# Patient Record
Sex: FEMALE | Race: WHITE | NOT HISPANIC OR LATINO | Employment: OTHER | URBAN - METROPOLITAN AREA
[De-identification: names, ages, dates, MRNs, and addresses within clinical notes are randomized per-mention and may not be internally consistent; named-entity substitution may affect disease eponyms.]

---

## 2017-02-10 ENCOUNTER — APPOINTMENT (OUTPATIENT)
Dept: LAB | Facility: CLINIC | Age: 72
End: 2017-02-10
Payer: COMMERCIAL

## 2017-02-10 ENCOUNTER — TRANSCRIBE ORDERS (OUTPATIENT)
Dept: LAB | Facility: CLINIC | Age: 72
End: 2017-02-10

## 2017-02-10 DIAGNOSIS — E13.9 DIABETES MELLITUS OF OTHER TYPE WITHOUT COMPLICATION: Primary | ICD-10-CM

## 2017-02-10 DIAGNOSIS — E55.9 UNSPECIFIED VITAMIN D DEFICIENCY: ICD-10-CM

## 2017-02-10 DIAGNOSIS — M81.0 OSTEOPOROSIS, UNSPECIFIED: ICD-10-CM

## 2017-02-10 LAB
25(OH)D3 SERPL-MCNC: 33 NG/ML (ref 30–100)
ALBUMIN SERPL BCP-MCNC: 3.4 G/DL (ref 3.5–5)
ALP SERPL-CCNC: 53 U/L (ref 46–116)
ALT SERPL W P-5'-P-CCNC: 18 U/L (ref 12–78)
ANION GAP SERPL CALCULATED.3IONS-SCNC: 8 MMOL/L (ref 4–13)
AST SERPL W P-5'-P-CCNC: 13 U/L (ref 5–45)
BILIRUB SERPL-MCNC: 0.65 MG/DL (ref 0.2–1)
BUN SERPL-MCNC: 17 MG/DL (ref 5–25)
CALCIUM SERPL-MCNC: 8.9 MG/DL (ref 8.3–10.1)
CHLORIDE SERPL-SCNC: 106 MMOL/L (ref 100–108)
CHOLEST SERPL-MCNC: 114 MG/DL (ref 50–200)
CO2 SERPL-SCNC: 27 MMOL/L (ref 21–32)
CREAT SERPL-MCNC: 1.12 MG/DL (ref 0.6–1.3)
EST. AVERAGE GLUCOSE BLD GHB EST-MCNC: 131 MG/DL
GFR SERPL CREATININE-BSD FRML MDRD: 47.6 ML/MIN/1.73SQ M
GLUCOSE SERPL-MCNC: 124 MG/DL (ref 65–140)
HBA1C MFR BLD: 6.2 % (ref 4.2–6.3)
HDLC SERPL-MCNC: 58 MG/DL (ref 40–60)
LDLC SERPL CALC-MCNC: 31 MG/DL (ref 0–100)
POTASSIUM SERPL-SCNC: 4 MMOL/L (ref 3.5–5.3)
PROT SERPL-MCNC: 7 G/DL (ref 6.4–8.2)
SODIUM SERPL-SCNC: 141 MMOL/L (ref 136–145)
TRIGL SERPL-MCNC: 127 MG/DL
TSH SERPL DL<=0.05 MIU/L-ACNC: 0.67 UIU/ML (ref 0.36–3.74)

## 2017-02-10 PROCEDURE — 84443 ASSAY THYROID STIM HORMONE: CPT

## 2017-02-10 PROCEDURE — 82306 VITAMIN D 25 HYDROXY: CPT

## 2017-02-10 PROCEDURE — 36415 COLL VENOUS BLD VENIPUNCTURE: CPT

## 2017-02-10 PROCEDURE — 83036 HEMOGLOBIN GLYCOSYLATED A1C: CPT

## 2017-02-10 PROCEDURE — 80061 LIPID PANEL: CPT

## 2017-02-10 PROCEDURE — 80053 COMPREHEN METABOLIC PANEL: CPT

## 2017-06-06 ENCOUNTER — APPOINTMENT (OUTPATIENT)
Dept: LAB | Facility: CLINIC | Age: 72
End: 2017-06-06
Payer: COMMERCIAL

## 2017-06-06 ENCOUNTER — TRANSCRIBE ORDERS (OUTPATIENT)
Dept: LAB | Facility: CLINIC | Age: 72
End: 2017-06-06

## 2017-06-06 DIAGNOSIS — I10 ESSENTIAL HYPERTENSION, MALIGNANT: ICD-10-CM

## 2017-06-06 DIAGNOSIS — E55.9 UNSPECIFIED VITAMIN D DEFICIENCY: ICD-10-CM

## 2017-06-06 DIAGNOSIS — E78.5 OTHER AND UNSPECIFIED HYPERLIPIDEMIA: ICD-10-CM

## 2017-06-06 DIAGNOSIS — N18.30 CHRONIC KIDNEY DISEASE, STAGE III (MODERATE) (HCC): Primary | ICD-10-CM

## 2017-06-06 LAB
ALBUMIN SERPL BCP-MCNC: 3.4 G/DL (ref 3.5–5)
ALP SERPL-CCNC: 62 U/L (ref 46–116)
ALT SERPL W P-5'-P-CCNC: 16 U/L (ref 12–78)
ANION GAP SERPL CALCULATED.3IONS-SCNC: 8 MMOL/L (ref 4–13)
AST SERPL W P-5'-P-CCNC: 15 U/L (ref 5–45)
BASOPHILS # BLD AUTO: 0.04 THOUSANDS/ΜL (ref 0–0.1)
BASOPHILS NFR BLD AUTO: 0 % (ref 0–1)
BILIRUB SERPL-MCNC: 0.57 MG/DL (ref 0.2–1)
BUN SERPL-MCNC: 13 MG/DL (ref 5–25)
CALCIUM SERPL-MCNC: 9.2 MG/DL (ref 8.3–10.1)
CHLORIDE SERPL-SCNC: 106 MMOL/L (ref 100–108)
CHOLEST SERPL-MCNC: 124 MG/DL (ref 50–200)
CO2 SERPL-SCNC: 27 MMOL/L (ref 21–32)
CREAT SERPL-MCNC: 1.16 MG/DL (ref 0.6–1.3)
CREAT UR-MCNC: 275 MG/DL
EOSINOPHIL # BLD AUTO: 0.13 THOUSAND/ΜL (ref 0–0.61)
EOSINOPHIL NFR BLD AUTO: 2 % (ref 0–6)
ERYTHROCYTE [DISTWIDTH] IN BLOOD BY AUTOMATED COUNT: 13.7 % (ref 11.6–15.1)
EST. AVERAGE GLUCOSE BLD GHB EST-MCNC: 131 MG/DL
GFR SERPL CREATININE-BSD FRML MDRD: 45.7 ML/MIN/1.73SQ M
GLUCOSE P FAST SERPL-MCNC: 119 MG/DL (ref 65–99)
HBA1C MFR BLD: 6.2 % (ref 4.2–6.3)
HCT VFR BLD AUTO: 43 % (ref 34.8–46.1)
HDLC SERPL-MCNC: 54 MG/DL (ref 40–60)
HGB BLD-MCNC: 14 G/DL (ref 11.5–15.4)
LDLC SERPL CALC-MCNC: 45 MG/DL (ref 0–100)
LYMPHOCYTES # BLD AUTO: 2.79 THOUSANDS/ΜL (ref 0.6–4.47)
LYMPHOCYTES NFR BLD AUTO: 31 % (ref 14–44)
MCH RBC QN AUTO: 30.2 PG (ref 26.8–34.3)
MCHC RBC AUTO-ENTMCNC: 32.6 G/DL (ref 31.4–37.4)
MCV RBC AUTO: 93 FL (ref 82–98)
MICROALBUMIN UR-MCNC: 46.7 MG/L (ref 0–20)
MICROALBUMIN/CREAT 24H UR: 17 MG/G CREATININE (ref 0–30)
MONOCYTES # BLD AUTO: 0.86 THOUSAND/ΜL (ref 0.17–1.22)
MONOCYTES NFR BLD AUTO: 10 % (ref 4–12)
NEUTROPHILS # BLD AUTO: 5.09 THOUSANDS/ΜL (ref 1.85–7.62)
NEUTS SEG NFR BLD AUTO: 57 % (ref 43–75)
NRBC BLD AUTO-RTO: 0 /100 WBCS
PHOSPHATE SERPL-MCNC: 3 MG/DL (ref 2.3–4.1)
PLATELET # BLD AUTO: 260 THOUSANDS/UL (ref 149–390)
PMV BLD AUTO: 10.8 FL (ref 8.9–12.7)
POTASSIUM SERPL-SCNC: 4.1 MMOL/L (ref 3.5–5.3)
PROT SERPL-MCNC: 7.2 G/DL (ref 6.4–8.2)
PTH-INTACT SERPL-MCNC: 123.1 PG/ML (ref 14–72)
RBC # BLD AUTO: 4.63 MILLION/UL (ref 3.81–5.12)
SODIUM SERPL-SCNC: 141 MMOL/L (ref 136–145)
TRIGL SERPL-MCNC: 127 MG/DL
WBC # BLD AUTO: 8.94 THOUSAND/UL (ref 4.31–10.16)

## 2017-06-06 PROCEDURE — 83036 HEMOGLOBIN GLYCOSYLATED A1C: CPT

## 2017-06-06 PROCEDURE — 36415 COLL VENOUS BLD VENIPUNCTURE: CPT

## 2017-06-06 PROCEDURE — 85025 COMPLETE CBC W/AUTO DIFF WBC: CPT

## 2017-06-06 PROCEDURE — 80061 LIPID PANEL: CPT

## 2017-06-06 PROCEDURE — 84100 ASSAY OF PHOSPHORUS: CPT

## 2017-06-06 PROCEDURE — 80053 COMPREHEN METABOLIC PANEL: CPT

## 2017-06-06 PROCEDURE — 83970 ASSAY OF PARATHORMONE: CPT

## 2017-06-06 PROCEDURE — 82570 ASSAY OF URINE CREATININE: CPT

## 2017-06-06 PROCEDURE — 82043 UR ALBUMIN QUANTITATIVE: CPT

## 2017-06-16 ENCOUNTER — APPOINTMENT (OUTPATIENT)
Dept: LAB | Facility: CLINIC | Age: 72
End: 2017-06-16
Payer: COMMERCIAL

## 2017-06-16 ENCOUNTER — TRANSCRIBE ORDERS (OUTPATIENT)
Dept: LAB | Facility: CLINIC | Age: 72
End: 2017-06-16

## 2017-06-16 DIAGNOSIS — M81.0 SENILE OSTEOPOROSIS: Primary | ICD-10-CM

## 2017-06-16 LAB
25(OH)D3 SERPL-MCNC: 34 NG/ML (ref 30–100)
ANION GAP SERPL CALCULATED.3IONS-SCNC: 10 MMOL/L (ref 4–13)
BUN SERPL-MCNC: 14 MG/DL (ref 5–25)
CA-I BLD-SCNC: 1.31 MMOL/L (ref 1.12–1.32)
CALCIUM SERPL-MCNC: 9.8 MG/DL (ref 8.3–10.1)
CHLORIDE SERPL-SCNC: 108 MMOL/L (ref 100–108)
CO2 SERPL-SCNC: 26 MMOL/L (ref 21–32)
CREAT SERPL-MCNC: 1.2 MG/DL (ref 0.6–1.3)
GFR SERPL CREATININE-BSD FRML MDRD: 43.9 ML/MIN/1.73SQ M
GLUCOSE SERPL-MCNC: 104 MG/DL (ref 65–140)
POTASSIUM SERPL-SCNC: 4.5 MMOL/L (ref 3.5–5.3)
SODIUM SERPL-SCNC: 144 MMOL/L (ref 136–145)

## 2017-06-16 PROCEDURE — 36415 COLL VENOUS BLD VENIPUNCTURE: CPT

## 2017-06-16 PROCEDURE — 82330 ASSAY OF CALCIUM: CPT

## 2017-06-16 PROCEDURE — 80048 BASIC METABOLIC PNL TOTAL CA: CPT

## 2017-06-16 PROCEDURE — 82306 VITAMIN D 25 HYDROXY: CPT

## 2018-01-19 ENCOUNTER — APPOINTMENT (OUTPATIENT)
Dept: LAB | Facility: CLINIC | Age: 73
End: 2018-01-19
Payer: COMMERCIAL

## 2018-01-19 ENCOUNTER — TRANSCRIBE ORDERS (OUTPATIENT)
Dept: LAB | Facility: CLINIC | Age: 73
End: 2018-01-19

## 2018-01-19 DIAGNOSIS — M81.0 SENILE OSTEOPOROSIS: ICD-10-CM

## 2018-01-19 DIAGNOSIS — E78.5 HYPERLIPIDEMIA, UNSPECIFIED HYPERLIPIDEMIA TYPE: ICD-10-CM

## 2018-01-19 DIAGNOSIS — N18.30 CHRONIC KIDNEY DISEASE, STAGE III (MODERATE) (HCC): ICD-10-CM

## 2018-01-19 DIAGNOSIS — I10 ESSENTIAL HYPERTENSION, MALIGNANT: ICD-10-CM

## 2018-01-19 DIAGNOSIS — E11.8 TYPE 2 DIABETES MELLITUS WITH COMPLICATION, UNSPECIFIED LONG TERM INSULIN USE STATUS: ICD-10-CM

## 2018-01-19 DIAGNOSIS — E55.9 AVITAMINOSIS D: ICD-10-CM

## 2018-01-19 DIAGNOSIS — E11.8 TYPE 2 DIABETES MELLITUS WITH COMPLICATION, UNSPECIFIED LONG TERM INSULIN USE STATUS: Primary | ICD-10-CM

## 2018-01-19 LAB
ALBUMIN SERPL BCP-MCNC: 3.4 G/DL (ref 3.5–5)
ALP SERPL-CCNC: 66 U/L (ref 46–116)
ALT SERPL W P-5'-P-CCNC: 20 U/L (ref 12–78)
ANION GAP SERPL CALCULATED.3IONS-SCNC: 6 MMOL/L (ref 4–13)
AST SERPL W P-5'-P-CCNC: 15 U/L (ref 5–45)
BILIRUB SERPL-MCNC: 0.75 MG/DL (ref 0.2–1)
BUN SERPL-MCNC: 22 MG/DL (ref 5–25)
CALCIUM SERPL-MCNC: 9.3 MG/DL (ref 8.3–10.1)
CHLORIDE SERPL-SCNC: 104 MMOL/L (ref 100–108)
CHOLEST SERPL-MCNC: 123 MG/DL (ref 50–200)
CO2 SERPL-SCNC: 26 MMOL/L (ref 21–32)
CREAT SERPL-MCNC: 1.56 MG/DL (ref 0.6–1.3)
CREAT UR-MCNC: 301 MG/DL
ERYTHROCYTE [DISTWIDTH] IN BLOOD BY AUTOMATED COUNT: 13.9 % (ref 11.6–15.1)
EST. AVERAGE GLUCOSE BLD GHB EST-MCNC: 169 MG/DL
GFR SERPL CREATININE-BSD FRML MDRD: 32 ML/MIN/1.73SQ M
GLUCOSE P FAST SERPL-MCNC: 182 MG/DL (ref 65–99)
HBA1C MFR BLD: 7.5 % (ref 4.2–6.3)
HCT VFR BLD AUTO: 44.4 % (ref 34.8–46.1)
HDLC SERPL-MCNC: 54 MG/DL (ref 40–60)
HGB BLD-MCNC: 14.6 G/DL (ref 11.5–15.4)
LDLC SERPL CALC-MCNC: 42 MG/DL (ref 0–100)
MCH RBC QN AUTO: 30.1 PG (ref 26.8–34.3)
MCHC RBC AUTO-ENTMCNC: 32.9 G/DL (ref 31.4–37.4)
MCV RBC AUTO: 92 FL (ref 82–98)
MICROALBUMIN UR-MCNC: 41.3 MG/L (ref 0–20)
MICROALBUMIN/CREAT 24H UR: 14 MG/G CREATININE (ref 0–30)
PHOSPHATE SERPL-MCNC: 3.6 MG/DL (ref 2.3–4.1)
PLATELET # BLD AUTO: 229 THOUSANDS/UL (ref 149–390)
PMV BLD AUTO: 9.9 FL (ref 8.9–12.7)
POTASSIUM SERPL-SCNC: 3.7 MMOL/L (ref 3.5–5.3)
PROT SERPL-MCNC: 7.2 G/DL (ref 6.4–8.2)
PTH-INTACT SERPL-MCNC: 106.8 PG/ML (ref 14–72)
RBC # BLD AUTO: 4.85 MILLION/UL (ref 3.81–5.12)
SODIUM SERPL-SCNC: 136 MMOL/L (ref 136–145)
TRIGL SERPL-MCNC: 135 MG/DL
WBC # BLD AUTO: 8.42 THOUSAND/UL (ref 4.31–10.16)

## 2018-01-19 PROCEDURE — 36415 COLL VENOUS BLD VENIPUNCTURE: CPT

## 2018-01-19 PROCEDURE — 83036 HEMOGLOBIN GLYCOSYLATED A1C: CPT

## 2018-01-19 PROCEDURE — 84100 ASSAY OF PHOSPHORUS: CPT

## 2018-01-19 PROCEDURE — 82043 UR ALBUMIN QUANTITATIVE: CPT

## 2018-01-19 PROCEDURE — 85027 COMPLETE CBC AUTOMATED: CPT

## 2018-01-19 PROCEDURE — 82570 ASSAY OF URINE CREATININE: CPT

## 2018-01-19 PROCEDURE — 80053 COMPREHEN METABOLIC PANEL: CPT

## 2018-01-19 PROCEDURE — 83970 ASSAY OF PARATHORMONE: CPT

## 2018-01-19 PROCEDURE — 80061 LIPID PANEL: CPT

## 2018-02-01 ENCOUNTER — APPOINTMENT (OUTPATIENT)
Dept: LAB | Facility: CLINIC | Age: 73
End: 2018-02-01
Payer: COMMERCIAL

## 2018-02-01 ENCOUNTER — TRANSCRIBE ORDERS (OUTPATIENT)
Dept: LAB | Facility: CLINIC | Age: 73
End: 2018-02-01

## 2018-02-01 DIAGNOSIS — E04.2 NONTOXIC MULTINODULAR GOITER: ICD-10-CM

## 2018-02-01 DIAGNOSIS — E11.9 TYPE 2 DIABETES MELLITUS WITHOUT COMPLICATION, UNSPECIFIED LONG TERM INSULIN USE STATUS: ICD-10-CM

## 2018-02-01 DIAGNOSIS — N18.30 CHRONIC KIDNEY DISEASE, STAGE III (MODERATE) (HCC): ICD-10-CM

## 2018-02-01 DIAGNOSIS — E04.2 NONTOXIC MULTINODULAR GOITER: Primary | ICD-10-CM

## 2018-02-01 LAB — TSH SERPL DL<=0.05 MIU/L-ACNC: 0.54 UIU/ML (ref 0.36–3.74)

## 2018-02-01 PROCEDURE — 84443 ASSAY THYROID STIM HORMONE: CPT

## 2018-02-01 PROCEDURE — 36415 COLL VENOUS BLD VENIPUNCTURE: CPT

## 2018-05-21 ENCOUNTER — TRANSCRIBE ORDERS (OUTPATIENT)
Dept: LAB | Facility: CLINIC | Age: 73
End: 2018-05-21

## 2018-05-21 ENCOUNTER — APPOINTMENT (OUTPATIENT)
Dept: LAB | Facility: CLINIC | Age: 73
End: 2018-05-21
Payer: COMMERCIAL

## 2018-05-21 DIAGNOSIS — N18.6 TYPE 2 DIABETES MELLITUS WITH ESRD (END-STAGE RENAL DISEASE) (HCC): Primary | ICD-10-CM

## 2018-05-21 DIAGNOSIS — N18.9 CHRONIC KIDNEY DISEASE, UNSPECIFIED CKD STAGE: ICD-10-CM

## 2018-05-21 DIAGNOSIS — I12.9 PARENCHYMAL RENAL HYPERTENSION, STAGE 1 THROUGH STAGE 4 OR UNSPECIFIED CHRONIC KIDNEY DISEASE: ICD-10-CM

## 2018-05-21 DIAGNOSIS — N18.6 TYPE 2 DIABETES MELLITUS WITH ESRD (END-STAGE RENAL DISEASE) (HCC): ICD-10-CM

## 2018-05-21 DIAGNOSIS — E11.22 TYPE 2 DIABETES MELLITUS WITH ESRD (END-STAGE RENAL DISEASE) (HCC): ICD-10-CM

## 2018-05-21 DIAGNOSIS — E11.22 TYPE 2 DIABETES MELLITUS WITH ESRD (END-STAGE RENAL DISEASE) (HCC): Primary | ICD-10-CM

## 2018-05-21 LAB
ANION GAP SERPL CALCULATED.3IONS-SCNC: 7 MMOL/L (ref 4–13)
BUN SERPL-MCNC: 24 MG/DL (ref 5–25)
CALCIUM SERPL-MCNC: 8.8 MG/DL (ref 8.3–10.1)
CHLORIDE SERPL-SCNC: 107 MMOL/L (ref 100–108)
CO2 SERPL-SCNC: 28 MMOL/L (ref 21–32)
CREAT SERPL-MCNC: 1.52 MG/DL (ref 0.6–1.3)
EST. AVERAGE GLUCOSE BLD GHB EST-MCNC: 200 MG/DL
GFR SERPL CREATININE-BSD FRML MDRD: 33 ML/MIN/1.73SQ M
GLUCOSE P FAST SERPL-MCNC: 229 MG/DL (ref 65–99)
HBA1C MFR BLD: 8.6 % (ref 4.2–6.3)
POTASSIUM SERPL-SCNC: 4.3 MMOL/L (ref 3.5–5.3)
SODIUM SERPL-SCNC: 142 MMOL/L (ref 136–145)

## 2018-05-21 PROCEDURE — 36415 COLL VENOUS BLD VENIPUNCTURE: CPT

## 2018-05-21 PROCEDURE — 83036 HEMOGLOBIN GLYCOSYLATED A1C: CPT

## 2018-05-21 PROCEDURE — 80048 BASIC METABOLIC PNL TOTAL CA: CPT

## 2018-09-24 ENCOUNTER — APPOINTMENT (OUTPATIENT)
Dept: LAB | Facility: CLINIC | Age: 73
End: 2018-09-24
Payer: COMMERCIAL

## 2018-09-24 ENCOUNTER — TRANSCRIBE ORDERS (OUTPATIENT)
Dept: LAB | Facility: CLINIC | Age: 73
End: 2018-09-24

## 2018-09-24 DIAGNOSIS — N18.6 TYPE 2 DIABETES MELLITUS WITH ESRD (END-STAGE RENAL DISEASE) (HCC): ICD-10-CM

## 2018-09-24 DIAGNOSIS — I12.9 PARENCHYMAL RENAL HYPERTENSION, STAGE 1 THROUGH STAGE 4 OR UNSPECIFIED CHRONIC KIDNEY DISEASE: ICD-10-CM

## 2018-09-24 DIAGNOSIS — E11.49 DIABETIC NEUROPATHY WITH NEUROLOGIC COMPLICATION (HCC): Primary | ICD-10-CM

## 2018-09-24 DIAGNOSIS — E11.22 TYPE 2 DIABETES MELLITUS WITH ESRD (END-STAGE RENAL DISEASE) (HCC): ICD-10-CM

## 2018-09-24 DIAGNOSIS — E11.40 DIABETIC NEUROPATHY WITH NEUROLOGIC COMPLICATION (HCC): Primary | ICD-10-CM

## 2018-09-24 DIAGNOSIS — N18.30 CHRONIC KIDNEY DISEASE, STAGE III (MODERATE) (HCC): ICD-10-CM

## 2018-09-24 DIAGNOSIS — E11.49 DIABETIC NEUROPATHY WITH NEUROLOGIC COMPLICATION (HCC): ICD-10-CM

## 2018-09-24 DIAGNOSIS — E11.40 DIABETIC NEUROPATHY WITH NEUROLOGIC COMPLICATION (HCC): ICD-10-CM

## 2018-09-24 LAB
25(OH)D3 SERPL-MCNC: 27.4 NG/ML (ref 30–100)
ALBUMIN SERPL BCP-MCNC: 3.1 G/DL (ref 3.5–5)
ALP SERPL-CCNC: 81 U/L (ref 46–116)
ALT SERPL W P-5'-P-CCNC: 16 U/L (ref 12–78)
ANION GAP SERPL CALCULATED.3IONS-SCNC: 6 MMOL/L (ref 4–13)
AST SERPL W P-5'-P-CCNC: 17 U/L (ref 5–45)
BILIRUB SERPL-MCNC: 0.6 MG/DL (ref 0.2–1)
BUN SERPL-MCNC: 20 MG/DL (ref 5–25)
CALCIUM SERPL-MCNC: 9 MG/DL (ref 8.3–10.1)
CHLORIDE SERPL-SCNC: 107 MMOL/L (ref 100–108)
CHOLEST SERPL-MCNC: 132 MG/DL (ref 50–200)
CO2 SERPL-SCNC: 25 MMOL/L (ref 21–32)
CREAT SERPL-MCNC: 1.37 MG/DL (ref 0.6–1.3)
ERYTHROCYTE [DISTWIDTH] IN BLOOD BY AUTOMATED COUNT: 13.7 % (ref 11.6–15.1)
EST. AVERAGE GLUCOSE BLD GHB EST-MCNC: 171 MG/DL
GFR SERPL CREATININE-BSD FRML MDRD: 38 ML/MIN/1.73SQ M
GLUCOSE P FAST SERPL-MCNC: 188 MG/DL (ref 65–99)
HBA1C MFR BLD: 7.6 % (ref 4.2–6.3)
HCT VFR BLD AUTO: 47.4 % (ref 34.8–46.1)
HDLC SERPL-MCNC: 50 MG/DL (ref 40–60)
HGB BLD-MCNC: 15.1 G/DL (ref 11.5–15.4)
LDLC SERPL CALC-MCNC: 52 MG/DL (ref 0–100)
MCH RBC QN AUTO: 30.3 PG (ref 26.8–34.3)
MCHC RBC AUTO-ENTMCNC: 31.9 G/DL (ref 31.4–37.4)
MCV RBC AUTO: 95 FL (ref 82–98)
NONHDLC SERPL-MCNC: 82 MG/DL
PLATELET # BLD AUTO: 261 THOUSANDS/UL (ref 149–390)
PMV BLD AUTO: 9.9 FL (ref 8.9–12.7)
POTASSIUM SERPL-SCNC: 4 MMOL/L (ref 3.5–5.3)
PROT SERPL-MCNC: 7.4 G/DL (ref 6.4–8.2)
RBC # BLD AUTO: 4.98 MILLION/UL (ref 3.81–5.12)
SODIUM SERPL-SCNC: 138 MMOL/L (ref 136–145)
TRIGL SERPL-MCNC: 148 MG/DL
WBC # BLD AUTO: 7.88 THOUSAND/UL (ref 4.31–10.16)

## 2018-09-24 PROCEDURE — 82306 VITAMIN D 25 HYDROXY: CPT

## 2018-09-24 PROCEDURE — 80053 COMPREHEN METABOLIC PANEL: CPT

## 2018-09-24 PROCEDURE — 83036 HEMOGLOBIN GLYCOSYLATED A1C: CPT

## 2018-09-24 PROCEDURE — 36415 COLL VENOUS BLD VENIPUNCTURE: CPT

## 2018-09-24 PROCEDURE — 80061 LIPID PANEL: CPT

## 2018-09-24 PROCEDURE — 85027 COMPLETE CBC AUTOMATED: CPT

## 2018-10-16 ENCOUNTER — APPOINTMENT (OUTPATIENT)
Dept: LAB | Facility: CLINIC | Age: 73
End: 2018-10-16
Payer: COMMERCIAL

## 2018-10-16 ENCOUNTER — TRANSCRIBE ORDERS (OUTPATIENT)
Dept: LAB | Facility: CLINIC | Age: 73
End: 2018-10-16

## 2018-10-16 DIAGNOSIS — E55.9 AVITAMINOSIS D: ICD-10-CM

## 2018-10-16 DIAGNOSIS — N18.6 TYPE 2 DIABETES MELLITUS WITH ESRD (END-STAGE RENAL DISEASE) (HCC): ICD-10-CM

## 2018-10-16 DIAGNOSIS — E78.5 HYPERLIPIDEMIA, UNSPECIFIED HYPERLIPIDEMIA TYPE: ICD-10-CM

## 2018-10-16 DIAGNOSIS — M81.0 SENILE OSTEOPOROSIS: ICD-10-CM

## 2018-10-16 DIAGNOSIS — E04.2 NONTOXIC MULTINODULAR GOITER: ICD-10-CM

## 2018-10-16 DIAGNOSIS — N18.30 CHRONIC KIDNEY DISEASE, STAGE III (MODERATE) (HCC): ICD-10-CM

## 2018-10-16 DIAGNOSIS — E11.22 TYPE 2 DIABETES MELLITUS WITH ESRD (END-STAGE RENAL DISEASE) (HCC): ICD-10-CM

## 2018-10-16 DIAGNOSIS — E13.42 DIABETIC POLYNEUROPATHY ASSOCIATED WITH OTHER SPECIFIED DIABETES MELLITUS (HCC): ICD-10-CM

## 2018-10-16 DIAGNOSIS — E11.9 TYPE 2 DIABETES MELLITUS WITHOUT COMPLICATION, UNSPECIFIED WHETHER LONG TERM INSULIN USE (HCC): ICD-10-CM

## 2018-10-16 DIAGNOSIS — I10 ESSENTIAL HYPERTENSION, MALIGNANT: ICD-10-CM

## 2018-10-16 DIAGNOSIS — I10 ESSENTIAL HYPERTENSION, MALIGNANT: Primary | ICD-10-CM

## 2018-10-16 LAB
25(OH)D3 SERPL-MCNC: 30.9 NG/ML (ref 30–100)
ALBUMIN SERPL BCP-MCNC: 3.4 G/DL (ref 3.5–5)
ALP SERPL-CCNC: 112 U/L (ref 46–116)
ALT SERPL W P-5'-P-CCNC: 21 U/L (ref 12–78)
ANION GAP SERPL CALCULATED.3IONS-SCNC: 5 MMOL/L (ref 4–13)
AST SERPL W P-5'-P-CCNC: 15 U/L (ref 5–45)
BACTERIA UR QL AUTO: ABNORMAL /HPF
BILIRUB SERPL-MCNC: 0.47 MG/DL (ref 0.2–1)
BILIRUB UR QL STRIP: NEGATIVE
BUN SERPL-MCNC: 27 MG/DL (ref 5–25)
CALCIUM SERPL-MCNC: 10.1 MG/DL (ref 8.3–10.1)
CHLORIDE SERPL-SCNC: 101 MMOL/L (ref 100–108)
CLARITY UR: CLEAR
CO2 SERPL-SCNC: 28 MMOL/L (ref 21–32)
COLOR UR: YELLOW
CREAT SERPL-MCNC: 1.79 MG/DL (ref 0.6–1.3)
CREAT UR-MCNC: 125 MG/DL
EST. AVERAGE GLUCOSE BLD GHB EST-MCNC: 171 MG/DL
GFR SERPL CREATININE-BSD FRML MDRD: 27 ML/MIN/1.73SQ M
GLUCOSE P FAST SERPL-MCNC: 344 MG/DL (ref 65–99)
GLUCOSE UR STRIP-MCNC: ABNORMAL MG/DL
HBA1C MFR BLD: 7.6 % (ref 4.2–6.3)
HGB UR QL STRIP.AUTO: NEGATIVE
HYALINE CASTS #/AREA URNS LPF: ABNORMAL /LPF
KETONES UR STRIP-MCNC: NEGATIVE MG/DL
LDLC SERPL DIRECT ASSAY-MCNC: 56 MG/DL (ref 0–100)
LEUKOCYTE ESTERASE UR QL STRIP: ABNORMAL
MAGNESIUM SERPL-MCNC: 2.2 MG/DL (ref 1.6–2.6)
NITRITE UR QL STRIP: NEGATIVE
NON-SQ EPI CELLS URNS QL MICRO: ABNORMAL /HPF
PH UR STRIP.AUTO: 6 [PH] (ref 4.5–8)
PHOSPHATE SERPL-MCNC: 4 MG/DL (ref 2.3–4.1)
POTASSIUM SERPL-SCNC: 4.6 MMOL/L (ref 3.5–5.3)
PROT SERPL-MCNC: 8 G/DL (ref 6.4–8.2)
PROT UR STRIP-MCNC: ABNORMAL MG/DL
PROT UR-MCNC: 35 MG/DL
PROT/CREAT UR: 0.28 MG/G{CREAT} (ref 0–0.1)
RBC #/AREA URNS AUTO: ABNORMAL /HPF
SODIUM SERPL-SCNC: 134 MMOL/L (ref 136–145)
SP GR UR STRIP.AUTO: 1.02 (ref 1–1.03)
TSH SERPL DL<=0.05 MIU/L-ACNC: 0.99 UIU/ML (ref 0.36–3.74)
URATE SERPL-MCNC: 6.1 MG/DL (ref 2–6.8)
UROBILINOGEN UR QL STRIP.AUTO: 0.2 E.U./DL
WBC #/AREA URNS AUTO: ABNORMAL /HPF

## 2018-10-16 PROCEDURE — 82306 VITAMIN D 25 HYDROXY: CPT

## 2018-10-16 PROCEDURE — 80053 COMPREHEN METABOLIC PANEL: CPT

## 2018-10-16 PROCEDURE — 36415 COLL VENOUS BLD VENIPUNCTURE: CPT

## 2018-10-16 PROCEDURE — 84443 ASSAY THYROID STIM HORMONE: CPT

## 2018-10-16 PROCEDURE — 84550 ASSAY OF BLOOD/URIC ACID: CPT

## 2018-10-16 PROCEDURE — 83721 ASSAY OF BLOOD LIPOPROTEIN: CPT

## 2018-10-16 PROCEDURE — 83036 HEMOGLOBIN GLYCOSYLATED A1C: CPT

## 2018-10-16 PROCEDURE — 84156 ASSAY OF PROTEIN URINE: CPT

## 2018-10-16 PROCEDURE — 82570 ASSAY OF URINE CREATININE: CPT

## 2018-10-16 PROCEDURE — 84100 ASSAY OF PHOSPHORUS: CPT

## 2018-10-16 PROCEDURE — 81001 URINALYSIS AUTO W/SCOPE: CPT

## 2018-10-16 PROCEDURE — 83735 ASSAY OF MAGNESIUM: CPT

## 2019-02-05 ENCOUNTER — APPOINTMENT (OUTPATIENT)
Dept: LAB | Facility: CLINIC | Age: 74
End: 2019-02-05
Payer: COMMERCIAL

## 2019-02-05 ENCOUNTER — TRANSCRIBE ORDERS (OUTPATIENT)
Dept: LAB | Facility: CLINIC | Age: 74
End: 2019-02-05

## 2019-02-05 DIAGNOSIS — E55.9 AVITAMINOSIS D: ICD-10-CM

## 2019-02-05 DIAGNOSIS — E11.22 TYPE 2 DIABETES MELLITUS WITH ESRD (END-STAGE RENAL DISEASE) (HCC): ICD-10-CM

## 2019-02-05 DIAGNOSIS — I10 ESSENTIAL HYPERTENSION, MALIGNANT: ICD-10-CM

## 2019-02-05 DIAGNOSIS — N18.30 CHRONIC KIDNEY DISEASE, STAGE III (MODERATE) (HCC): ICD-10-CM

## 2019-02-05 DIAGNOSIS — N18.6 TYPE 2 DIABETES MELLITUS WITH ESRD (END-STAGE RENAL DISEASE) (HCC): ICD-10-CM

## 2019-02-05 DIAGNOSIS — N18.30 CHRONIC KIDNEY DISEASE, STAGE III (MODERATE) (HCC): Primary | ICD-10-CM

## 2019-02-05 LAB
25(OH)D3 SERPL-MCNC: 34.6 NG/ML (ref 30–100)
ALBUMIN SERPL BCP-MCNC: 3.4 G/DL (ref 3.5–5)
ANION GAP SERPL CALCULATED.3IONS-SCNC: 8 MMOL/L (ref 4–13)
BUN SERPL-MCNC: 21 MG/DL (ref 5–25)
CA-I BLD-SCNC: 1.24 MMOL/L (ref 1.12–1.32)
CALCIUM SERPL-MCNC: 9.2 MG/DL (ref 8.3–10.1)
CHLORIDE SERPL-SCNC: 105 MMOL/L (ref 100–108)
CO2 SERPL-SCNC: 26 MMOL/L (ref 21–32)
CREAT SERPL-MCNC: 1.67 MG/DL (ref 0.6–1.3)
ERYTHROCYTE [DISTWIDTH] IN BLOOD BY AUTOMATED COUNT: 14.6 % (ref 11.6–15.1)
EST. AVERAGE GLUCOSE BLD GHB EST-MCNC: 166 MG/DL
GFR SERPL CREATININE-BSD FRML MDRD: 30 ML/MIN/1.73SQ M
GLUCOSE P FAST SERPL-MCNC: 180 MG/DL (ref 65–99)
HBA1C MFR BLD: 7.4 % (ref 4.2–6.3)
HCT VFR BLD AUTO: 48.7 % (ref 34.8–46.1)
HGB BLD-MCNC: 15.5 G/DL (ref 11.5–15.4)
MCH RBC QN AUTO: 30.2 PG (ref 26.8–34.3)
MCHC RBC AUTO-ENTMCNC: 31.8 G/DL (ref 31.4–37.4)
MCV RBC AUTO: 95 FL (ref 82–98)
PHOSPHATE SERPL-MCNC: 4.1 MG/DL (ref 2.3–4.1)
PLATELET # BLD AUTO: 250 THOUSANDS/UL (ref 149–390)
PMV BLD AUTO: 10.3 FL (ref 8.9–12.7)
POTASSIUM SERPL-SCNC: 4.1 MMOL/L (ref 3.5–5.3)
PTH-INTACT SERPL-MCNC: 136.8 PG/ML (ref 18.4–80.1)
RBC # BLD AUTO: 5.14 MILLION/UL (ref 3.81–5.12)
SODIUM SERPL-SCNC: 139 MMOL/L (ref 136–145)
URATE SERPL-MCNC: 6.6 MG/DL (ref 2–6.8)
WBC # BLD AUTO: 8.46 THOUSAND/UL (ref 4.31–10.16)

## 2019-02-05 PROCEDURE — 83036 HEMOGLOBIN GLYCOSYLATED A1C: CPT

## 2019-02-05 PROCEDURE — 82306 VITAMIN D 25 HYDROXY: CPT

## 2019-02-05 PROCEDURE — 36415 COLL VENOUS BLD VENIPUNCTURE: CPT

## 2019-02-05 PROCEDURE — 83970 ASSAY OF PARATHORMONE: CPT

## 2019-02-05 PROCEDURE — 85027 COMPLETE CBC AUTOMATED: CPT

## 2019-02-05 PROCEDURE — 80069 RENAL FUNCTION PANEL: CPT

## 2019-02-05 PROCEDURE — 82330 ASSAY OF CALCIUM: CPT

## 2019-02-05 PROCEDURE — 84550 ASSAY OF BLOOD/URIC ACID: CPT

## 2019-02-06 ENCOUNTER — APPOINTMENT (OUTPATIENT)
Dept: LAB | Facility: CLINIC | Age: 74
End: 2019-02-06
Payer: COMMERCIAL

## 2019-02-06 ENCOUNTER — TRANSCRIBE ORDERS (OUTPATIENT)
Dept: LAB | Facility: CLINIC | Age: 74
End: 2019-02-06

## 2019-02-06 DIAGNOSIS — N18.30 CHRONIC KIDNEY DISEASE, STAGE III (MODERATE) (HCC): Primary | ICD-10-CM

## 2019-02-06 DIAGNOSIS — E55.9 AVITAMINOSIS D: ICD-10-CM

## 2019-02-06 DIAGNOSIS — I10 ESSENTIAL HYPERTENSION, MALIGNANT: ICD-10-CM

## 2019-02-06 DIAGNOSIS — E11.22 TYPE 2 DIABETES MELLITUS WITH ESRD (END-STAGE RENAL DISEASE) (HCC): ICD-10-CM

## 2019-02-06 DIAGNOSIS — N18.6 TYPE 2 DIABETES MELLITUS WITH ESRD (END-STAGE RENAL DISEASE) (HCC): ICD-10-CM

## 2019-02-06 LAB
BACTERIA UR QL AUTO: ABNORMAL /HPF
BILIRUB UR QL STRIP: ABNORMAL
CLARITY UR: CLEAR
COLOR UR: ABNORMAL
CREAT UR-MCNC: 222 MG/DL
CREAT UR-MCNC: 222 MG/DL
GLUCOSE UR STRIP-MCNC: ABNORMAL MG/DL
HGB UR QL STRIP.AUTO: NEGATIVE
KETONES UR STRIP-MCNC: NEGATIVE MG/DL
LEUKOCYTE ESTERASE UR QL STRIP: ABNORMAL
MICROALBUMIN UR-MCNC: 36.6 MG/L (ref 0–20)
MICROALBUMIN/CREAT 24H UR: 16 MG/G CREATININE (ref 0–30)
NITRITE UR QL STRIP: NEGATIVE
NON-SQ EPI CELLS URNS QL MICRO: ABNORMAL /HPF
PH UR STRIP.AUTO: 5 [PH] (ref 4.5–8)
PROT UR STRIP-MCNC: ABNORMAL MG/DL
PROT UR-MCNC: 40 MG/DL
PROT/CREAT UR: 0.18 MG/G{CREAT} (ref 0–0.1)
RBC #/AREA URNS AUTO: ABNORMAL /HPF
SP GR UR STRIP.AUTO: 1.03 (ref 1–1.03)
UROBILINOGEN UR QL STRIP.AUTO: 0.2 E.U./DL
WBC #/AREA URNS AUTO: ABNORMAL /HPF

## 2019-02-06 PROCEDURE — 82043 UR ALBUMIN QUANTITATIVE: CPT

## 2019-02-06 PROCEDURE — 82570 ASSAY OF URINE CREATININE: CPT

## 2019-02-06 PROCEDURE — 81001 URINALYSIS AUTO W/SCOPE: CPT

## 2019-02-06 PROCEDURE — 84156 ASSAY OF PROTEIN URINE: CPT

## 2019-07-08 ENCOUNTER — APPOINTMENT (OUTPATIENT)
Dept: LAB | Facility: CLINIC | Age: 74
End: 2019-07-08
Payer: COMMERCIAL

## 2019-07-08 ENCOUNTER — TRANSCRIBE ORDERS (OUTPATIENT)
Dept: LAB | Facility: CLINIC | Age: 74
End: 2019-07-08

## 2019-07-08 DIAGNOSIS — E11.49 DIABETIC NEUROPATHY WITH NEUROLOGIC COMPLICATION (HCC): ICD-10-CM

## 2019-07-08 DIAGNOSIS — N18.30 CHRONIC KIDNEY DISEASE, STAGE III (MODERATE) (HCC): Primary | ICD-10-CM

## 2019-07-08 DIAGNOSIS — E78.5 HYPERLIPIDEMIA, UNSPECIFIED HYPERLIPIDEMIA TYPE: ICD-10-CM

## 2019-07-08 DIAGNOSIS — E11.40 DIABETIC NEUROPATHY WITH NEUROLOGIC COMPLICATION (HCC): ICD-10-CM

## 2019-07-08 DIAGNOSIS — I10 ESSENTIAL HYPERTENSION, MALIGNANT: ICD-10-CM

## 2019-07-08 DIAGNOSIS — E11.22 TYPE 2 DIABETES MELLITUS WITH ESRD (END-STAGE RENAL DISEASE) (HCC): ICD-10-CM

## 2019-07-08 DIAGNOSIS — N18.6 TYPE 2 DIABETES MELLITUS WITH ESRD (END-STAGE RENAL DISEASE) (HCC): ICD-10-CM

## 2019-07-08 DIAGNOSIS — E55.9 AVITAMINOSIS D: ICD-10-CM

## 2019-07-08 DIAGNOSIS — N18.30 CHRONIC KIDNEY DISEASE, STAGE III (MODERATE) (HCC): ICD-10-CM

## 2019-07-08 LAB
25(OH)D3 SERPL-MCNC: 28.4 NG/ML (ref 30–100)
ALBUMIN SERPL BCP-MCNC: 3.1 G/DL (ref 3.5–5)
ALP SERPL-CCNC: 99 U/L (ref 46–116)
ALT SERPL W P-5'-P-CCNC: 16 U/L (ref 12–78)
ANION GAP SERPL CALCULATED.3IONS-SCNC: 5 MMOL/L (ref 4–13)
AST SERPL W P-5'-P-CCNC: 12 U/L (ref 5–45)
BACTERIA UR QL AUTO: ABNORMAL /HPF
BILIRUB SERPL-MCNC: 0.63 MG/DL (ref 0.2–1)
BILIRUB UR QL STRIP: ABNORMAL
BUN SERPL-MCNC: 22 MG/DL (ref 5–25)
CALCIUM SERPL-MCNC: 8.8 MG/DL (ref 8.3–10.1)
CHLORIDE SERPL-SCNC: 105 MMOL/L (ref 100–108)
CLARITY UR: ABNORMAL
CO2 SERPL-SCNC: 29 MMOL/L (ref 21–32)
COLOR UR: ABNORMAL
CREAT SERPL-MCNC: 1.81 MG/DL (ref 0.6–1.3)
CREAT UR-MCNC: 382 MG/DL
CREAT UR-MCNC: 382 MG/DL
ERYTHROCYTE [DISTWIDTH] IN BLOOD BY AUTOMATED COUNT: 14.4 % (ref 11.6–15.1)
EST. AVERAGE GLUCOSE BLD GHB EST-MCNC: 177 MG/DL
GFR SERPL CREATININE-BSD FRML MDRD: 27 ML/MIN/1.73SQ M
GLUCOSE SERPL-MCNC: 186 MG/DL (ref 65–140)
GLUCOSE UR STRIP-MCNC: ABNORMAL MG/DL
HBA1C MFR BLD: 7.8 % (ref 4.2–6.3)
HCT VFR BLD AUTO: 49 % (ref 34.8–46.1)
HGB BLD-MCNC: 15.3 G/DL (ref 11.5–15.4)
HGB UR QL STRIP.AUTO: NEGATIVE
HYALINE CASTS #/AREA URNS LPF: ABNORMAL /LPF
KETONES UR STRIP-MCNC: ABNORMAL MG/DL
LEUKOCYTE ESTERASE UR QL STRIP: NEGATIVE
MCH RBC QN AUTO: 29.3 PG (ref 26.8–34.3)
MCHC RBC AUTO-ENTMCNC: 31.2 G/DL (ref 31.4–37.4)
MCV RBC AUTO: 94 FL (ref 82–98)
MICROALBUMIN UR-MCNC: 70.1 MG/L (ref 0–20)
MICROALBUMIN/CREAT 24H UR: 18 MG/G CREATININE (ref 0–30)
NITRITE UR QL STRIP: NEGATIVE
NON-SQ EPI CELLS URNS QL MICRO: ABNORMAL /HPF
PH UR STRIP.AUTO: 5.5 [PH]
PHOSPHATE SERPL-MCNC: 3.8 MG/DL (ref 2.3–4.1)
PLATELET # BLD AUTO: 230 THOUSANDS/UL (ref 149–390)
PMV BLD AUTO: 10.4 FL (ref 8.9–12.7)
POTASSIUM SERPL-SCNC: 4 MMOL/L (ref 3.5–5.3)
PROT SERPL-MCNC: 7 G/DL (ref 6.4–8.2)
PROT UR STRIP-MCNC: ABNORMAL MG/DL
PROT UR-MCNC: 57 MG/DL
PROT/CREAT UR: 0.15 MG/G{CREAT} (ref 0–0.1)
PTH-INTACT SERPL-MCNC: 362.7 PG/ML (ref 18.4–80.1)
RBC # BLD AUTO: 5.22 MILLION/UL (ref 3.81–5.12)
RBC #/AREA URNS AUTO: ABNORMAL /HPF
SODIUM SERPL-SCNC: 139 MMOL/L (ref 136–145)
SP GR UR STRIP.AUTO: 1.03 (ref 1–1.03)
UROBILINOGEN UR QL STRIP.AUTO: 1 E.U./DL
WBC # BLD AUTO: 9.76 THOUSAND/UL (ref 4.31–10.16)
WBC #/AREA URNS AUTO: ABNORMAL /HPF

## 2019-07-08 PROCEDURE — 82570 ASSAY OF URINE CREATININE: CPT

## 2019-07-08 PROCEDURE — 83970 ASSAY OF PARATHORMONE: CPT

## 2019-07-08 PROCEDURE — 82043 UR ALBUMIN QUANTITATIVE: CPT

## 2019-07-08 PROCEDURE — 84100 ASSAY OF PHOSPHORUS: CPT

## 2019-07-08 PROCEDURE — 36415 COLL VENOUS BLD VENIPUNCTURE: CPT

## 2019-07-08 PROCEDURE — 85027 COMPLETE CBC AUTOMATED: CPT

## 2019-07-08 PROCEDURE — 82306 VITAMIN D 25 HYDROXY: CPT

## 2019-07-08 PROCEDURE — 83036 HEMOGLOBIN GLYCOSYLATED A1C: CPT

## 2019-07-08 PROCEDURE — 80053 COMPREHEN METABOLIC PANEL: CPT

## 2019-07-08 PROCEDURE — 84156 ASSAY OF PROTEIN URINE: CPT

## 2019-07-08 PROCEDURE — 81001 URINALYSIS AUTO W/SCOPE: CPT

## 2019-09-18 ENCOUNTER — APPOINTMENT (INPATIENT)
Dept: NON INVASIVE DIAGNOSTICS | Facility: HOSPITAL | Age: 74
DRG: 189 | End: 2019-09-18
Payer: COMMERCIAL

## 2019-09-18 ENCOUNTER — HOSPITAL ENCOUNTER (INPATIENT)
Facility: HOSPITAL | Age: 74
LOS: 4 days | Discharge: HOME HEALTH | DRG: 189 | End: 2019-09-22
Attending: EMERGENCY MEDICINE | Admitting: INTERNAL MEDICINE
Payer: COMMERCIAL

## 2019-09-18 ENCOUNTER — APPOINTMENT (EMERGENCY)
Dept: RADIOLOGY | Facility: HOSPITAL | Age: 74
DRG: 189 | End: 2019-09-18
Payer: COMMERCIAL

## 2019-09-18 ENCOUNTER — APPOINTMENT (INPATIENT)
Dept: NUCLEAR MEDICINE | Facility: HOSPITAL | Age: 74
DRG: 189 | End: 2019-09-18
Payer: COMMERCIAL

## 2019-09-18 DIAGNOSIS — I82.412 DVT OF DEEP FEMORAL VEIN, LEFT (HCC): ICD-10-CM

## 2019-09-18 DIAGNOSIS — Z72.0 TOBACCO ABUSE: Chronic | ICD-10-CM

## 2019-09-18 DIAGNOSIS — R06.03 ACUTE RESPIRATORY DISTRESS: Primary | ICD-10-CM

## 2019-09-18 DIAGNOSIS — J98.01 BRONCHOSPASM: ICD-10-CM

## 2019-09-18 DIAGNOSIS — R09.02 HYPOXEMIA: ICD-10-CM

## 2019-09-18 PROBLEM — J96.01 ACUTE RESPIRATORY FAILURE WITH HYPOXIA (HCC): Status: ACTIVE | Noted: 2019-09-18

## 2019-09-18 PROBLEM — E11.9 TYPE 2 DIABETES MELLITUS WITHOUT COMPLICATION, WITHOUT LONG-TERM CURRENT USE OF INSULIN (HCC): Status: ACTIVE | Noted: 2019-09-18

## 2019-09-18 PROBLEM — N18.30 STAGE 3 CHRONIC KIDNEY DISEASE (HCC): Status: ACTIVE | Noted: 2019-09-18

## 2019-09-18 PROBLEM — I82.409 DVT (DEEP VENOUS THROMBOSIS) (HCC): Status: ACTIVE | Noted: 2019-09-18

## 2019-09-18 LAB
ALBUMIN SERPL BCP-MCNC: 3.7 G/DL (ref 3.5–5.7)
ALP SERPL-CCNC: 71 U/L (ref 55–165)
ALT SERPL W P-5'-P-CCNC: 10 U/L (ref 7–52)
ANION GAP SERPL CALCULATED.3IONS-SCNC: 8 MMOL/L (ref 4–13)
APTT PPP: 27 SECONDS (ref 23–37)
AST SERPL W P-5'-P-CCNC: 18 U/L (ref 13–39)
ATRIAL RATE: 78 BPM
BILIRUB SERPL-MCNC: 1 MG/DL (ref 0.2–1)
BNP SERPL-MCNC: 262 PG/ML (ref 1–100)
BUN SERPL-MCNC: 21 MG/DL (ref 7–25)
CALCIUM SERPL-MCNC: 9.3 MG/DL (ref 8.6–10.5)
CHLORIDE SERPL-SCNC: 101 MMOL/L (ref 98–107)
CO2 SERPL-SCNC: 26 MMOL/L (ref 21–31)
CREAT SERPL-MCNC: 1.48 MG/DL (ref 0.6–1.2)
ERYTHROCYTE [DISTWIDTH] IN BLOOD BY AUTOMATED COUNT: 14.5 % (ref 11.5–14.5)
GFR SERPL CREATININE-BSD FRML MDRD: 34 ML/MIN/1.73SQ M
GLUCOSE SERPL-MCNC: 221 MG/DL (ref 65–140)
GLUCOSE SERPL-MCNC: 230 MG/DL (ref 65–140)
GLUCOSE SERPL-MCNC: 231 MG/DL (ref 65–140)
GLUCOSE SERPL-MCNC: 264 MG/DL (ref 65–99)
HCT VFR BLD AUTO: 45.3 % (ref 42–47)
HGB BLD-MCNC: 14.8 G/DL (ref 12–16)
INR PPP: 1.08 (ref 0.9–1.5)
LYMPHOCYTES # BLD AUTO: 0.34 THOUSAND/UL (ref 0.6–4.47)
LYMPHOCYTES # BLD AUTO: 3 % (ref 20–51)
MAGNESIUM SERPL-MCNC: 1.9 MG/DL (ref 1.9–2.7)
MCH RBC QN AUTO: 29.4 PG (ref 26–34)
MCHC RBC AUTO-ENTMCNC: 32.7 G/DL (ref 31–37)
MCV RBC AUTO: 90 FL (ref 81–99)
MONOCYTES # BLD AUTO: 0.34 THOUSAND/UL (ref 0–1.22)
MONOCYTES NFR BLD AUTO: 3 % (ref 4–12)
NEUTS SEG # BLD: 10.53 THOUSAND/UL (ref 1.81–6.82)
NEUTS SEG NFR BLD AUTO: 94 % (ref 42–75)
P AXIS: 59 DEGREES
PLATELET # BLD AUTO: 203 THOUSANDS/UL (ref 149–390)
PLATELET BLD QL SMEAR: ADEQUATE
PMV BLD AUTO: 7.9 FL (ref 8.6–11.7)
POTASSIUM SERPL-SCNC: 5.3 MMOL/L (ref 3.5–5.5)
PR INTERVAL: 156 MS
PROT SERPL-MCNC: 7.4 G/DL (ref 6.4–8.9)
PROTHROMBIN TIME: 12.5 SECONDS (ref 10.2–13)
QRS AXIS: -6 DEGREES
QRSD INTERVAL: 92 MS
QT INTERVAL: 406 MS
QTC INTERVAL: 462 MS
RBC # BLD AUTO: 5.03 MILLION/UL (ref 3.9–5.2)
RBC MORPH BLD: NORMAL
SODIUM SERPL-SCNC: 135 MMOL/L (ref 134–143)
T WAVE AXIS: 50 DEGREES
TOTAL CELLS COUNTED SPEC: 100
TROPONIN I SERPL-MCNC: <0.03 NG/ML
VENTRICULAR RATE: 78 BPM
WBC # BLD AUTO: 11.2 THOUSAND/UL (ref 4.8–10.8)

## 2019-09-18 PROCEDURE — 93970 EXTREMITY STUDY: CPT

## 2019-09-18 PROCEDURE — 85027 COMPLETE CBC AUTOMATED: CPT | Performed by: EMERGENCY MEDICINE

## 2019-09-18 PROCEDURE — 99285 EMERGENCY DEPT VISIT HI MDM: CPT

## 2019-09-18 PROCEDURE — 82948 REAGENT STRIP/BLOOD GLUCOSE: CPT

## 2019-09-18 PROCEDURE — 71045 X-RAY EXAM CHEST 1 VIEW: CPT

## 2019-09-18 PROCEDURE — 85610 PROTHROMBIN TIME: CPT | Performed by: EMERGENCY MEDICINE

## 2019-09-18 PROCEDURE — 85007 BL SMEAR W/DIFF WBC COUNT: CPT | Performed by: EMERGENCY MEDICINE

## 2019-09-18 PROCEDURE — A9540 TC99M MAA: HCPCS

## 2019-09-18 PROCEDURE — 94640 AIRWAY INHALATION TREATMENT: CPT

## 2019-09-18 PROCEDURE — 94760 N-INVAS EAR/PLS OXIMETRY 1: CPT

## 2019-09-18 PROCEDURE — 80053 COMPREHEN METABOLIC PANEL: CPT | Performed by: EMERGENCY MEDICINE

## 2019-09-18 PROCEDURE — 85730 THROMBOPLASTIN TIME PARTIAL: CPT | Performed by: EMERGENCY MEDICINE

## 2019-09-18 PROCEDURE — 99223 1ST HOSP IP/OBS HIGH 75: CPT | Performed by: NURSE PRACTITIONER

## 2019-09-18 PROCEDURE — A9539 TC99M PENTETATE: HCPCS

## 2019-09-18 PROCEDURE — 83735 ASSAY OF MAGNESIUM: CPT | Performed by: EMERGENCY MEDICINE

## 2019-09-18 PROCEDURE — 84484 ASSAY OF TROPONIN QUANT: CPT | Performed by: EMERGENCY MEDICINE

## 2019-09-18 PROCEDURE — 93010 ELECTROCARDIOGRAM REPORT: CPT | Performed by: INTERNAL MEDICINE

## 2019-09-18 PROCEDURE — 94664 DEMO&/EVAL PT USE INHALER: CPT

## 2019-09-18 PROCEDURE — 78582 LUNG VENTILAT&PERFUS IMAGING: CPT

## 2019-09-18 PROCEDURE — 83880 ASSAY OF NATRIURETIC PEPTIDE: CPT | Performed by: EMERGENCY MEDICINE

## 2019-09-18 PROCEDURE — 93005 ELECTROCARDIOGRAM TRACING: CPT

## 2019-09-18 PROCEDURE — 36415 COLL VENOUS BLD VENIPUNCTURE: CPT | Performed by: EMERGENCY MEDICINE

## 2019-09-18 PROCEDURE — 93970 EXTREMITY STUDY: CPT | Performed by: SURGERY

## 2019-09-18 PROCEDURE — 96372 THER/PROPH/DIAG INJ SC/IM: CPT

## 2019-09-18 RX ORDER — ALBUTEROL SULFATE 2.5 MG/3ML
2.5 SOLUTION RESPIRATORY (INHALATION) EVERY 4 HOURS PRN
Status: DISCONTINUED | OUTPATIENT
Start: 2019-09-18 | End: 2019-09-22 | Stop reason: HOSPADM

## 2019-09-18 RX ORDER — ONDANSETRON 2 MG/ML
4 INJECTION INTRAMUSCULAR; INTRAVENOUS EVERY 6 HOURS PRN
Status: DISCONTINUED | OUTPATIENT
Start: 2019-09-18 | End: 2019-09-22 | Stop reason: HOSPADM

## 2019-09-18 RX ORDER — IPRATROPIUM BROMIDE AND ALBUTEROL SULFATE 2.5; .5 MG/3ML; MG/3ML
3 SOLUTION RESPIRATORY (INHALATION) ONCE
Status: COMPLETED | OUTPATIENT
Start: 2019-09-18 | End: 2019-09-18

## 2019-09-18 RX ORDER — ATENOLOL 25 MG/1
100 TABLET ORAL DAILY
Status: DISCONTINUED | OUTPATIENT
Start: 2019-09-18 | End: 2019-09-22 | Stop reason: HOSPADM

## 2019-09-18 RX ORDER — ACETAMINOPHEN 325 MG/1
650 TABLET ORAL EVERY 6 HOURS PRN
Status: DISCONTINUED | OUTPATIENT
Start: 2019-09-18 | End: 2019-09-22 | Stop reason: HOSPADM

## 2019-09-18 RX ORDER — OMEPRAZOLE 40 MG/1
40 CAPSULE, DELAYED RELEASE ORAL DAILY
COMMUNITY
End: 2022-01-01 | Stop reason: HOSPADM

## 2019-09-18 RX ORDER — REPAGLINIDE 1 MG/1
TABLET ORAL
Refills: 3 | COMMUNITY
Start: 2019-06-18 | End: 2022-01-01 | Stop reason: SINTOL

## 2019-09-18 RX ORDER — NICOTINE 21 MG/24HR
1 PATCH, TRANSDERMAL 24 HOURS TRANSDERMAL DAILY
Status: DISCONTINUED | OUTPATIENT
Start: 2019-09-18 | End: 2019-09-22 | Stop reason: HOSPADM

## 2019-09-18 RX ORDER — FAMOTIDINE 20 MG/1
10 TABLET, FILM COATED ORAL 2 TIMES DAILY
Status: DISCONTINUED | OUTPATIENT
Start: 2019-09-18 | End: 2019-09-22 | Stop reason: HOSPADM

## 2019-09-18 RX ORDER — PAROXETINE HYDROCHLORIDE 20 MG/1
40 TABLET, FILM COATED ORAL
Status: DISCONTINUED | OUTPATIENT
Start: 2019-09-18 | End: 2019-09-22 | Stop reason: HOSPADM

## 2019-09-18 RX ORDER — FAMOTIDINE 20 MG/1
20 TABLET, FILM COATED ORAL 2 TIMES DAILY
COMMUNITY
End: 2022-01-01 | Stop reason: CLARIF

## 2019-09-18 RX ORDER — ASPIRIN 81 MG/1
81 TABLET ORAL DAILY
COMMUNITY
End: 2022-01-01

## 2019-09-18 RX ORDER — B-COMPLEX WITH VITAMIN C
1 TABLET ORAL
Status: DISCONTINUED | OUTPATIENT
Start: 2019-09-19 | End: 2019-09-22 | Stop reason: HOSPADM

## 2019-09-18 RX ORDER — ALBUTEROL SULFATE 2.5 MG/3ML
2 SOLUTION RESPIRATORY (INHALATION) ONCE
Status: COMPLETED | OUTPATIENT
Start: 2019-09-18 | End: 2019-09-18

## 2019-09-18 RX ORDER — PANTOPRAZOLE SODIUM 40 MG/1
40 TABLET, DELAYED RELEASE ORAL
Status: DISCONTINUED | OUTPATIENT
Start: 2019-09-19 | End: 2019-09-22 | Stop reason: HOSPADM

## 2019-09-18 RX ORDER — ATENOLOL 100 MG/1
TABLET ORAL
COMMUNITY
End: 2022-01-01

## 2019-09-18 RX ORDER — METHYLPREDNISOLONE SODIUM SUCCINATE 40 MG/ML
40 INJECTION, POWDER, LYOPHILIZED, FOR SOLUTION INTRAMUSCULAR; INTRAVENOUS EVERY 8 HOURS SCHEDULED
Status: DISCONTINUED | OUTPATIENT
Start: 2019-09-18 | End: 2019-09-18

## 2019-09-18 RX ORDER — ATORVASTATIN CALCIUM 80 MG/1
80 TABLET, FILM COATED ORAL DAILY
Status: DISCONTINUED | OUTPATIENT
Start: 2019-09-18 | End: 2019-09-22 | Stop reason: HOSPADM

## 2019-09-18 RX ORDER — METHYLPREDNISOLONE SODIUM SUCCINATE 40 MG/ML
40 INJECTION, POWDER, LYOPHILIZED, FOR SOLUTION INTRAMUSCULAR; INTRAVENOUS EVERY 8 HOURS SCHEDULED
Status: DISCONTINUED | OUTPATIENT
Start: 2019-09-19 | End: 2019-09-20

## 2019-09-18 RX ORDER — ASPIRIN 81 MG/1
81 TABLET ORAL DAILY
Status: DISCONTINUED | OUTPATIENT
Start: 2019-09-18 | End: 2019-09-22 | Stop reason: HOSPADM

## 2019-09-18 RX ORDER — AMLODIPINE BESYLATE 5 MG/1
5 TABLET ORAL DAILY
Refills: 3 | COMMUNITY
Start: 2019-06-18 | End: 2022-01-01 | Stop reason: HOSPADM

## 2019-09-18 RX ORDER — ATORVASTATIN CALCIUM 80 MG/1
80 TABLET, FILM COATED ORAL DAILY
COMMUNITY
End: 2022-01-01 | Stop reason: HOSPADM

## 2019-09-18 RX ORDER — METHYLPREDNISOLONE SOD SUCC 125 MG
1 VIAL (EA) INJECTION ONCE
Status: COMPLETED | OUTPATIENT
Start: 2019-09-18 | End: 2019-09-18

## 2019-09-18 RX ORDER — POTASSIUM CHLORIDE 20MEQ/15ML
40 LIQUID (ML) ORAL ONCE
Status: DISCONTINUED | OUTPATIENT
Start: 2019-09-18 | End: 2019-09-18

## 2019-09-18 RX ORDER — LANOLIN ALCOHOL/MO/W.PET/CERES
1 CREAM (GRAM) TOPICAL 2 TIMES DAILY
COMMUNITY
End: 2019-10-15 | Stop reason: ALTCHOICE

## 2019-09-18 RX ORDER — IPRATROPIUM BROMIDE AND ALBUTEROL SULFATE 2.5; .5 MG/3ML; MG/3ML
3 SOLUTION RESPIRATORY (INHALATION)
Status: DISCONTINUED | OUTPATIENT
Start: 2019-09-18 | End: 2019-09-18

## 2019-09-18 RX ORDER — PAROXETINE HYDROCHLORIDE 40 MG/1
TABLET, FILM COATED ORAL
COMMUNITY

## 2019-09-18 RX ORDER — IPRATROPIUM BROMIDE AND ALBUTEROL SULFATE .5; 3 MG/3ML; MG/3ML
1 SOLUTION RESPIRATORY (INHALATION) ONCE
Status: COMPLETED | OUTPATIENT
Start: 2019-09-18 | End: 2019-09-18

## 2019-09-18 RX ORDER — AMLODIPINE BESYLATE 5 MG/1
5 TABLET ORAL DAILY
Status: DISCONTINUED | OUTPATIENT
Start: 2019-09-18 | End: 2019-09-22 | Stop reason: HOSPADM

## 2019-09-18 RX ORDER — PAROXETINE HYDROCHLORIDE 20 MG/1
40 TABLET, FILM COATED ORAL DAILY
Status: DISCONTINUED | OUTPATIENT
Start: 2019-09-18 | End: 2019-09-18

## 2019-09-18 RX ADMIN — SODIUM CHLORIDE 500 ML: 0.9 INJECTION, SOLUTION INTRAVENOUS at 11:31

## 2019-09-18 RX ADMIN — NICOTINE 1 PATCH: 21 PATCH, EXTENDED RELEASE TRANSDERMAL at 14:42

## 2019-09-18 RX ADMIN — IPRATROPIUM BROMIDE AND ALBUTEROL SULFATE 3 ML: 2.5; .5 SOLUTION RESPIRATORY (INHALATION) at 14:10

## 2019-09-18 RX ADMIN — FAMOTIDINE 10 MG: 20 TABLET ORAL at 17:22

## 2019-09-18 RX ADMIN — ENOXAPARIN SODIUM 80 MG: 100 INJECTION SUBCUTANEOUS at 11:31

## 2019-09-18 RX ADMIN — IPRATROPIUM BROMIDE AND ALBUTEROL SULFATE 3 ML: 2.5; .5 SOLUTION RESPIRATORY (INHALATION) at 09:45

## 2019-09-18 RX ADMIN — ASPIRIN 81 MG: 81 TABLET, COATED ORAL at 14:42

## 2019-09-18 RX ADMIN — INSULIN LISPRO 2 UNITS: 100 INJECTION, SOLUTION INTRAVENOUS; SUBCUTANEOUS at 14:42

## 2019-09-18 RX ADMIN — PAROXETINE 40 MG: 20 TABLET, FILM COATED ORAL at 21:51

## 2019-09-18 RX ADMIN — IPRATROPIUM BROMIDE AND ALBUTEROL SULFATE 3 ML: 2.5; .5 SOLUTION RESPIRATORY (INHALATION) at 09:59

## 2019-09-18 RX ADMIN — INSULIN LISPRO 3 UNITS: 100 INJECTION, SOLUTION INTRAVENOUS; SUBCUTANEOUS at 17:22

## 2019-09-18 NOTE — ASSESSMENT & PLAN NOTE
Lab Results   Component Value Date    HGBA1C 7 8 (H) 07/08/2019       No results for input(s): POCGLU in the last 72 hours      Blood Sugar Average: Last 72 hrs:  · Will place on ISS while in-house and on steroid   · Hold PO medications for now

## 2019-09-18 NOTE — ED PROVIDER NOTES
History  Chief Complaint   Patient presents with    Shortness of Breath     From home, SOB starting several days ago, progressive, at rest, worse w/exertion, w/cough productive of scant white sputum, no chest pain or fever  Denies chronic lung disease, w/only one prior (remote) episode of wheezing reported  Does smoke cigarettes  Called EMS who found patient wheezing w/hypoxemia, and treated en-route w/Duoneb, albuterol x 2, and solumedrol IV, w/improvement  Does have chronic LLE swelling (for decades) w/prior DVT not currently on anti-coagulation  Prior to Admission Medications   Prescriptions Last Dose Informant Patient Reported? Taking? LINAGLIPTIN PO 9/18/2019 at Unknown time  Yes Yes   Sig: Take 5 mg by mouth   PARoxetine (PAXIL) 40 MG tablet 9/17/2019 at Unknown time  Yes Yes   Sig: Take by mouth   amLODIPine (NORVASC) 5 mg tablet 9/18/2019 at Unknown time  Yes Yes   Sig: Take 5 mg by mouth daily   aspirin (ECOTRIN LOW STRENGTH) 81 mg EC tablet 9/18/2019 at Unknown time  Yes Yes   Sig: Take 81 mg by mouth daily   atenolol (TENORMIN) 100 mg tablet 9/18/2019 at Unknown time  Yes Yes   Sig: Take by mouth   atorvastatin (LIPITOR) 80 mg tablet 9/18/2019 at Unknown time  Yes Yes   Sig: Take 80 mg by mouth daily   calcium citrate-vitamin D (CITRACAL+D) 315-200 MG-UNIT per tablet Not Taking at Unknown time  Yes No   Sig: Take 1 tablet by mouth 2 (two) times a day   famotidine (PEPCID) 20 mg tablet 9/18/2019 at Unknown time  Yes Yes   Sig: Take 20 mg by mouth 2 (two) times a day   omeprazole (PriLOSEC) 40 MG capsule 9/18/2019 at Unknown time  Yes Yes   Sig: Take 40 mg by mouth daily   repaglinide (PRANDIN) 1 mg tablet 9/18/2019 at Unknown time  Yes Yes   Sig: TAKE 1 TAB BY MOUTH THREE TIMES A DAY BEFORE MEALS  Facility-Administered Medications: None       Past Medical History:   Diagnosis Date    Diabetes mellitus ([facility] [address] )     Hypertension        History reviewed   No pertinent surgical history  Family History   Problem Relation Age of Onset    No Known Problems Mother      I have reviewed and agree with the history as documented  Social History     Tobacco Use    Smoking status: Current Every Day Smoker     Packs/day: 0 20     Years: 40 00     Pack years: 8 00     Types: Cigarettes    Smokeless tobacco: Never Used   Substance Use Topics    Alcohol use: Never     Frequency: Never    Drug use: Never        Review of Systems   Constitutional: Negative for chills and fever  HENT: Negative for rhinorrhea and sore throat  Eyes: Negative for visual disturbance  Respiratory: Positive for cough and shortness of breath  Cardiovascular: Positive for leg swelling  Negative for chest pain  (chronic LLE swelling)   Gastrointestinal: Negative for abdominal pain, diarrhea, nausea and vomiting  Genitourinary: Negative for dysuria  Musculoskeletal: Negative for back pain and myalgias  Skin: Negative for rash  Neurological: Negative for dizziness and headaches  Psychiatric/Behavioral: Negative for confusion  All other systems reviewed and are negative  Physical Exam  Physical Exam   Constitutional: She is oriented to person, place, and time  She appears well-developed and well-nourished  HENT:   Nose: Nose normal    Mouth/Throat: Oropharynx is clear and moist  No oropharyngeal exudate  Eyes: Pupils are equal, round, and reactive to light  Conjunctivae and EOM are normal  No scleral icterus  Neck: Normal range of motion  Neck supple  No JVD present  No tracheal deviation present  Cardiovascular: Normal rate, regular rhythm and normal heart sounds  No murmur heard  Pulmonary/Chest: No stridor  She is in respiratory distress  She has wheezes  She has rhonchi  She has no rales  Abdominal: Soft  Bowel sounds are normal  There is no tenderness  There is no guarding  Musculoskeletal: Normal range of motion  She exhibits no edema or tenderness     Generalized left leg swelling present w/chronic skin changes, no calf tenderness   Neurological: She is alert and oriented to person, place, and time  No cranial nerve deficit or sensory deficit  She exhibits normal muscle tone  5/5 motor, nl sens   Skin: Skin is warm and dry  Psychiatric: She has a normal mood and affect  Her behavior is normal    Nursing note and vitals reviewed        Vital Signs  ED Triage Vitals   Temperature Pulse Respirations Blood Pressure SpO2   09/18/19 0923 09/18/19 0923 09/18/19 0923 09/18/19 0923 09/18/19 0903   (!) 97 4 °F (36 3 °C) 78 20 135/70 (!) 71 %      Temp Source Heart Rate Source Patient Position - Orthostatic VS BP Location FiO2 (%)   09/18/19 0923 09/18/19 0923 09/18/19 0923 09/18/19 0923 --   Tympanic Monitor Lying Left arm       Pain Score       09/18/19 0923       No Pain           Vitals:    09/18/19 1251 09/18/19 1410 09/18/19 2210 09/18/19 2241   BP: 106/62   129/58   Pulse: 80 82 86 82   Patient Position - Orthostatic VS: Lying   Lying         Visual Acuity      ED Medications  Medications   ondansetron (ZOFRAN) injection 4 mg (has no administration in time range)   nicotine (NICODERM CQ) 21 mg/24 hr TD 24 hr patch 1 patch (1 patch Transdermal Medication Applied 9/18/19 1442)   enoxaparin (LOVENOX) subcutaneous injection 80 mg (has no administration in time range)   acetaminophen (TYLENOL) tablet 650 mg (has no administration in time range)   albuterol inhalation solution 2 5 mg (has no administration in time range)   insulin lispro (HumaLOG) 100 units/mL subcutaneous injection 1-6 Units (3 Units Subcutaneous Given 9/18/19 1722)   insulin lispro (HumaLOG) 100 units/mL subcutaneous injection 1-6 Units (3 Units Subcutaneous Not Given 9/18/19 2136)   methylPREDNISolone sodium succinate (Solu-MEDROL) injection 40 mg (40 mg Intravenous Given 9/19/19 0617)   amLODIPine (NORVASC) tablet 5 mg (5 mg Oral Not Given 9/18/19 1425)   aspirin (ECOTRIN LOW STRENGTH) EC tablet 81 mg (81 mg Oral Given 9/18/19 1442)   atenolol (TENORMIN) tablet 100 mg (100 mg Oral Not Given 9/18/19 1426)   atorvastatin (LIPITOR) tablet 80 mg (80 mg Oral Not Given 9/18/19 1426)   calcium carbonate-vitamin D (OSCAL-D) 500 mg-200 units per tablet 1 tablet (has no administration in time range)   famotidine (PEPCID) tablet 10 mg (10 mg Oral Given 9/18/19 1722)   pantoprazole (PROTONIX) EC tablet 40 mg (40 mg Oral Given 9/19/19 0617)   pneumococcal 23-valent polysaccharide vaccine (PNEUMOVAX-23) injection 0 5 mL (has no administration in time range)   influenza vaccine, high-dose (FLUZONE HIGH-DOSE) IM injection DAVE 0 5 mL (has no administration in time range)   PARoxetine (PAXIL) tablet 40 mg (40 mg Oral Given 9/18/19 2151)   albuterol (FOR EMS ONLY) (2 5 mg/3 mL) 0 083 % inhalation solution 5 mg (0 mg Does not apply Given to EMS 9/18/19 0910)   ipratropium-albuterol (FOR EMS ONLY) (DUO-NEB) 0 5-2 5 mg/3 mL inhalation solution 3 mL (0 mL Does not apply Given to EMS 9/18/19 0910)   methylPREDNISolone sodium succinate (FOR EMS ONLY) (Solu-MEDROL) 125 MG injection 125 mg (0 mg Does not apply Given to EMS 9/18/19 0910)   ipratropium-albuterol (DUO-NEB) 0 5-2 5 mg/3 mL inhalation solution 3 mL (3 mL Nebulization Given 9/18/19 0959)   ipratropium-albuterol (DUO-NEB) 0 5-2 5 mg/3 mL inhalation solution 3 mL (3 mL Nebulization Given 9/18/19 0945)   enoxaparin (LOVENOX) subcutaneous injection 80 mg (80 mg Subcutaneous Given 9/18/19 1131)   sodium chloride 0 9 % bolus 500 mL (500 mL Intravenous New Bag 9/18/19 1131)       Diagnostic Studies  Results Reviewed     Procedure Component Value Units Date/Time    Protime-INR [[id number]]  (Normal) Collected:  09/18/19 1132    Lab Status:  Final result Specimen:  Blood from Arm, Right Updated:  09/18/19 1149     Protime 12 5 seconds      INR 1 08    APTT [[id number]]  (Normal) Collected:  09/18/19 1132    Lab Status:  Final result Specimen:  Blood from Arm, Right Updated:  09/18/19 1149     PTT 27 seconds     CBC and differential [[id number]]  (Abnormal) Collected:  09/18/19 1124    Lab Status:  Final result Specimen:  Blood from Arm, Right Updated:  09/18/19 1136     WBC 11 20 Thousand/uL      RBC 5 03 Million/uL      Hemoglobin 14 8 g/dL      Hematocrit 45 3 %      MCV 90 fL      MCH 29 4 pg      MCHC 32 7 g/dL      RDW 14 5 %      MPV 7 9 fL      Platelets 725 Thousands/uL     Magnesium [[id number]]  (Normal) Collected:  09/18/19 0944    Lab Status:  Final result Specimen:  Blood from Arm, Left Updated:  09/18/19 1027     Magnesium 1 9 mg/dL     Comprehensive metabolic panel [[id number]]  (Abnormal) Collected:  09/18/19 0944    Lab Status:  Final result Specimen:  Blood from Arm, Left Updated:  09/18/19 1027     Sodium 135 mmol/L      Potassium 5 3 mmol/L      Chloride 101 mmol/L      CO2 26 mmol/L      ANION GAP 8 mmol/L      BUN 21 mg/dL      Creatinine 1 48 mg/dL      Glucose 264 mg/dL      Calcium 9 3 mg/dL      AST 18 U/L      ALT 10 U/L      Alkaline Phosphatase 71 U/L      Total Protein 7 4 g/dL      Albumin 3 7 g/dL      Total Bilirubin 1 00 mg/dL      eGFR 34 ml/min/1 73sq m     Narrative:       [place] guidelines for Chronic Kidney Disease (CKD):     Stage 1 with normal or high GFR (GFR > 90 mL/min/1 73 square meters)    Stage 2 Mild CKD (GFR = 60-89 mL/min/1 73 square meters)    Stage 3A Moderate CKD (GFR = 45-59 mL/min/1 73 square meters)    Stage 3B Moderate CKD (GFR = 30-44 mL/min/1 73 square meters)    Stage 4 Severe CKD (GFR = 15-29 mL/min/1 73 square meters)    Stage 5 End Stage CKD (GFR <15 mL/min/1 73 square meters)  Note: GFR calculation is accurate only with a steady state creatinine    B-Type Natriuretic Peptide [facility] ONLY) [[id number]]  (Abnormal) Collected:  09/18/19 0944    Lab Status:  Final result Specimen:  Blood from Arm, Left Updated:  09/18/19 1026      pg/mL     Troponin I [[id number]]  (Normal) Collected:  09/18/19 0944 Lab Status:  Final result Specimen:  Blood from Arm, Left Updated:  09/18/19 1023     Troponin I <0 03 ng/mL                  NM lung ventilation / perfusion   Final Result by Edilberto Hernandez MD (09/18 1548)      The probability for pulmonary embolus is low  Workstation performed: [id number]         VAS lower limb venous duplex study, complete bilateral   Final Result by Pallavi Coats MD (09/18 1630)      XR chest 1 view portable   Final Result by Antonina Hurd MD (09/18 1021)      No acute cardiopulmonary disease              Workstation performed: [id number]                    Procedures  ECG 12 Lead Documentation Only  Date/Time: 9/18/2019 10:07 AM  Performed by: Jona Muller MD  Authorized by: Jona Muller MD     ECG reviewed by me, the ED Provider: yes    Patient location:  ED  Comments:      NSR at 80, ST-T waves WNL, intervals WNL, no PVCs           ED Course  ED Course as of Sep 19 0718   Wed Sep 18, 2019   0910 Initial Impression/MDM: SOB w/wheezing and reported hypoxemia, denies specific cardio-pulmonary hx w/only one prior episode (remote) of wheezing; received solumedrol IV and Duoneb pre-hospital; will screen with EKG, CXR and labs, and also get US of LLE to r/o DVT          1147 Improved, but with mild residual wheezing; has DVT LLE (femoral and popliteal) so Lovenox SC given; can not get CTA chest to r/o PE due to CKD and low GFR so V/Q ordered                                   MDM    Disposition  Final diagnoses:   Acute respiratory distress   Bronchospasm   Hypoxemia   DVT of deep femoral vein, left ([address] )     Time reflects when diagnosis was documented in both MDM as applicable and the Disposition within this note     Time User Action Codes Description Comment    9/18/2019 11:33 AM Elizae Abo Add [R06 03] Acute respiratory distress     9/18/2019 11:33 AM Elizae Bon Add [J98 01] Bronchospasm     9/18/2019 11:33 AM Jullie Abo Add [R09 02] Hypoxemia     9/18/2019 11:33 AM Freddie Villarreal Add [T96 283] DVT of deep femoral vein, left [facility])       ED Disposition     ED Disposition Condition Date/Time Comment    Admit Stable Wed Sep 18, 2019 11:33 AM Case was discussed with Dr Marc Sutherland and the patient's admission status was agreed to be Admission Status: inpatient status to the service of Dr Marc Sutherland   Follow-up Information    None         Current Discharge Medication List      CONTINUE these medications which have NOT CHANGED    Details   amLODIPine (NORVASC) 5 mg tablet Take 5 mg by mouth daily  Refills: 3      aspirin (ECOTRIN LOW STRENGTH) 81 mg EC tablet Take 81 mg by mouth daily      atenolol (TENORMIN) 100 mg tablet Take by mouth      atorvastatin (LIPITOR) 80 mg tablet Take 80 mg by mouth daily      famotidine (PEPCID) 20 mg tablet Take 20 mg by mouth 2 (two) times a day      LINAGLIPTIN PO Take 5 mg by mouth      omeprazole (PriLOSEC) 40 MG capsule Take 40 mg by mouth daily      PARoxetine (PAXIL) 40 MG tablet Take by mouth      repaglinide (PRANDIN) 1 mg tablet TAKE 1 TAB BY MOUTH THREE TIMES A DAY BEFORE MEALS  Refills: 3      calcium citrate-vitamin D (CITRACAL+D) 315-200 MG-UNIT per tablet Take 1 tablet by mouth 2 (two) times a day           No discharge procedures on file      ED Provider  Electronically Signed by           Glenn Esparza MD  09/19/19 0517

## 2019-09-18 NOTE — H&P
H&P- Xiomara Bailey 1945, 66 y o  female MRN: [id number]    Unit/Bed#: -02 Encounter: [id number]    Primary Care Provider: Marysol Bobo MD (Inactive)   Date and time admitted to hospital: 9/18/2019  9:08 AM        * Acute respiratory failure with hypoxia ([facility] )  Assessment & Plan  · Unclear etiology  · This can be secondary to possible PE versus viral bronchitis with bronchospasm as the patient is a lifelong smoker  Mildly elevated BNP  Chest x-ray shows no infiltrates or pulmonary vascular congestions  · V/Q scan is pending  · Check echocardiogram  · Start Solu-Medrol, nebulizer treatment, respiratory protocol  · Oxygen supplement to keep pulse ox greater than 92%  · Check procalcitonin  · Will hold off on starting antibiotics at this time as the patient is afebrile and no leukocytosis  Type 2 diabetes mellitus without complication, without long-term current use of insulin ([facility])  Assessment & Plan  Lab Results   Component Value Date    HGBA1C 7 8 (H) 07/08/2019       Recent Labs     09/18/19  1351   POCGLU 221*       Blood Sugar Average: Last 72 hrs:  · Will place on ISS while in-house and on steroid   · Hold PO medications for now    Stage 3 chronic kidney disease ([facility] )  Assessment & Plan  · Baseline creatinine appears to be 1 4- 1 7 mg/dL   · Will continue to monitor renal function closely   · If worsen will consult nephrology     Tobacco abuse  Assessment & Plan  · Smoking cessation discussed  · Patient is not ready to quit smoking at this time  · Will use nicotine patch while in house    DVT (deep venous thrombosis) ([facility] )  Assessment & Plan  · The patient with history of DVT in the left lower extremity approximately 50 years ago following her pregnancy with PE  At that time she was treated with anticoagulation  · Venous duplex shows evidence of acute versus subacute nonocclusive DVT of proximal femoral vein and popliteal vein    There are areas of nonocclusive superficial thrombophlebitis noted in the great saphenous vein from mid calf to upper thigh 3 7 cm from saphenofemoral junction  · Likely will need to be on anticoagulation lifelong  · The patient will need to follow up with Hematology/Oncology as an outpatient  · Will continue on Lovenox 1 milligram/kilogram renally adjusted dose pending V/Q scan and echocardiogram results  · If no indication of the embolectomy will start the patient on eliquis        VTE Prophylaxis: Heparin  Code Status: Full code  POLST: POLST is not applicable to this patient  Discussion with family: n/a     Anticipated Length of Stay:  Patient will be admitted on an Inpatient basis with an anticipated length of stay of  > 2 midnights  Justification for Hospital Stay: acute respiratory failure hypoxia    Chief Complaint:   Shortness of breath     History of Present Illness:    Owen Orozco is a 66 y o  female who presents with shortness of breath  The patient with history of remote DVT following pregnancy, PE and tobacco use  She states that she has been in her normal state of health up until Sunday when she developed coughing with some white sputum production  She states that on Monday she started to develop some dyspnea and wheezing  Today her dyspnea has progressively worsening to the point that she could not exert herself or walk around and subsequently came into the ED for further evaluation  The patient has no prior COPD or asthma history however has been smoking for 60 years  She also has a chronic left lower extremity edema which developed after at DVT following her pregnancy  She denies any left leg pain or any calf tenderness  She does not think that the left lower extremity edema has worsen  In the ED, venous duplex shows an evidence of acute versus subacute nonocclusive DVT of the proximal femoral vein and popliteal vein  The patient is not a candidate for CTA chest due to chronic kidney disease          Review of Systems:  Review of Systems Constitutional: Negative for activity change, appetite change, chills, diaphoresis and fever  HENT: Negative for congestion, ear pain, hearing loss, tinnitus and trouble swallowing  Eyes: Negative for photophobia, pain, discharge, itching and visual disturbance  Respiratory: Positive for cough, shortness of breath and wheezing  Negative for stridor  Cardiovascular: Positive for leg swelling (left leg- chronic )  Negative for chest pain and palpitations  Gastrointestinal: Negative for abdominal pain, blood in stool, constipation, diarrhea, nausea and vomiting  Endocrine: Negative for cold intolerance, heat intolerance, polydipsia, polyphagia and polyuria  Genitourinary: Negative for difficulty urinating, dysuria, frequency, hematuria and urgency  Musculoskeletal: Negative for back pain, gait problem and neck stiffness  Skin: Negative for pallor, rash and wound  Allergic/Immunologic: Negative for environmental allergies, food allergies and immunocompromised state  Neurological: Negative for dizziness, tremors, speech difficulty, weakness, light-headedness, numbness and headaches  Hematological: Negative for adenopathy  Does not bruise/bleed easily  Psychiatric/Behavioral: Negative for confusion, hallucinations and sleep disturbance  Past Medical and Surgical History:   Past Medical History:   Diagnosis Date    Diabetes mellitus ([facility] [address] )     Hypertension        History reviewed  No pertinent surgical history  Meds/Allergies:  Prior to Admission medications    Medication Sig Start Date End Date Taking?  Authorizing Provider   amLODIPine (NORVASC) 5 mg tablet Take 5 mg by mouth daily 6/18/19  Yes Historical Provider, MD   aspirin (ECOTRIN LOW STRENGTH) 81 mg EC tablet Take 81 mg by mouth daily   Yes Historical Provider, MD   atenolol (TENORMIN) 100 mg tablet Take by mouth   Yes Historical Provider, MD   atorvastatin (LIPITOR) 80 mg tablet Take 80 mg by mouth daily   Yes Historical Provider, MD   famotidine (PEPCID) 20 mg tablet Take 20 mg by mouth 2 (two) times a day   Yes Historical Provider, MD   LINAGLIPTIN PO Take 5 mg by mouth   Yes Historical Provider, MD   omeprazole (PriLOSEC) 40 MG capsule Take 40 mg by mouth daily   Yes Historical Provider, MD   PARoxetine (PAXIL) 40 MG tablet Take by mouth   Yes Historical Provider, MD   repaglinide (PRANDIN) 1 mg tablet TAKE 1 TAB BY MOUTH THREE TIMES A DAY BEFORE MEALS  6/18/19  Yes Historical Provider, MD   calcium citrate-vitamin D (CITRACAL+D) 315-200 MG-UNIT per tablet Take 1 tablet by mouth 2 (two) times a day    Historical Provider, MD     I have reviewed home medications with patient personally  Allergies: No Known Allergies    Social History:  Marital Status:    Occupation: retired   Patient Pre-hospital Living Situation: family   Patient Pre-hospital Level of Mobility: independent   Patient Pre-hospital Diet Restrictions: diabetic   Substance Use History:     Social History     Substance and Sexual Activity   Alcohol Use Never    Frequency: Never     Social History     Tobacco Use   Smoking Status Current Every Day Smoker    Packs/day: 0 20    Years: 40 00    Pack years: 8 00    Types: Cigarettes   Smokeless Tobacco Never Used     Social History     Substance and Sexual Activity   Drug Use Never       Family History:  I have reviewed the patients family history    Physical Exam:   Vitals:   Blood Pressure: 106/62 (09/18/19 1251)  Pulse: 82 (09/18/19 1410)  Temperature: 98 8 °F (37 1 °C) (09/18/19 1251)  Temp Source: Tympanic (09/18/19 1251)  Respirations: 16 (09/18/19 1410)  Height: 5' 3" (160 cm) (09/18/19 1251)  Weight - Scale: 77 4 kg (170 lb 10 9 oz) (09/18/19 1251)  SpO2: 91 % (09/18/19 1410)    Physical Exam   Constitutional: She is oriented to person, place, and time  She appears well-developed and well-nourished  No distress  HENT:   Head: Normocephalic and atraumatic     Mouth/Throat: Oropharynx is clear and moist    Eyes: Pupils are equal, round, and reactive to light  Conjunctivae and EOM are normal    Neck: Normal range of motion  Neck supple  No thyromegaly present  Cardiovascular: Normal rate, regular rhythm and normal heart sounds  Exam reveals no gallop and no friction rub  No murmur heard  Pulmonary/Chest: Effort normal  She has wheezes (through out the lung fileds)  She has no rales  Abdominal: Soft  Bowel sounds are normal  She exhibits no distension  There is no tenderness  There is no rebound  Musculoskeletal: Normal range of motion  She exhibits edema (non-pitting LLE)  She exhibits no tenderness or deformity  Neurological: She is alert and oriented to person, place, and time  No cranial nerve deficit  Skin: Skin is warm and dry  No rash noted  No erythema  Psychiatric: She has a normal mood and affect  Her behavior is normal  Thought content normal    Vitals reviewed  Additional Data:   Lab Results: I have personally reviewed pertinent reports  Results from last 7 days   Lab Units 09/18/19  1124   WBC Thousand/uL 11 20*   HEMOGLOBIN g/dL 14 8   HEMATOCRIT % 45 3   PLATELETS Thousands/uL 203   LYMPHO PCT % 3*   MONO PCT % 3*     Results from last 7 days   Lab Units 09/18/19  0944   POTASSIUM mmol/L 5 3   CHLORIDE mmol/L 101   CO2 mmol/L 26   BUN mg/dL 21   CREATININE mg/dL 1 48*   CALCIUM mg/dL 9 3   ALK PHOS U/L 71   ALT U/L 10   AST U/L 18     Results from last 7 days   Lab Units 09/18/19  1132   INR  1 08     Results from last 7 days   Lab Units 09/18/19  1351   POC GLUCOSE mg/dl 221*           Imaging: I have personally reviewed pertinent reports  XR chest 1 view portable   Final Result by Jessica Muse MD (09/18 1021)      No acute cardiopulmonary disease              Workstation performed: [id number]         VAS lower limb venous duplex study, complete bilateral    (Results Pending)   NM lung ventilation / perfusion    (Results Pending)       EKG, Pathology, and Other Studies Reviewed on Admission:   · EKG: NSR HR 80    NetAccess/Epic Records Reviewed: Yes     ** Please Note: This note has been constructed using a voice recognition system   **

## 2019-09-18 NOTE — ASSESSMENT & PLAN NOTE
· The patient with history of DVT in the left lower extremity approximately 50 years ago following her pregnancy with PE  At that time she was treated with anticoagulation  · Venous duplex shows evidence of acute versus subacute nonocclusive DVT of proximal femoral vein and popliteal vein  There are areas of nonocclusive superficial thrombophlebitis noted in the great saphenous vein from mid calf to upper thigh 3 7 cm from saphenofemoral junction    · Likely will need to be on anticoagulation lifelong  · The patient will need to follow up with Hematology/Oncology as an outpatient  · Will continue on Lovenox 1 milligram/kilogram renally adjusted dose pending V/Q scan and echocardiogram results  · If no indication of the embolectomy will start the patient on eliquis

## 2019-09-18 NOTE — RESPIRATORY THERAPY NOTE
RT Protocol Note  [place] 66 y o  female MRN: [id number]  Unit/Bed#: -02 Encounter: [id number]    Assessment    Principal Problem:    Acute respiratory failure with hypoxia (HCC)  Active Problems:    DVT (deep venous thrombosis) (HCC)    Tobacco abuse    Stage 3 chronic kidney disease (HCC)    Type 2 diabetes mellitus without complication, without long-term current use of insulin (HCC)      Home Pulmonary Medications:    Home Devices/Therapy: Other (Comment)(none)    Past Medical History:   Diagnosis Date    Diabetes mellitus ([facility] [address] )     Hypertension      Social History     Socioeconomic History    Marital status:      Spouse name: None    Number of children: None    Years of education: None    Highest education level: None   Occupational History    None   Social Needs    Financial resource strain: None    Food insecurity:     Worry: None     Inability: None    Transportation needs:     Medical: None     Non-medical: None   Tobacco Use    Smoking status: Current Every Day Smoker     Packs/day: 0 20     Years: 40 00     Pack years: 8 00     Types: Cigarettes    Smokeless tobacco: Never Used   Substance and Sexual Activity    Alcohol use: Never     Frequency: Never    Drug use: Never    Sexual activity: None   Lifestyle    Physical activity:     Days per week: None     Minutes per session: None    Stress: None   Relationships    Social connections:     Talks on phone: None     Gets together: None     Attends Voodoo service: None     Active member of club or organization: None     Attends meetings of clubs or organizations: None     Relationship status: None    Intimate partner violence:     Fear of current or ex partner: None     Emotionally abused: None     Physically abused: None     Forced sexual activity: None   Other Topics Concern    None   Social History Narrative    None       Subjective         Objective    Physical Exam:   Assessment Type: Pre-treatment  General Appearance: Alert, Awake  Respiratory Pattern: Normal  Chest Assessment: Chest expansion symmetrical  Bilateral Breath Sounds: Diminished    Vitals:  Blood pressure 106/62, pulse (P) 82, temperature 98 8 °F (37 1 °C), temperature source Tympanic, resp  rate (P) 16, height 5' 3" (1 6 m), weight 77 4 kg (170 lb 10 9 oz), SpO2 91 %  Imaging and other studies: I have personally reviewed pertinent reports              Plan    Respiratory Plan: (P) No distress/Pulmonary history        Resp Comments: (P) D/C duoneb

## 2019-09-18 NOTE — PLAN OF CARE
Problem: PAIN - ADULT  Goal: Verbalizes/displays adequate comfort level or baseline comfort level  Description  Interventions:  - Encourage patient to monitor pain and request assistance  - Assess pain using appropriate pain scale  - Administer analgesics based on type and severity of pain and evaluate response  - Implement non-pharmacological measures as appropriate and evaluate response  - Consider cultural and social influences on pain and pain management  - Notify physician/advanced practitioner if interventions unsuccessful or patient reports new pain  Outcome: Progressing     Problem: INFECTION - ADULT  Goal: Absence or prevention of progression during hospitalization  Description  INTERVENTIONS:  - Assess and monitor for signs and symptoms of infection  - Monitor lab/diagnostic results  - Monitor all insertion sites, i e  indwelling lines, tubes, and drains  - Monitor endotracheal if appropriate and nasal secretions for changes in amount and color  - [place] appropriate cooling/warming therapies per order  - Administer medications as ordered  - Instruct and encourage patient and family to use good hand hygiene technique  - Identify and instruct in appropriate isolation precautions for identified infection/condition  Outcome: Progressing     Problem: SAFETY ADULT  Goal: Patient will remain free of falls  Description  INTERVENTIONS:  - Assess patient frequently for physical needs  -  Identify cognitive and physical deficits and behaviors that affect risk of falls    -  [place] fall precautions as indicated by assessment   - Educate patient/family on patient safety including physical limitations  - Instruct patient to call for assistance with activity based on assessment  - Modify environment to reduce risk of injury  - Consider OT/PT consult to assist with strengthening/mobility  Outcome: Progressing  Goal: Maintain or return to baseline ADL function  Description  INTERVENTIONS:  -  Assess patient's ability to carry out ADLs; assess patient's baseline for ADL function and identify physical deficits which impact ability to perform ADLs (bathing, care of mouth/teeth, toileting, grooming, dressing, etc )  - Assess/evaluate cause of self-care deficits   - Assess range of motion  - Assess patient's mobility; develop plan if impaired  - Assess patient's need for assistive devices and provide as appropriate  - Encourage maximum independence but intervene and supervise when necessary  - Involve family in performance of ADLs  - Assess for home care needs following discharge   - Consider OT consult to assist with ADL evaluation and planning for discharge  - Provide patient education as appropriate  Outcome: Progressing  Goal: Maintain or return mobility status to optimal level  Description  INTERVENTIONS:  - Assess patient's baseline mobility status (ambulation, transfers, stairs, etc )    - Identify cognitive and physical deficits and behaviors that affect mobility  - Identify mobility aids required to assist with transfers and/or ambulation (gait belt, sit-to-stand, lift, walker, cane, etc )  - [place] fall precautions as indicated by assessment  - Record patient progress and toleration of activity level on Mobility SBAR; progress patient to next Phase/Stage  - Instruct patient to call for assistance with activity based on assessment  - Consider rehabilitation consult to assist with strengthening/weightbearing, etc   Outcome: Progressing     Problem: DISCHARGE PLANNING  Goal: Discharge to home or other facility with appropriate resources  Description  INTERVENTIONS:  - Identify barriers to discharge w/patient and caregiver  - Arrange for needed discharge resources and transportation as appropriate  - Identify discharge learning needs (meds, wound care, etc )  - Arrange for interpretive services to assist at discharge as needed  - Refer to Case Management Department for coordinating discharge planning if the patient needs post-hospital services based on physician/advanced practitioner order or complex needs related to functional status, cognitive ability, or social support system  Outcome: Progressing     Problem: Knowledge Deficit  Goal: Patient/family/caregiver demonstrates understanding of disease process, treatment plan, medications, and discharge instructions  Description  Complete learning assessment and assess knowledge base    Interventions:  - Provide teaching at level of understanding  - Provide teaching via preferred learning methods  Outcome: Progressing     Problem: RESPIRATORY - ADULT  Goal: Achieves optimal ventilation and oxygenation  Description  INTERVENTIONS:  - Assess for changes in respiratory status  - Assess for changes in mentation and behavior  - Position to facilitate oxygenation and minimize respiratory effort  - Oxygen administered by appropriate delivery if ordered  - Initiate smoking cessation education as indicated  - Encourage broncho-pulmonary hygiene including cough, deep breathe, Incentive Spirometry  - Assess the need for suctioning and aspirate as needed  - Assess and instruct to report SOB or any respiratory difficulty  - Respiratory Therapy support as indicated  Outcome: Progressing

## 2019-09-18 NOTE — ASSESSMENT & PLAN NOTE
· Unclear etiology  · This can be secondary to possible PE versus viral bronchitis with bronchospasm as the patient is a lifelong smoker  Mildly elevated BNP  Chest x-ray shows no infiltrates or pulmonary vascular congestions  · V/Q scan is pending  · Check echocardiogram  · Will start the patient on low-dose diuretic  · Start Solu-Medrol, nebulizer treatment, respiratory protocol  · Oxygen supplement to keep pulse ox greater than 92%  · Check procalcitonin  · Will hold off on starting antibiotics at this time as the patient is afebrile and no leukocytosis

## 2019-09-18 NOTE — ASSESSMENT & PLAN NOTE
· Baseline creatinine appears to be 1 4- 1 7 mg/dL   · Will continue to monitor renal function closely   · If worsen will consult nephrology

## 2019-09-18 NOTE — ASSESSMENT & PLAN NOTE
Lab Results   Component Value Date    HGBA1C 7 8 (H) 07/08/2019       Recent Labs     09/18/19  1351   POCGLU 221*       Blood Sugar Average: Last 72 hrs:  · Hyperglycemia slightly worse because of steroids  · Continue Accu-Cheks AC and HS with sliding scale coverage

## 2019-09-18 NOTE — ASSESSMENT & PLAN NOTE
· Smoking cessation discussed  · Patient is not ready to quit smoking at this time  · Will use nicotine patch while in house

## 2019-09-18 NOTE — ASSESSMENT & PLAN NOTE
· The patient with history of DVT in the left lower extremity approximately 50 years ago following her pregnancy with PE  At that time she was treated with anticoagulation  · Venous duplex shows evidence of acute versus subacute nonocclusive DVT of proximal femoral vein and popliteal vein  There are areas of nonocclusive superficial thrombophlebitis noted in the great saphenous vein from mid calf to upper thigh 3 7 cm from saphenofemoral junction    · Will start the patient on a renally adjusted dose of Eliquis, DC full-dose Lovenox

## 2019-09-18 NOTE — ASSESSMENT & PLAN NOTE
· Unclear etiology - patient feels a little bit better, however off of the oxygen she still only 86% on room air    · Patient does not carry a formal diagnosis of COPD, however has been a lifelong smoker  · Possibly secondary to a COPD exacerbation set off by an acute bronchitis  · PE was ruled out by a V/Q scan that was low probability, CTA could not be performed because of her renal insufficiency  · Her initial elevated BNP was noted, an echocardiogram yielded an ejection fraction of about 65% with no regional wall motion abnormalities  · Will give a 1 time dose of Lasix and re-evaluate  · Continue IV steroids - methylprednisolone 40 mg IV q 8 hours  · Will order an exercise desat study

## 2019-09-19 ENCOUNTER — APPOINTMENT (INPATIENT)
Dept: NON INVASIVE DIAGNOSTICS | Facility: HOSPITAL | Age: 74
DRG: 189 | End: 2019-09-19
Payer: COMMERCIAL

## 2019-09-19 LAB
ALBUMIN SERPL BCP-MCNC: 3.2 G/DL (ref 3.5–5.7)
ALP SERPL-CCNC: 55 U/L (ref 55–165)
ALT SERPL W P-5'-P-CCNC: 8 U/L (ref 7–52)
ANION GAP SERPL CALCULATED.3IONS-SCNC: 8 MMOL/L (ref 4–13)
AST SERPL W P-5'-P-CCNC: 18 U/L (ref 13–39)
BASOPHILS # BLD AUTO: 0 THOUSANDS/ΜL (ref 0–0.1)
BASOPHILS NFR BLD AUTO: 0 % (ref 0–2)
BILIRUB SERPL-MCNC: 0.5 MG/DL (ref 0.2–1)
BUN SERPL-MCNC: 28 MG/DL (ref 7–25)
CALCIUM SERPL-MCNC: 8.4 MG/DL (ref 8.6–10.5)
CHLORIDE SERPL-SCNC: 104 MMOL/L (ref 98–107)
CO2 SERPL-SCNC: 23 MMOL/L (ref 21–31)
CREAT SERPL-MCNC: 1.3 MG/DL (ref 0.6–1.2)
EOSINOPHIL # BLD AUTO: 0 THOUSAND/ΜL (ref 0–0.61)
EOSINOPHIL NFR BLD AUTO: 0 % (ref 0–5)
ERYTHROCYTE [DISTWIDTH] IN BLOOD BY AUTOMATED COUNT: 14.5 % (ref 11.5–14.5)
GFR SERPL CREATININE-BSD FRML MDRD: 40 ML/MIN/1.73SQ M
GLUCOSE SERPL-MCNC: 154 MG/DL (ref 65–140)
GLUCOSE SERPL-MCNC: 175 MG/DL (ref 65–99)
GLUCOSE SERPL-MCNC: 196 MG/DL (ref 65–140)
GLUCOSE SERPL-MCNC: 216 MG/DL (ref 65–140)
GLUCOSE SERPL-MCNC: 235 MG/DL (ref 65–140)
HCT VFR BLD AUTO: 39.9 % (ref 42–47)
HGB BLD-MCNC: 13.6 G/DL (ref 12–16)
LYMPHOCYTES # BLD AUTO: 1.3 THOUSANDS/ΜL (ref 0.6–4.47)
LYMPHOCYTES NFR BLD AUTO: 12 % (ref 21–51)
MAGNESIUM SERPL-MCNC: 2 MG/DL (ref 1.9–2.7)
MCH RBC QN AUTO: 30.1 PG (ref 26–34)
MCHC RBC AUTO-ENTMCNC: 34 G/DL (ref 31–37)
MCV RBC AUTO: 89 FL (ref 81–99)
MONOCYTES # BLD AUTO: 1.5 THOUSAND/ΜL (ref 0.17–1.22)
MONOCYTES NFR BLD AUTO: 13 % (ref 2–12)
NEUTROPHILS # BLD AUTO: 8.5 THOUSANDS/ΜL (ref 1.4–6.5)
NEUTS SEG NFR BLD AUTO: 75 % (ref 42–75)
PHOSPHATE SERPL-MCNC: 1.9 MG/DL (ref 3–5.5)
PLATELET # BLD AUTO: 206 THOUSANDS/UL (ref 149–390)
PMV BLD AUTO: 8.1 FL (ref 8.6–11.7)
POTASSIUM SERPL-SCNC: 4.1 MMOL/L (ref 3.5–5.5)
PROCALCITONIN SERPL-MCNC: 0.11 NG/ML
PROT SERPL-MCNC: 6.4 G/DL (ref 6.4–8.9)
RBC # BLD AUTO: 4.5 MILLION/UL (ref 3.9–5.2)
SODIUM SERPL-SCNC: 135 MMOL/L (ref 134–143)
TSH SERPL DL<=0.05 MIU/L-ACNC: 0.43 UIU/ML (ref 0.45–5.33)
WBC # BLD AUTO: 11.3 THOUSAND/UL (ref 4.8–10.8)

## 2019-09-19 PROCEDURE — 84100 ASSAY OF PHOSPHORUS: CPT | Performed by: NURSE PRACTITIONER

## 2019-09-19 PROCEDURE — G8979 MOBILITY GOAL STATUS: HCPCS

## 2019-09-19 PROCEDURE — 84145 PROCALCITONIN (PCT): CPT | Performed by: NURSE PRACTITIONER

## 2019-09-19 PROCEDURE — G8987 SELF CARE CURRENT STATUS: HCPCS

## 2019-09-19 PROCEDURE — 84443 ASSAY THYROID STIM HORMONE: CPT | Performed by: NURSE PRACTITIONER

## 2019-09-19 PROCEDURE — 82948 REAGENT STRIP/BLOOD GLUCOSE: CPT

## 2019-09-19 PROCEDURE — 99232 SBSQ HOSP IP/OBS MODERATE 35: CPT | Performed by: HOSPITALIST

## 2019-09-19 PROCEDURE — G8988 SELF CARE GOAL STATUS: HCPCS

## 2019-09-19 PROCEDURE — G8978 MOBILITY CURRENT STATUS: HCPCS

## 2019-09-19 PROCEDURE — 94760 N-INVAS EAR/PLS OXIMETRY 1: CPT

## 2019-09-19 PROCEDURE — 85025 COMPLETE CBC W/AUTO DIFF WBC: CPT | Performed by: NURSE PRACTITIONER

## 2019-09-19 PROCEDURE — 80053 COMPREHEN METABOLIC PANEL: CPT | Performed by: NURSE PRACTITIONER

## 2019-09-19 PROCEDURE — G8989 SELF CARE D/C STATUS: HCPCS

## 2019-09-19 PROCEDURE — 93306 TTE W/DOPPLER COMPLETE: CPT

## 2019-09-19 PROCEDURE — 94761 N-INVAS EAR/PLS OXIMETRY MLT: CPT

## 2019-09-19 PROCEDURE — 97165 OT EVAL LOW COMPLEX 30 MIN: CPT

## 2019-09-19 PROCEDURE — 97162 PT EVAL MOD COMPLEX 30 MIN: CPT

## 2019-09-19 PROCEDURE — 83735 ASSAY OF MAGNESIUM: CPT | Performed by: NURSE PRACTITIONER

## 2019-09-19 PROCEDURE — G8980 MOBILITY D/C STATUS: HCPCS

## 2019-09-19 PROCEDURE — 93306 TTE W/DOPPLER COMPLETE: CPT | Performed by: INTERNAL MEDICINE

## 2019-09-19 RX ORDER — GUAIFENESIN 100 MG/5ML
200 SOLUTION ORAL EVERY 4 HOURS PRN
Status: DISCONTINUED | OUTPATIENT
Start: 2019-09-19 | End: 2019-09-22 | Stop reason: HOSPADM

## 2019-09-19 RX ORDER — FUROSEMIDE 10 MG/ML
40 INJECTION INTRAMUSCULAR; INTRAVENOUS ONCE
Status: COMPLETED | OUTPATIENT
Start: 2019-09-19 | End: 2019-09-19

## 2019-09-19 RX ADMIN — GUAIFENESIN 200 MG: 100 SOLUTION ORAL at 17:24

## 2019-09-19 RX ADMIN — INSULIN LISPRO 3 UNITS: 100 INJECTION, SOLUTION INTRAVENOUS; SUBCUTANEOUS at 17:24

## 2019-09-19 RX ADMIN — AMLODIPINE BESYLATE 5 MG: 5 TABLET ORAL at 08:43

## 2019-09-19 RX ADMIN — INSULIN LISPRO 2 UNITS: 100 INJECTION, SOLUTION INTRAVENOUS; SUBCUTANEOUS at 22:27

## 2019-09-19 RX ADMIN — CALCIUM CARBONATE-VITAMIN D TAB 500 MG-200 UNIT 1 TABLET: 500-200 TAB at 08:42

## 2019-09-19 RX ADMIN — PAROXETINE 40 MG: 20 TABLET, FILM COATED ORAL at 22:20

## 2019-09-19 RX ADMIN — ASPIRIN 81 MG: 81 TABLET, COATED ORAL at 08:42

## 2019-09-19 RX ADMIN — ATORVASTATIN CALCIUM 80 MG: 80 TABLET, FILM COATED ORAL at 08:42

## 2019-09-19 RX ADMIN — METHYLPREDNISOLONE SODIUM SUCCINATE 40 MG: 40 INJECTION, POWDER, FOR SOLUTION INTRAMUSCULAR; INTRAVENOUS at 14:47

## 2019-09-19 RX ADMIN — FAMOTIDINE 10 MG: 20 TABLET ORAL at 08:42

## 2019-09-19 RX ADMIN — INSULIN LISPRO 2 UNITS: 100 INJECTION, SOLUTION INTRAVENOUS; SUBCUTANEOUS at 12:48

## 2019-09-19 RX ADMIN — APIXABAN 5 MG: 5 TABLET, FILM COATED ORAL at 17:24

## 2019-09-19 RX ADMIN — NICOTINE 1 PATCH: 21 PATCH, EXTENDED RELEASE TRANSDERMAL at 08:44

## 2019-09-19 RX ADMIN — ATENOLOL 100 MG: 25 TABLET ORAL at 08:42

## 2019-09-19 RX ADMIN — INSULIN LISPRO 1 UNITS: 100 INJECTION, SOLUTION INTRAVENOUS; SUBCUTANEOUS at 08:43

## 2019-09-19 RX ADMIN — ACETAMINOPHEN 650 MG: 325 TABLET ORAL at 09:02

## 2019-09-19 RX ADMIN — PANTOPRAZOLE SODIUM 40 MG: 40 TABLET, DELAYED RELEASE ORAL at 06:17

## 2019-09-19 RX ADMIN — ENOXAPARIN SODIUM 80 MG: 100 INJECTION SUBCUTANEOUS at 12:49

## 2019-09-19 RX ADMIN — FUROSEMIDE 40 MG: 10 INJECTION, SOLUTION INTRAMUSCULAR; INTRAVENOUS at 17:24

## 2019-09-19 RX ADMIN — METHYLPREDNISOLONE SODIUM SUCCINATE 40 MG: 40 INJECTION, POWDER, FOR SOLUTION INTRAMUSCULAR; INTRAVENOUS at 22:21

## 2019-09-19 RX ADMIN — FAMOTIDINE 10 MG: 20 TABLET ORAL at 17:24

## 2019-09-19 RX ADMIN — METHYLPREDNISOLONE SODIUM SUCCINATE 40 MG: 40 INJECTION, POWDER, FOR SOLUTION INTRAMUSCULAR; INTRAVENOUS at 06:17

## 2019-09-19 NOTE — NURSING NOTE
Patient resting comfortably in bed  At request of Dr Brown Meter removed oxygen and checked patient on room air  After 1 minute SaO2 86% on room air  Denies feeling short of breath  Dr Orlando Peant aware of same

## 2019-09-19 NOTE — PROGRESS NOTES
Progress Note Michael Prosper 1945, 66 y o  female MRN: [id number]    Unit/Bed#: -02 Encounter: [id number]    Primary Care Provider: Keeley Osborn MD (Inactive)   Date and time admitted to hospital: 9/18/2019  9:08 AM        * Acute respiratory failure with hypoxia ([address] )  Assessment & Plan  · Unclear etiology - patient feels a little bit better, however off of the oxygen she still only 86% on room air  · Patient does not carry a formal diagnosis of COPD, however has been a lifelong smoker  · Possibly secondary to a COPD exacerbation set off by an acute bronchitis  · PE was ruled out by a V/Q scan that was low probability, CTA could not be performed because of her renal insufficiency  · Her initial elevated BNP was noted, an echocardiogram yielded an ejection fraction of about 65% with no regional wall motion abnormalities  · Will give a 1 time dose of Lasix and re-evaluate  · Continue IV steroids - methylprednisolone 40 mg IV q 8 hours  · Will order an exercise desat study    DVT (deep venous thrombosis) (HCC)  Assessment & Plan  · The patient with history of DVT in the left lower extremity approximately 50 years ago following her pregnancy with PE  At that time she was treated with anticoagulation  · Venous duplex shows evidence of acute versus subacute nonocclusive DVT of proximal femoral vein and popliteal vein  There are areas of nonocclusive superficial thrombophlebitis noted in the great saphenous vein from mid calf to upper thigh 3 7 cm from saphenofemoral junction    · Will start the patient on a renally adjusted dose of Eliquis, DC full-dose Lovenox    Type 2 diabetes mellitus without complication, without long-term current use of insulin [facility])  Assessment & Plan  Lab Results   Component Value Date    HGBA1C 7 8 (H) 07/08/2019       Recent Labs     09/18/19  1351   POCGLU 221*       Blood Sugar Average: Last 72 hrs:  · Hyperglycemia slightly worse because of steroids  · Continue Accu-Cheks AC and HS with sliding scale coverage    Tobacco abuse  Assessment & Plan  · Smoking cessation discussed  · Patient is not ready to quit smoking at this time  · Will use nicotine patch while in house    Stage 3 chronic kidney disease ([facility] [address] )  Assessment & Plan  · Baseline creatinine appears to be 1 4- 1 7 mg/dL   · Creatinine is stable        VTE Pharmacologic Prophylaxis: Pharmacologic: Apixaban (Eliquis)    Patient Centered Rounds: I have performed bedside rounds with nursing staff today  Discussions with Specialists or Other Care Team Provider:  Case management, nursing and pharmacy  Education and Discussions with Family / Patient:  Patient was brought up to par with the plan of care for today, all questions answered to her satisfaction    Current Length of Stay: 1 day(s)    Current Patient Status: Inpatient   Certification Statement: The patient will continue to require additional inpatient hospital stay due to Persistent hypoxia and the need for IV steroids    Discharge Plan:  Hopeful discharge planning in 24-48 hours    Code Status: Level 1 - Full Code    Subjective:   Patient seen and examined  Feels better than when she came into the hospital, no new complaints  Is wanting to know when she can go home  Denies any pain or discomfort    Objective:     Vitals:   Temp (24hrs), Av 2 °F (36 8 °C), Min:98 °F (36 7 °C), Max:98 6 °F (37 °C)    Temp:  [98 °F (36 7 °C)-98 6 °F (37 °C)] 98 °F (36 7 °C)  HR:  [77-86] 77  Resp:  [18-22] 22  BP: (119-129)/(58-90) 120/88  SpO2:  [86 %-92 %] 86 %  Body mass index is 30 23 kg/m²  Input and Output Summary (last 24 hours): Intake/Output Summary (Last 24 hours) at 2019 2878  Last data filed at 2019 1300  Gross per 24 hour   Intake 480 ml   Output --   Net 480 ml       Physical Exam:     Physical Exam   Constitutional: She is oriented to person, place, and time  She appears well-developed and well-nourished  HENT:   Head: Normocephalic and atraumatic  Nose: Nose normal    Mouth/Throat: Oropharynx is clear and moist    Eyes: Pupils are equal, round, and reactive to light  Conjunctivae and EOM are normal    Neck: Normal range of motion  Neck supple  No JVD present  No thyromegaly present  Cardiovascular: Normal rate, regular rhythm and intact distal pulses  Exam reveals no gallop and no friction rub  No murmur heard  Pulmonary/Chest: Effort normal and breath sounds normal  No respiratory distress  Increased respiratory effort  Decreased breath sounds bilaterally at the bases  Scattered rhonchi appreciated in all fields bilaterally   Abdominal: Soft  Bowel sounds are normal  She exhibits no distension and no mass  There is no tenderness  There is no guarding  Musculoskeletal: Normal range of motion  She exhibits no edema  Lymphadenopathy:     She has no cervical adenopathy  Neurological: She is alert and oriented to person, place, and time  No cranial nerve deficit  Skin: Skin is warm  No rash noted  No erythema  Psychiatric: She has a normal mood and affect  Her behavior is normal    Vitals reviewed  Additional Data:     Labs:    Results from last 7 days   Lab Units 09/19/19  0518   WBC Thousand/uL 11 30*   HEMOGLOBIN g/dL 13 6   HEMATOCRIT % 39 9*   PLATELETS Thousands/uL 206   NEUTROS PCT % 75   LYMPHS PCT % 12*   MONOS PCT % 13*   EOS PCT % 0     Results from last 7 days   Lab Units 09/19/19  0518   POTASSIUM mmol/L 4 1   CHLORIDE mmol/L 104   CO2 mmol/L 23   BUN mg/dL 28*   CREATININE mg/dL 1 30*   CALCIUM mg/dL 8 4*   ALK PHOS U/L 55   ALT U/L 8   AST U/L 18     Results from last 7 days   Lab Units 09/18/19  1132   INR  1 08     Results from last 7 days   Lab Units 09/19/19  1128 09/19/19  0617 09/18/19  2015 09/18/19  1553 09/18/19  1351   POC GLUCOSE mg/dl 216* 154* 230* 231* 221*           * I Have Reviewed All Lab Data Listed Above  * Additional Pertinent Lab Tests Reviewed:  [address] Admission Reviewed    Imaging:  Imaging Reports Reviewed Today Include:  V/Q scan -  low probability for PE    Recent Cultures (last 7 days):           Last 24 Hours Medication List:     Current Facility-Administered Medications:  acetaminophen 650 mg Oral Q6H PRN Redell Neer, CRNP   albuterol 2 5 mg Nebulization Q4H PRN Redell Neer, CRNP   amLODIPine 5 mg Oral Daily Redell Neer, CRNP   apixaban 5 mg Oral BID Chaparro Glover MD   aspirin 81 mg Oral Daily Redell Neer, CRNP   atenolol 100 mg Oral Daily Redell Neer, CRNP   atorvastatin 80 mg Oral Daily Redell Neer, CRNP   calcium carbonate-vitamin D 1 tablet Oral Daily With Breakfast Redell Neer, CRNP   famotidine 10 mg Oral BID Redell Neer, CRNP   influenza vaccine 0 5 mL Intramuscular Once Isaac Aleman MD   insulin lispro 1-6 Units Subcutaneous TID AC Suzan Paredes, MARYAN   insulin lispro 1-6 Units Subcutaneous HS Redell Neer, CRNP   methylPREDNISolone sodium succinate 40 mg Intravenous Q8H [address] Redell Neer, CRNP   nicotine 1 patch Transdermal Daily Redell Neer, CRNP   ondansetron 4 mg Intravenous Q6H PRN Redell Neer, CRNP   pantoprazole 40 mg Oral Early Morning Redell Neer, CRNP   PARoxetine 40 mg Oral HS Sheila Haider PA-C   pneumococcal 23-valent polysaccharide vaccine 0 5 mL Subcutaneous Prior to discharge Isaac Aleman MD        Today, Patient Was Seen By: Chaparro Glover MD    ** Please Note: Dictation voice to text software may have been used in the creation of this document   **

## 2019-09-19 NOTE — SOCIAL WORK
Visit with patient in her hospital room to initiate discharge planning  Patient will return home with family on discharge  Does not presently have home O2 and will need reassessment for same  She lives with her daughter Abigail Ryan in a 1 story home with 2 steps to enter  Ambulates independently without dme need  She uses [facility] in [place] and denies problem obtaining her meds  Follows with pcp Dr Mynor Camacho  CM reviewed d/c planning process including the following: identifying help at home, patient preference for d/c planning needs, availability of treatment team to discuss questions or concerns patient and/or family may have regarding understanding medications and recognizing signs and symptoms once discharged  CM also encouraged patient to follow up with all recommended appointments after discharge  Patient advised of importance for patient and family to participate in managing patients medical well being

## 2019-09-19 NOTE — OCCUPATIONAL THERAPY NOTE
Occupational Therapy Evaluation      Selene Cava    9/19/2019    Patient Active Problem List   Diagnosis    Acute respiratory failure with hypoxia (HCC)    DVT (deep venous thrombosis) (HCC)    Tobacco abuse    Stage 3 chronic kidney disease ([facility] [address] )    Type 2 diabetes mellitus without complication, without long-term current use of insulin ([facility] [address] )       Past Medical History:   Diagnosis Date    Diabetes mellitus ([facility] [address] )     Hypertension         09/19/19 0927   Note Type   Note type Eval only   Restrictions/Precautions   Weight Bearing Precautions Per Order No   Other Precautions O2;Fall Risk  (2 L O2 via NC)   Pain Assessment   Pain Assessment No/denies pain   Pain Score No Pain   Home Living   Type of [address] [place] [address] One level;Performs ADLs on one level; Able to live on main level with bedroom/bathroom;Stairs to enter with rails  (2 KRISTEN)   Bathroom Shower/Tub Walk-in shower   Bathroom Toilet Standard   Bathroom Equipment   (no DME at baseline )   [address]   (no AD at baseline )   Prior Function   Level of [place] Independent with ADLs and functional mobility   Lives With Daughter   Receives Help From [facility], [facility] in the last 6 months 1 to 4  (1 fall - mechanical fall, tripped over dog)   Vocational Retired   Comments (+) driving    Lifestyle   Autonomy Patient reporting independent with ADLs/IADLs, ambulatory with no AD, and lives with daughter in a one story house with 2 KRISTEN   Reciprocal Relationships family    Service to Others worked at   [employer] 7  Independent   Grooming Assistance 7  [facility] 6  Modified Independent   [address] 6  Modified Independent   [address] 6  Modified independent   [address] 6  Modified independent   [address]  6  Modified independent   Bed Mobility   Supine to Sit 6  Modified independent   Additional items HOB elevated   Sit to Supine 6  Modified independent   Additional items HOB elevated   Transfers   Sit to Stand 7  Independent   Stand to Sit 7  Independent   Functional Mobility   Functional Mobility 7  Independent   Balance   Static Sitting Normal   Dynamic Sitting Normal   Static Standing Good   Dynamic Standing Good   Activity Tolerance   Activity Tolerance Patient tolerated treatment well   RUE Assessment   RUE Assessment WFL   LUE Assessment   LUE Assessment WFL   Hand Function   Gross Motor Coordination Functional   Fine Motor Coordination Functional   Cognition   Overall Cognitive Status WFL   Arousal/Participation Alert; Responsive; Cooperative   Attention Within functional limits   Orientation Level Oriented X4   Memory Within functional limits   Following Commands Follows all commands and directions without difficulty   Assessment   Assessment Pt is a 66 y o  female who was admitted to [address] [place] [address] on 9/18/2019 with Acute respiratory failure with hypoxia ([facility] [address] )  At this time, patient is also affected by the comorbidities of: DM type 2, CKD stage 3, tobacco abuse, h/o DVT, and h/o PE  Additionally, patient is affected by the following personal factors:steps to enter environment  Orders placed for OT evaluation/treatment with up with assistance orders  Prior to admission, Patient reporting independent with ADLs/IADLs, ambulatory with no AD, and lives with daughter in a one story house with 2 KRISTEN  Upon OT evaluation, patient requires modified independent  for UB ADLs and modified independent  for LB ADLs  Patient presents with functional use of BUEs, with intact prehension and fine motor coordination, and symmetrical muscle strength  Patient appears to be functioning at baseline, no further Acute OT needs identified at this time to warrant OT services  D/C OT and from OT standpoint, recommendation at time of d/c would be home with family support     Goals   Patient Goals to go home    Recommendation   OT Discharge Recommendation Home with family support   OT - OK to Discharge Yes  (Once medically cleared)   Barthel Index   Feeding 10   Bathing 5   Grooming Score 5   Dressing Score 10   Bladder Score 10   Bowels Score 10   Toilet Use Score 10   Transfers (Bed/Chair) Score 15   Mobility (Level Surface) Score 15   Stairs Score 0   Barthel Index Score 90     Cat Cotter, OT

## 2019-09-19 NOTE — UTILIZATION REVIEW
Initial Clinical Review    Admission: Date/Time/Statement: Inpatient Admission Orders (From admission, onward)     Ordered        09/18/19 1134  Inpatient Admission (expected length of stay for this patient Order details is greater than two midnights)  Once                   Orders Placed This Encounter   Procedures    Inpatient Admission (expected length of stay for this patient Order details is greater than two midnights)     Standing Status:   Standing     Number of Occurrences:   1     Order Specific Question:   Admitting Physician     Answer:   Tyra Rowell [[id number]]     Order Specific Question:   Level of Care     Answer:   Med Surg [16]     Order Specific Question:   Bed request comments     Answer:   TELEMETRY     Order Specific Question:   Estimated length of stay     Answer:   More than 2 Midnights     Order Specific Question:   Certification     Answer:   I certify that inpatient services are medically necessary for this patient for a duration of greater than two midnights  See H&P and MD Progress Notes for additional information about the patient's course of treatment  ED Arrival Information     Expected Arrival Acuity Means of Arrival Escorted By Service Admission Type    - 9/18/2019 09:03 Emergent Ambulance [facility] WITH [facility] Emergency    Arrival Complaint    shortness of breath        Chief Complaint   Patient presents with    Shortness of Breath     Assessment/Plan: 65 y/o female presents to ED from home by EMS with SOB worsening over past several days, cough productive of white sputum  On EMS arrival, pt wheezing and hypoxic  Given duoneb, IV solumedrol  On ED exam, respiratory distress, wheezes, rhonchi, LLE swelling  Admitted as inpatient due to acute respiratory failure with hypoxia, LLE DVT  Unclear etiology of respiratory failure  VQ scan to r/o PE  Echo  Continue IV solumedrol  Hx LLE DVT approx 50 yrs ago    Doppler now showing acute vs subacute nonocclusive DVT   Start Lovenox      ED Triage Vitals   Temperature Pulse Respirations Blood Pressure SpO2   09/18/19 0923 09/18/19 0923 09/18/19 0923 09/18/19 0923 09/18/19 0903   (!) 97 4 °F (36 3 °C) 78 20 135/70 (!) 71 %      Temp Source Heart Rate Source Patient Position - Orthostatic VS BP Location FiO2 (%)   09/18/19 0923 09/18/19 0923 09/18/19 0923 09/18/19 0923 --   Tympanic Monitor Lying Left arm       Pain Score       09/18/19 0923       No Pain        Wt Readings from Last 1 Encounters:   09/19/19 77 4 kg (170 lb 10 2 oz)     Additional Vital Signs:   09/19/19 0800 98 °F (36 7 °C) 78 22 119/90 92 % Nasal cannula 2L   09/18/19 2241 98 6 °F (37 °C) 82 22 129/58 90 % Nasal cannula 2L   09/18/19 2210 -- 86 18 -- 87 %Abnormal  None (Room air)   09/18/19 1251 98 8 °F (37 1 °C) 80 22 106/62 91 % Nasal cannula 2L       Pertinent Labs/Diagnostic Test Results:   Results from last 7 days   Lab Units 09/19/19  0518 09/18/19  1124   WBC Thousand/uL 11 30* 11 20*   HEMOGLOBIN g/dL 13 6 14 8   HEMATOCRIT % 39 9* 45 3   PLATELETS Thousands/uL 206 203   NEUTROS ABS Thousands/µL 8 50*  --    TOTAL NEUT ABS Thousand/uL  --  10 53*     Results from last 7 days   Lab Units 09/19/19  0518 09/18/19  0944   SODIUM mmol/L 135 135   POTASSIUM mmol/L 4 1 5 3   CHLORIDE mmol/L 104 101   CO2 mmol/L 23 26   ANION GAP mmol/L 8 8   BUN mg/dL 28* 21   CREATININE mg/dL 1 30* 1 48*   EGFR ml/min/1 73sq m 40 34   CALCIUM mg/dL 8 4* 9 3   MAGNESIUM mg/dL 2 0 1 9   PHOSPHORUS mg/dL 1 9*  --      Results from last 7 days   Lab Units 09/19/19  0518 09/18/19  0944   AST U/L 18 18   ALT U/L 8 10   ALK PHOS U/L 55 71   TOTAL PROTEIN g/dL 6 4 7 4   ALBUMIN g/dL 3 2* 3 7   TOTAL BILIRUBIN mg/dL 0 50 1 00     Results from last 7 days   Lab Units 09/19/19  1128 09/19/19  0617 09/18/19  2015 09/18/19  1553 09/18/19  1351   POC GLUCOSE mg/dl 216* 154* 230* 231* 221*     Results from last 7 days   Lab Units 09/19/19  0518 09/18/19  0944   GLUCOSE RANDOM mg/dL 175* 264*     Results from last 7 days   Lab Units 09/18/19  0944   TROPONIN I ng/mL <0 03     Results from last 7 days   Lab Units 09/18/19  1132   PROTIME seconds 12 5   INR  1 08   PTT seconds 27     Results from last 7 days   Lab Units 09/19/19  0518   TSH 3RD GENERATON uIU/mL 0 430*     Results from last 7 days   Lab Units 09/19/19  0518   PROCALCITONIN ng/ml 0 11     Results from last 7 days   Lab Units 09/18/19  0944   BNP pg/mL 262*     9/18 EKG:  Normal sinus rhythm    9/18 CXR:  No acute cardiopulmonary disease  9/18 VQ scan:  The probability for pulmonary embolus is low  9/18 LE venous duplex:  Impression:  RIGHT LOWER LIMB:  No evidence of acute or chronic deep vein thrombosis  No evidence of superficial thrombophlebitis noted  Doppler evaluation shows a normal response to augmentation maneuvers  Popliteal, posterior tibial and anterior tibial arterial Doppler waveforms are  biphasic/monophasic  LEFT LOWER LIMB:  There is evidence of acute vs subacute, non-occlusive deep vein thrombosis of  the proximal femoral vein and popliteal vein  There are areas of non-occlusive superficial thrombophlebitis noted in the  great saphenous vein from mid calf to upper thigh 3 7 cm from the  sapheno-femoral junction  Doppler evaluation shows a normal response to augmentation maneuvers  Popliteal, posterior tibial and anterior tibial arterial Doppler waveforms are  triphasic  9/19 Echo:  LEFT VENTRICLE:  Systolic function was normal  Ejection fraction was estimated to be 65 %  There were no regional wall motion abnormalities  Wall thickness was mildly increased  There was mild concentric hypertrophy    RIGHT VENTRICLE:  Systolic function was normal    MITRAL VALVE:  There was mild annular calcification  There was trace regurgitation    TRICUSPID VALVE:  There was trace regurgitation    PULMONIC VALVE:  There was trace regurgitation   PERICARDIUM:  There was no pericardial effusion      ED Treatment: Medication Administration from 09/18/2019 0903 to 09/18/2019 1247       Date/Time Order Dose Route Action     09/18/2019 0959 ipratropium-albuterol (DUO-NEB) 0 5-2 5 mg/3 mL inhalation solution 3 mL 3 mL Nebulization Given     09/18/2019 0945 ipratropium-albuterol (DUO-NEB) 0 5-2 5 mg/3 mL inhalation solution 3 mL 3 mL Nebulization Given     09/18/2019 1131 enoxaparin (LOVENOX) subcutaneous injection 80 mg 80 mg Subcutaneous Given     09/18/2019 1131 sodium chloride 0 9 % bolus 500 mL 500 mL Intravenous New Bag        Past Medical History:   Diagnosis Date    Diabetes mellitus ([facility] [address] )     Hypertension      Present on Admission:  **None**      Admitting Diagnosis: Shortness of breath [R06 02]  Hypoxemia [R09 02]  Acute respiratory distress [R06 03]  Bronchospasm [J98 01]  DVT of deep femoral vein, left (HCC) [I82 412]  Age/Sex: 66 y o  female  Admission Orders:    Current Facility-Administered Medications:  acetaminophen 650 mg Oral Q6H PRN x1   albuterol 2 5 mg Nebulization Q4H PRN   amLODIPine 5 mg Oral Daily   aspirin 81 mg Oral Daily   atenolol 100 mg Oral Daily   atorvastatin 80 mg Oral Daily   calcium carbonate-vitamin D 1 tablet Oral Daily With Breakfast   enoxaparin 1 mg/kg Subcutaneous Q24H STAS   famotidine 10 mg Oral BID   influenza vaccine 0 5 mL Intramuscular Once   insulin lispro 1-6 Units Subcutaneous TID AC   insulin lispro 1-6 Units Subcutaneous HS   methylPREDNISolone sodium succinate 40 mg Intravenous Q8H [address]   nicotine 1 patch Transdermal Daily   ondansetron 4 mg Intravenous Q6H PRN   pantoprazole 40 mg Oral Early Morning   PARoxetine 40 mg Oral HS   pneumococcal 23-valent polysaccharide vaccine 0 5 mL Subcutaneous Prior to discharge       IP CONSULT TO CASE MANAGEMENT   VS  NC O2  SCDs  PT/OT    Network Utilization Review Department  Phone: [phone]; Fax [phone]  [email or web address]  ATTENTION: Please call with any questions or concerns to [phone]  and carefully listen to the prompts so that you are directed to the right person  Send all requests for admission clinical reviews, approved or denied determinations and any other requests to fax [phone]   All voicemails are confidential

## 2019-09-19 NOTE — RESPIRATORY THERAPY NOTE
Home Oxygen Qualifying Test       Patient name: Suresh Godfrey        : 1945   Date of Test:  2019  Diagnosis:      Home Oxygen Test:    **Medicare Guidelines require item(s) 1-5 on all ambulatory patients or 1 and 2 on non-ambulatory patients  1   Baseline SPO2 on Room Air at rest 88 %  2   SPO2 during exercise on Room Air 85 %  During exercise monitor SpO2  If SPO2 increases >=89% with ambulation do not add supplemental             oxygen  If <= 88% on room air add O2 via NC and titrate patient  Patient must be ambulated with O2 and titrated to > 88% with exertion  3   SPO2 on Oxygen at rest 86 % 1  Lpm        Oxygen increased to 2 liters and Spo2  = 88 %       Oxygen increased to 3 liters and Spo2 = 91 %    4  SPO2 during exercise on Oxygen  91 % a liter flow of 3  lpm     5   Exercise performed:          walking, duration 10 (min), distance 232 (feet)          [x]  Supplemental Home Oxygen is indicated  []  Client does not qualify for home oxygen  Respiratory Additional Notes- Patient tolerated walk well  No Sob or distress noted  RN Elías Bergman and Kelsy Sloan from Case management aware       Vijay Hardin, RT

## 2019-09-20 LAB
ANION GAP SERPL CALCULATED.3IONS-SCNC: 8 MMOL/L (ref 4–13)
BASOPHILS # BLD AUTO: 0 THOUSANDS/ΜL (ref 0–0.1)
BASOPHILS NFR BLD AUTO: 0 % (ref 0–2)
BUN SERPL-MCNC: 41 MG/DL (ref 7–25)
CALCIUM SERPL-MCNC: 8.4 MG/DL (ref 8.6–10.5)
CHLORIDE SERPL-SCNC: 101 MMOL/L (ref 98–107)
CO2 SERPL-SCNC: 29 MMOL/L (ref 21–31)
CREAT SERPL-MCNC: 1.45 MG/DL (ref 0.6–1.2)
EOSINOPHIL # BLD AUTO: 0 THOUSAND/ΜL (ref 0–0.61)
EOSINOPHIL NFR BLD AUTO: 0 % (ref 0–5)
ERYTHROCYTE [DISTWIDTH] IN BLOOD BY AUTOMATED COUNT: 14.5 % (ref 11.5–14.5)
GFR SERPL CREATININE-BSD FRML MDRD: 35 ML/MIN/1.73SQ M
GLUCOSE SERPL-MCNC: 203 MG/DL (ref 65–140)
GLUCOSE SERPL-MCNC: 209 MG/DL (ref 65–140)
GLUCOSE SERPL-MCNC: 225 MG/DL (ref 65–99)
GLUCOSE SERPL-MCNC: 239 MG/DL (ref 65–140)
GLUCOSE SERPL-MCNC: 333 MG/DL (ref 65–140)
HCT VFR BLD AUTO: 44.5 % (ref 42–47)
HGB BLD-MCNC: 14.9 G/DL (ref 12–16)
LYMPHOCYTES # BLD AUTO: 0.9 THOUSANDS/ΜL (ref 0.6–4.47)
LYMPHOCYTES NFR BLD AUTO: 11 % (ref 21–51)
MCH RBC QN AUTO: 29.9 PG (ref 26–34)
MCHC RBC AUTO-ENTMCNC: 33.6 G/DL (ref 31–37)
MCV RBC AUTO: 89 FL (ref 81–99)
MONOCYTES # BLD AUTO: 0.5 THOUSAND/ΜL (ref 0.17–1.22)
MONOCYTES NFR BLD AUTO: 7 % (ref 2–12)
NEUTROPHILS # BLD AUTO: 6.7 THOUSANDS/ΜL (ref 1.4–6.5)
NEUTS SEG NFR BLD AUTO: 83 % (ref 42–75)
PLATELET # BLD AUTO: 227 THOUSANDS/UL (ref 149–390)
PMV BLD AUTO: 8.3 FL (ref 8.6–11.7)
POTASSIUM SERPL-SCNC: 4 MMOL/L (ref 3.5–5.5)
RBC # BLD AUTO: 5 MILLION/UL (ref 3.9–5.2)
SODIUM SERPL-SCNC: 138 MMOL/L (ref 134–143)
WBC # BLD AUTO: 8.1 THOUSAND/UL (ref 4.8–10.8)

## 2019-09-20 PROCEDURE — 94640 AIRWAY INHALATION TREATMENT: CPT

## 2019-09-20 PROCEDURE — 82948 REAGENT STRIP/BLOOD GLUCOSE: CPT

## 2019-09-20 PROCEDURE — 85025 COMPLETE CBC W/AUTO DIFF WBC: CPT | Performed by: HOSPITALIST

## 2019-09-20 PROCEDURE — 94760 N-INVAS EAR/PLS OXIMETRY 1: CPT

## 2019-09-20 PROCEDURE — 99232 SBSQ HOSP IP/OBS MODERATE 35: CPT | Performed by: HOSPITALIST

## 2019-09-20 PROCEDURE — 80048 BASIC METABOLIC PNL TOTAL CA: CPT | Performed by: HOSPITALIST

## 2019-09-20 RX ORDER — METHYLPREDNISOLONE SODIUM SUCCINATE 40 MG/ML
40 INJECTION, POWDER, LYOPHILIZED, FOR SOLUTION INTRAMUSCULAR; INTRAVENOUS EVERY 12 HOURS SCHEDULED
Status: DISCONTINUED | OUTPATIENT
Start: 2019-09-20 | End: 2019-09-21

## 2019-09-20 RX ADMIN — NICOTINE 1 PATCH: 21 PATCH, EXTENDED RELEASE TRANSDERMAL at 08:43

## 2019-09-20 RX ADMIN — ASPIRIN 81 MG: 81 TABLET, COATED ORAL at 08:41

## 2019-09-20 RX ADMIN — ATORVASTATIN CALCIUM 80 MG: 80 TABLET, FILM COATED ORAL at 08:42

## 2019-09-20 RX ADMIN — INSULIN LISPRO 5 UNITS: 100 INJECTION, SOLUTION INTRAVENOUS; SUBCUTANEOUS at 11:39

## 2019-09-20 RX ADMIN — APIXABAN 5 MG: 5 TABLET, FILM COATED ORAL at 17:40

## 2019-09-20 RX ADMIN — METHYLPREDNISOLONE SODIUM SUCCINATE 40 MG: 40 INJECTION, POWDER, FOR SOLUTION INTRAMUSCULAR; INTRAVENOUS at 05:56

## 2019-09-20 RX ADMIN — PAROXETINE 40 MG: 20 TABLET, FILM COATED ORAL at 21:15

## 2019-09-20 RX ADMIN — PANTOPRAZOLE SODIUM 40 MG: 40 TABLET, DELAYED RELEASE ORAL at 05:56

## 2019-09-20 RX ADMIN — INSULIN LISPRO 2 UNITS: 100 INJECTION, SOLUTION INTRAVENOUS; SUBCUTANEOUS at 07:52

## 2019-09-20 RX ADMIN — INSULIN DETEMIR 20 UNITS: 100 INJECTION, SOLUTION SUBCUTANEOUS at 15:54

## 2019-09-20 RX ADMIN — INSULIN LISPRO 3 UNITS: 100 INJECTION, SOLUTION INTRAVENOUS; SUBCUTANEOUS at 21:15

## 2019-09-20 RX ADMIN — ATENOLOL 100 MG: 25 TABLET ORAL at 08:41

## 2019-09-20 RX ADMIN — FAMOTIDINE 10 MG: 20 TABLET ORAL at 08:42

## 2019-09-20 RX ADMIN — ALBUTEROL SULFATE 2.5 MG: 2.5 SOLUTION RESPIRATORY (INHALATION) at 11:09

## 2019-09-20 RX ADMIN — INSULIN LISPRO 2 UNITS: 100 INJECTION, SOLUTION INTRAVENOUS; SUBCUTANEOUS at 16:47

## 2019-09-20 RX ADMIN — METHYLPREDNISOLONE SODIUM SUCCINATE 40 MG: 40 INJECTION, POWDER, FOR SOLUTION INTRAMUSCULAR; INTRAVENOUS at 21:15

## 2019-09-20 RX ADMIN — FAMOTIDINE 10 MG: 20 TABLET ORAL at 17:40

## 2019-09-20 RX ADMIN — AMLODIPINE BESYLATE 5 MG: 5 TABLET ORAL at 08:41

## 2019-09-20 RX ADMIN — APIXABAN 5 MG: 5 TABLET, FILM COATED ORAL at 08:42

## 2019-09-20 RX ADMIN — CALCIUM CARBONATE-VITAMIN D TAB 500 MG-200 UNIT 1 TABLET: 500-200 TAB at 08:42

## 2019-09-20 NOTE — ASSESSMENT & PLAN NOTE
· Unclear etiology - patient feels a little bit better, however off of the oxygen she still only 86% on room air  · Patient does not carry a formal diagnosis of COPD, however has been a lifelong smoker  · Possibly secondary to a COPD exacerbation set off by an acute bronchitis  · PE was ruled out by a V/Q scan that was low probability, CTA could not be performed because of her renal insufficiency  · Her initial elevated BNP was noted, an echocardiogram yielded an ejection fraction of about 65% with no regional wall motion abnormalities  · Patient reports modest improvement over the past 24 hours, she does not feel quite yet ready to go home  · Will modify steroids to b i d  Dosing instead of t i d  Dosing  · Hopeful transition to p o   Prednisone tomorrow after which she can go home

## 2019-09-20 NOTE — NURSING NOTE
Patient's daughter called at this time, concerned states her mom called her and said"coome pick me up I'm discharged  In next breath thought she was at home and was looking for her dogs "  I checked on patient she was sleeping prior to talking to her daughter and was "confused when I woke from a deep sleep "  Alert and oriented x4 at this time  Pleasant and talkative  Recalls her discussion with the doctor and that the plan is to tentatively discharge her tomorrow

## 2019-09-20 NOTE — PLAN OF CARE
Problem: PAIN - ADULT  Goal: Verbalizes/displays adequate comfort level or baseline comfort level  Description  Interventions:  - Encourage patient to monitor pain and request assistance  - Assess pain using appropriate pain scale  - Administer analgesics based on type and severity of pain and evaluate response  - Implement non-pharmacological measures as appropriate and evaluate response  - Consider cultural and social influences on pain and pain management  - Notify physician/advanced practitioner if interventions unsuccessful or patient reports new pain  Outcome: Progressing     Problem: SAFETY ADULT  Goal: Patient will remain free of falls  Description  INTERVENTIONS:  - Assess patient frequently for physical needs  -  Identify cognitive and physical deficits and behaviors that affect risk of falls    -  [place] fall precautions as indicated by assessment   - Educate patient/family on patient safety including physical limitations  - Instruct patient to call for assistance with activity based on assessment  - Modify environment to reduce risk of injury  - Consider OT/PT consult to assist with strengthening/mobility  Outcome: Progressing     Problem: RESPIRATORY - ADULT  Goal: Achieves optimal ventilation and oxygenation  Description  INTERVENTIONS:  - Assess for changes in respiratory status  - Assess for changes in mentation and behavior  - Position to facilitate oxygenation and minimize respiratory effort  - Oxygen administered by appropriate delivery if ordered  - Initiate smoking cessation education as indicated  - Encourage broncho-pulmonary hygiene including cough, deep breathe, Incentive Spirometry  - Assess the need for suctioning and aspirate as needed  - Assess and instruct to report SOB or any respiratory difficulty  - Respiratory Therapy support as indicated  Outcome: Progressing

## 2019-09-20 NOTE — SOCIAL WORK
Discussed at interdisciplinary meeting  Patient will return home on discharge  Meets for home O2 yesterday  Not being discharged today and patient very reluctant to have home O2  Will need reassessment for same

## 2019-09-20 NOTE — ASSESSMENT & PLAN NOTE
· Smoking cessation discussed  · Patient is not ready to quit smoking at this time  · Continue nicotine patch

## 2019-09-20 NOTE — ASSESSMENT & PLAN NOTE
· The patient with history of DVT in the left lower extremity approximately 50 years ago following her pregnancy with PE  At that time she was treated with anticoagulation  · Venous duplex shows evidence of acute versus subacute nonocclusive DVT of proximal femoral vein and popliteal vein  There are areas of nonocclusive superficial thrombophlebitis noted in the great saphenous vein from mid calf to upper thigh 3 7 cm from saphenofemoral junction    · Continue apixaban

## 2019-09-20 NOTE — ASSESSMENT & PLAN NOTE
Lab Results   Component Value Date    HGBA1C 7 8 (H) 07/08/2019       Recent Labs     09/19/19  1628 09/19/19  2030 09/20/19  0606 09/20/19  1106   POCGLU 235* 196* 209* 333*       Blood Sugar Average: Last 72 hrs:  · Hyperglycemia slightly worse because of steroids - will give a 1 time dose of Levemir today  · Continue Accu-Cheks AC and HS with sliding scale coverage

## 2019-09-21 PROBLEM — Z72.0 TOBACCO ABUSE: Chronic | Status: ACTIVE | Noted: 2019-09-18

## 2019-09-21 PROBLEM — J96.01 ACUTE RESPIRATORY FAILURE WITH HYPOXIA (HCC): Chronic | Status: ACTIVE | Noted: 2019-09-18

## 2019-09-21 PROBLEM — N18.30 STAGE 3 CHRONIC KIDNEY DISEASE (HCC): Chronic | Status: ACTIVE | Noted: 2019-09-18

## 2019-09-21 PROBLEM — I82.409 DVT (DEEP VENOUS THROMBOSIS) (HCC): Chronic | Status: ACTIVE | Noted: 2019-09-18

## 2019-09-21 PROBLEM — E11.9 TYPE 2 DIABETES MELLITUS WITHOUT COMPLICATION, WITHOUT LONG-TERM CURRENT USE OF INSULIN (HCC): Chronic | Status: ACTIVE | Noted: 2019-09-18

## 2019-09-21 LAB
ANION GAP SERPL CALCULATED.3IONS-SCNC: 7 MMOL/L (ref 4–13)
BASOPHILS # BLD AUTO: 0 THOUSANDS/ΜL (ref 0–0.1)
BASOPHILS NFR BLD AUTO: 0 % (ref 0–2)
BUN SERPL-MCNC: 40 MG/DL (ref 7–25)
CALCIUM SERPL-MCNC: 8 MG/DL (ref 8.6–10.5)
CHLORIDE SERPL-SCNC: 102 MMOL/L (ref 98–107)
CO2 SERPL-SCNC: 28 MMOL/L (ref 21–31)
CREAT SERPL-MCNC: 1.26 MG/DL (ref 0.6–1.2)
EOSINOPHIL # BLD AUTO: 0 THOUSAND/ΜL (ref 0–0.61)
EOSINOPHIL NFR BLD AUTO: 0 % (ref 0–5)
ERYTHROCYTE [DISTWIDTH] IN BLOOD BY AUTOMATED COUNT: 14 % (ref 11.5–14.5)
GFR SERPL CREATININE-BSD FRML MDRD: 41 ML/MIN/1.73SQ M
GLUCOSE SERPL-MCNC: 215 MG/DL (ref 65–140)
GLUCOSE SERPL-MCNC: 227 MG/DL (ref 65–140)
GLUCOSE SERPL-MCNC: 254 MG/DL (ref 65–99)
GLUCOSE SERPL-MCNC: 276 MG/DL (ref 65–140)
GLUCOSE SERPL-MCNC: 288 MG/DL (ref 65–140)
HCT VFR BLD AUTO: 42.8 % (ref 42–47)
HGB BLD-MCNC: 14.4 G/DL (ref 12–16)
LYMPHOCYTES # BLD AUTO: 0.8 THOUSANDS/ΜL (ref 0.6–4.47)
LYMPHOCYTES NFR BLD AUTO: 12 % (ref 21–51)
MCH RBC QN AUTO: 29.9 PG (ref 26–34)
MCHC RBC AUTO-ENTMCNC: 33.6 G/DL (ref 31–37)
MCV RBC AUTO: 89 FL (ref 81–99)
MONOCYTES # BLD AUTO: 0.6 THOUSAND/ΜL (ref 0.17–1.22)
MONOCYTES NFR BLD AUTO: 8 % (ref 2–12)
NEUTROPHILS # BLD AUTO: 5.6 THOUSANDS/ΜL (ref 1.4–6.5)
NEUTS SEG NFR BLD AUTO: 80 % (ref 42–75)
PLATELET # BLD AUTO: 247 THOUSANDS/UL (ref 149–390)
PMV BLD AUTO: 8.1 FL (ref 8.6–11.7)
POTASSIUM SERPL-SCNC: 4.2 MMOL/L (ref 3.5–5.5)
RBC # BLD AUTO: 4.8 MILLION/UL (ref 3.9–5.2)
SODIUM SERPL-SCNC: 137 MMOL/L (ref 134–143)
WBC # BLD AUTO: 7 THOUSAND/UL (ref 4.8–10.8)

## 2019-09-21 PROCEDURE — 94760 N-INVAS EAR/PLS OXIMETRY 1: CPT

## 2019-09-21 PROCEDURE — 99233 SBSQ HOSP IP/OBS HIGH 50: CPT | Performed by: NURSE PRACTITIONER

## 2019-09-21 PROCEDURE — 85025 COMPLETE CBC W/AUTO DIFF WBC: CPT | Performed by: HOSPITALIST

## 2019-09-21 PROCEDURE — 80048 BASIC METABOLIC PNL TOTAL CA: CPT | Performed by: HOSPITALIST

## 2019-09-21 PROCEDURE — 82948 REAGENT STRIP/BLOOD GLUCOSE: CPT

## 2019-09-21 RX ORDER — PREDNISONE 20 MG/1
40 TABLET ORAL 2 TIMES DAILY WITH MEALS
Status: DISCONTINUED | OUTPATIENT
Start: 2019-09-21 | End: 2019-09-22 | Stop reason: HOSPADM

## 2019-09-21 RX ADMIN — PANTOPRAZOLE SODIUM 40 MG: 40 TABLET, DELAYED RELEASE ORAL at 05:48

## 2019-09-21 RX ADMIN — PREDNISONE 40 MG: 20 TABLET ORAL at 17:37

## 2019-09-21 RX ADMIN — ASPIRIN 81 MG: 81 TABLET, COATED ORAL at 08:11

## 2019-09-21 RX ADMIN — AMLODIPINE BESYLATE 5 MG: 5 TABLET ORAL at 08:11

## 2019-09-21 RX ADMIN — APIXABAN 5 MG: 5 TABLET, FILM COATED ORAL at 08:11

## 2019-09-21 RX ADMIN — INSULIN LISPRO 2 UNITS: 100 INJECTION, SOLUTION INTRAVENOUS; SUBCUTANEOUS at 17:36

## 2019-09-21 RX ADMIN — APIXABAN 5 MG: 5 TABLET, FILM COATED ORAL at 17:37

## 2019-09-21 RX ADMIN — NICOTINE 1 PATCH: 21 PATCH, EXTENDED RELEASE TRANSDERMAL at 08:12

## 2019-09-21 RX ADMIN — INSULIN LISPRO 4 UNITS: 100 INJECTION, SOLUTION INTRAVENOUS; SUBCUTANEOUS at 13:39

## 2019-09-21 RX ADMIN — METHYLPREDNISOLONE SODIUM SUCCINATE 40 MG: 40 INJECTION, POWDER, FOR SOLUTION INTRAMUSCULAR; INTRAVENOUS at 08:12

## 2019-09-21 RX ADMIN — FAMOTIDINE 10 MG: 20 TABLET ORAL at 08:11

## 2019-09-21 RX ADMIN — PAROXETINE 40 MG: 20 TABLET, FILM COATED ORAL at 22:41

## 2019-09-21 RX ADMIN — ATORVASTATIN CALCIUM 80 MG: 80 TABLET, FILM COATED ORAL at 08:11

## 2019-09-21 RX ADMIN — FAMOTIDINE 10 MG: 20 TABLET ORAL at 17:37

## 2019-09-21 RX ADMIN — ATENOLOL 100 MG: 25 TABLET ORAL at 08:11

## 2019-09-21 RX ADMIN — INSULIN LISPRO 2 UNITS: 100 INJECTION, SOLUTION INTRAVENOUS; SUBCUTANEOUS at 08:12

## 2019-09-21 RX ADMIN — CALCIUM CARBONATE-VITAMIN D TAB 500 MG-200 UNIT 1 TABLET: 500-200 TAB at 08:10

## 2019-09-21 RX ADMIN — INSULIN LISPRO 4 UNITS: 100 INJECTION, SOLUTION INTRAVENOUS; SUBCUTANEOUS at 22:41

## 2019-09-21 NOTE — PLAN OF CARE
Problem: PAIN - ADULT  Goal: Verbalizes/displays adequate comfort level or baseline comfort level  Description  Interventions:  - Encourage patient to monitor pain and request assistance  - Assess pain using appropriate pain scale  - Administer analgesics based on type and severity of pain and evaluate response  - Implement non-pharmacological measures as appropriate and evaluate response  - Consider cultural and social influences on pain and pain management  - Notify physician/advanced practitioner if interventions unsuccessful or patient reports new pain  Outcome: Progressing     Problem: INFECTION - ADULT  Goal: Absence or prevention of progression during hospitalization  Description  INTERVENTIONS:  - Assess and monitor for signs and symptoms of infection  - Monitor lab/diagnostic results  - Monitor all insertion sites, i e  indwelling lines, tubes, and drains  - Monitor endotracheal if appropriate and nasal secretions for changes in amount and color  - [place] appropriate cooling/warming therapies per order  - Administer medications as ordered  - Instruct and encourage patient and family to use good hand hygiene technique  - Identify and instruct in appropriate isolation precautions for identified infection/condition  Outcome: Progressing     Problem: SAFETY ADULT  Goal: Patient will remain free of falls  Description  INTERVENTIONS:  - Assess patient frequently for physical needs  -  Identify cognitive and physical deficits and behaviors that affect risk of falls    -  [place] fall precautions as indicated by assessment   - Educate patient/family on patient safety including physical limitations  - Instruct patient to call for assistance with activity based on assessment  - Modify environment to reduce risk of injury  - Consider OT/PT consult to assist with strengthening/mobility  Outcome: Progressing  Goal: Maintain or return to baseline ADL function  Description  INTERVENTIONS:  -  Assess patient's ability to carry out ADLs; assess patient's baseline for ADL function and identify physical deficits which impact ability to perform ADLs (bathing, care of mouth/teeth, toileting, grooming, dressing, etc )  - Assess/evaluate cause of self-care deficits   - Assess range of motion  - Assess patient's mobility; develop plan if impaired  - Assess patient's need for assistive devices and provide as appropriate  - Encourage maximum independence but intervene and supervise when necessary  - Involve family in performance of ADLs  - Assess for home care needs following discharge   - Consider OT consult to assist with ADL evaluation and planning for discharge  - Provide patient education as appropriate  Outcome: Progressing  Goal: Maintain or return mobility status to optimal level  Description  INTERVENTIONS:  - Assess patient's baseline mobility status (ambulation, transfers, stairs, etc )    - Identify cognitive and physical deficits and behaviors that affect mobility  - Identify mobility aids required to assist with transfers and/or ambulation (gait belt, sit-to-stand, lift, walker, cane, etc )  - [place] fall precautions as indicated by assessment  - Record patient progress and toleration of activity level on Mobility SBAR; progress patient to next Phase/Stage  - Instruct patient to call for assistance with activity based on assessment  - Consider rehabilitation consult to assist with strengthening/weightbearing, etc   Outcome: Progressing     Problem: DISCHARGE PLANNING  Goal: Discharge to home or other facility with appropriate resources  Description  INTERVENTIONS:  - Identify barriers to discharge w/patient and caregiver  - Arrange for needed discharge resources and transportation as appropriate  - Identify discharge learning needs (meds, wound care, etc )  - Arrange for interpretive services to assist at discharge as needed  - Refer to Case Management Department for coordinating discharge planning if the patient needs post-hospital services based on physician/advanced practitioner order or complex needs related to functional status, cognitive ability, or social support system  Outcome: Progressing     Problem: Knowledge Deficit  Goal: Patient/family/caregiver demonstrates understanding of disease process, treatment plan, medications, and discharge instructions  Description  Complete learning assessment and assess knowledge base    Interventions:  - Provide teaching at level of understanding  - Provide teaching via preferred learning methods  Outcome: Progressing     Problem: RESPIRATORY - ADULT  Goal: Achieves optimal ventilation and oxygenation  Description  INTERVENTIONS:  - Assess for changes in respiratory status  - Assess for changes in mentation and behavior  - Position to facilitate oxygenation and minimize respiratory effort  - Oxygen administered by appropriate delivery if ordered  - Initiate smoking cessation education as indicated  - Encourage broncho-pulmonary hygiene including cough, deep breathe, Incentive Spirometry  - Assess the need for suctioning and aspirate as needed  - Assess and instruct to report SOB or any respiratory difficulty  - Respiratory Therapy support as indicated  Outcome: Progressing     Problem: DISCHARGE PLANNING - CARE MANAGEMENT  Goal: Discharge to post-acute care or home with appropriate resources  Description  INTERVENTIONS:  - Conduct assessment to determine patient/family and health care team treatment goals, and need for post-acute services based on payer coverage, community resources, and patient preferences, and barriers to discharge  - Address psychosocial, clinical, and financial barriers to discharge as identified in assessment in conjunction with the patient/family and health care team  - Arrange appropriate level of post-acute services according to patient's   needs and preference and payer coverage in collaboration with the physician and health care team  - Communicate with and update the patient/family, physician, and health care team regarding progress on the discharge plan  - Arrange appropriate transportation to post-acute venues    Home with daughter no needs   Outcome: Progressing     Problem: Potential for Falls  Goal: Patient will remain free of falls  Description  INTERVENTIONS:  - Assess patient frequently for physical needs  -  Identify cognitive and physical deficits and behaviors that affect risk of falls    -  [place] fall precautions as indicated by assessment   - Educate patient/family on patient safety including physical limitations  - Instruct patient to call for assistance with activity based on assessment  - Modify environment to reduce risk of injury  - Consider OT/PT consult to assist with strengthening/mobility  Outcome: Progressing

## 2019-09-21 NOTE — SOCIAL WORK
Chart reviewed by case management , pt does qualify for home oxygen , [facility] sent to [facility] and it was received, pt would now like [facility] since she is new to oxygen , a list of [facility]  preferenc by  insurance was given, pt's choice was [facility], referral was made, I called Martir Lópezpe @17:10 to make her aware of the d/c for tomorrow, she was instructed to stay at home to receive the delivery of the oxygen and then when the oxygen has been delivered she should come to transport the pt home, reps dept is aware that the pt will need a portable oxygen tank delivered to the pt's room for the transport home,, pt and daughter are aware of the $8 50 copay for the eliquis and the pharmacy will close at 5 pm

## 2019-09-21 NOTE — ASSESSMENT & PLAN NOTE
· Smoking cessation discussed  · Patient is not ready to quit smoking at this time  · Patient states she has tried E cigarettes but did not like it    · Continue nicotine patch

## 2019-09-21 NOTE — PLAN OF CARE
Problem: DISCHARGE PLANNING - CARE MANAGEMENT  Goal: Discharge to post-acute care or home with appropriate resources  Description  INTERVENTIONS:  - Conduct assessment to determine patient/family and health care team treatment goals, and need for post-acute services based on payer coverage, community resources, and patient preferences, and barriers to discharge  - Address psychosocial, clinical, and financial barriers to discharge as identified in assessment in conjunction with the patient/family and health care team  - Arrange appropriate level of post-acute services according to patient's   needs and preference and payer coverage in collaboration with the physician and health care team  - Communicate with and update the patient/family, physician, and health care team regarding progress on the discharge plan  - Arrange appropriate transportation to post-acute venues    Home with daughter and home lauren and jamia   Outcome: Completed

## 2019-09-21 NOTE — ASSESSMENT & PLAN NOTE
Lab Results   Component Value Date    HGBA1C 7 8 (H) 07/08/2019       Recent Labs     09/20/19  1106 09/20/19  1546 09/20/19 2053 09/21/19  0654   POCGLU 333* 203* 239* 215*       Blood Sugar Average: Last 72 hrs:  · Hyperglycemia slightly worse because of steroids - will give a 1 time dose of Levemir today  · Continue Accu-Cheks AC and HS with sliding scale coverage

## 2019-09-21 NOTE — ASSESSMENT & PLAN NOTE
· Unclear etiology - patient feels a little bit better, however off of the oxygen she still only 86% on room air  · Patient does not carry a formal diagnosis of COPD, however has been a lifelong smoker - since the age of 11  · Possibly secondary to a COPD exacerbation set off by an acute bronchitis  · PE was ruled out by a V/Q scan that was low probability, CTA could not be performed because of her renal insufficiency  · Her initial elevated BNP was noted, an echocardiogram yielded an ejection fraction of about 65% with no regional wall motion abnormalities  · Patient reports modest improvement over the past 24 hours, she does not feel quite yet ready to go home  · Will modify steroids to b i d  Dosing instead of t i d  Dosing  · transition to p o  Prednisone tomorrow after which she can go home  · 09/19/2019 Respiratory performed exercise desat study:  Room air sat 85%, at rest 88%, oxygen applied 1 L = 86%, 2 L = 88%, 3 L = 91%

## 2019-09-21 NOTE — PROGRESS NOTES
Progress Note Calli Wiggins 1945, 66 y o  female MRN: [id number]    Unit/Bed#: -01 Encounter: [id number]    Primary Care Provider: Debra Guerrero MD (Inactive)   Date and time admitted to hospital: 9/18/2019  9:08 AM        * Acute respiratory failure with hypoxia ([address] )  Assessment & Plan  · Unclear etiology - patient feels a little bit better, however off of the oxygen she still only 86% on room air  · Patient does not carry a formal diagnosis of COPD, however has been a lifelong smoker - since the age of 11  · Possibly secondary to a COPD exacerbation set off by an acute bronchitis  · PE was ruled out by a V/Q scan that was low probability, CTA could not be performed because of her renal insufficiency  · Her initial elevated BNP was noted, an echocardiogram yielded an ejection fraction of about 65% with no regional wall motion abnormalities  · Patient reports modest improvement over the past 24 hours, she does not feel quite yet ready to go home  · Will modify steroids to b i d  Dosing instead of t i d  Dosing  · transition to p o  Prednisone tomorrow after which she can go home  · 09/19/2019 Respiratory performed exercise desat study:  Room air sat 85%, at rest 88%, oxygen applied 1 L = 86%, 2 L = 88%, 3 L = 91%      Type 2 diabetes mellitus without complication, without long-term current use of insulin [facility])  Assessment & Plan  Lab Results   Component Value Date    HGBA1C 7 8 (H) 07/08/2019       Recent Labs     09/20/19  1106 09/20/19  1546 09/20/19  2053 09/21/19  0654   POCGLU 333* 203* 239* 215*       Blood Sugar Average: Last 72 hrs:  · Hyperglycemia slightly worse because of steroids - will give a 1 time dose of Levemir today  · Continue Accu-Cheks AC and HS with sliding scale coverage    Stage 3 chronic kidney disease (HCC)  Assessment & Plan  · Baseline creatinine appears to be 1 4- 1 7 mg/dL   · Creatinine is stable    Tobacco abuse  Assessment & Plan  · Smoking cessation discussed  · Patient is not ready to quit smoking at this time  · Patient states she has tried E cigarettes but did not like it  · Continue nicotine patch    DVT (deep venous thrombosis) (HCC)  Assessment & Plan  · The patient with history of DVT in the left lower extremity approximately 50 years ago following her pregnancy with PE  At that time she was treated with anticoagulation  · Venous duplex shows evidence of acute versus subacute nonocclusive DVT of proximal femoral vein and popliteal vein  There are areas of nonocclusive superficial thrombophlebitis noted in the great saphenous vein from mid calf to upper thigh 3 7 cm from saphenofemoral junction  · Continue apixaban      VTE Pharmacologic Prophylaxis: Pharmacologic: Apixaban (Eliquis)    Patient Centered Rounds: I have performed bedside rounds with nursing staff today  Discussions with Specialists or Other Care Team Provider:  Nursing, Case Management, Respiratory, PT OT  Education and Discussions with Family / Patient:  Discussed oxygen use, tobacco abuse, COPD with patient    Current Length of Stay: 3 day(s)    Current Patient Status: Inpatient   Certification Statement: The patient will continue to require additional inpatient hospital stay due to Transition to p o  Steroids, monitoring of oxygen needs  Discharge Plan:  Home with oxygen when appropriate    Code Status: Level 1 - Full Code    Subjective:   Patient is sitting at the side of the bed, she is on her 3 L nasal cannula, sitting and reading the newspaper  She states that she does feel improved but she has quick shortness of breath come on her when she tries to do any type of activity  Objective:     Vitals:   Temp (24hrs), Av 2 °F (36 8 °C), Min:98 °F (36 7 °C), Max:98 4 °F (36 9 °C)    Temp:  [98 °F (36 7 °C)-98 4 °F (36 9 °C)] 98 3 °F (36 8 °C)  HR:  [67-68] 68  Resp:  [18-22] 18  BP: (139-142)/(67-78) 139/67  SpO2:  [93 %-97 %] 97 %  Body mass index is 29 48 kg/m²  Input and Output Summary (last 24 hours): Intake/Output Summary (Last 24 hours) at 9/21/2019 0935  Last data filed at 9/20/2019 1201  Gross per 24 hour   Intake 240 ml   Output --   Net 240 ml       Physical Exam:     Physical Exam   Constitutional: She is oriented to person, place, and time  She appears well-developed and well-nourished  She is cooperative  HENT:   Head: Normocephalic and atraumatic  Nose: Nose normal    Mouth/Throat: Oropharynx is clear and moist and mucous membranes are normal    Eyes: Conjunctivae and EOM are normal    Neck: Normal range of motion and full passive range of motion without pain  Cardiovascular: Normal rate, regular rhythm, normal heart sounds and normal pulses  Pulmonary/Chest: Effort normal  She has rhonchi in the right middle field, the right lower field, the left middle field and the left lower field  Occasional moist cough with thick white sputum  Patient will require oxygen at home  Abdominal: Soft  Normal appearance and bowel sounds are normal    Musculoskeletal: Normal range of motion  Neurological: She is alert and oriented to person, place, and time  Skin: Skin is warm and dry  Psychiatric: She has a normal mood and affect  Her speech is normal and behavior is normal        Additional Data:     Labs:    Results from last 7 days   Lab Units 09/21/19  0441   WBC Thousand/uL 7 00   HEMOGLOBIN g/dL 14 4   HEMATOCRIT % 42 8   PLATELETS Thousands/uL 247   NEUTROS PCT % 80*   LYMPHS PCT % 12*   MONOS PCT % 8   EOS PCT % 0     Results from last 7 days   Lab Units 09/21/19  0441  09/19/19  0518   POTASSIUM mmol/L 4 2   < > 4 1   CHLORIDE mmol/L 102   < > 104   CO2 mmol/L 28   < > 23   BUN mg/dL 40*   < > 28*   CREATININE mg/dL 1 26*   < > 1 30*   CALCIUM mg/dL 8 0*   < > 8 4*   ALK PHOS U/L  --   --  55   ALT U/L  --   --  8   AST U/L  --   --  18    < > = values in this interval not displayed       Results from last 7 days   Lab Units 09/18/19  1132 INR  1 08     Results from last 7 days   Lab Units 09/21/19  0654 09/20/19  2053 09/20/19  1546 09/20/19  1106 09/20/19  0606 09/19/19  2030 09/19/19  1628 09/19/19  1128 09/19/19  0617 09/18/19 2015 09/18/19  1553 09/18/19  1351   POC GLUCOSE mg/dl 215* 239* 203* 333* 209* 196* 235* 216* 154* 230* 231* 221*           * I Have Reviewed All Lab Data Listed Above  * Additional Pertinent Lab Tests Reviewed:  [address] Admission  Reviewed    Imaging:  Imaging Reports Reviewed Today Include:  None    Recent Cultures (last 7 days):           Last 24 Hours Medication List:     Current Facility-Administered Medications:  acetaminophen 650 mg Oral Q6H PRN Jeffery Daniels, MARYAN   albuterol 2 5 mg Nebulization Q4H PRN Jeffery Daniels, MARYAN   amLODIPine 5 mg Oral Daily Jeffery Daniels, MARYAN   apixaban 5 mg Oral BID Jc Suresh MD   aspirin 81 mg Oral Daily Jeffery Daniels, MARYAN   atenolol 100 mg Oral Daily Jeffery Daniels, RAFAELNP   atorvastatin 80 mg Oral Daily Jeffery Daniels, MARYAN   calcium carbonate-vitamin D 1 tablet Oral Daily With Breakfast MARYAN Navarro   famotidine 10 mg Oral BID Jeffery Daniels, MARYAN   guaiFENesin 200 mg Oral Q4H PRN Jeffery Daniels, MARYAN   influenza vaccine 0 5 mL Intramuscular Once Ritchie Mantilla MD   insulin lispro 1-6 Units Subcutaneous TID AC MARYAN Pierce   insulin lispro 1-6 Units Subcutaneous HS MARYAN Navarro   methylPREDNISolone sodium succinate 40 mg Intravenous Q12H 2020 Blanco Ramirez MD   nicotine 1 patch Transdermal Daily MARYAN Navarro   ondansetron 4 mg Intravenous Q6H PRN AMRYAN Navarro   pantoprazole 40 mg Oral Early Morning MARYAN Navarro   PARoxetine 40 mg Oral HS LUDIN Randolph-NOEL   pneumococcal 23-valent polysaccharide vaccine 0 5 mL Subcutaneous Prior to discharge Ritchie Mantilla MD        Today, Patient Was Seen By: MARYAN Serrano    ** Please Note: Dictation voice to text software may have been used in the creation of this document   **

## 2019-09-21 NOTE — NURSING NOTE
Patient resting in bed comfortably throughout the day, with no complaints  CRN in to see patient and plan is for patient to be Dc tomorrow  Pt offers no complaints at this time

## 2019-09-22 VITALS
WEIGHT: 167.55 LBS | RESPIRATION RATE: 18 BRPM | DIASTOLIC BLOOD PRESSURE: 73 MMHG | BODY MASS INDEX: 29.69 KG/M2 | SYSTOLIC BLOOD PRESSURE: 162 MMHG | OXYGEN SATURATION: 90 % | HEIGHT: 63 IN | TEMPERATURE: 97.2 F | HEART RATE: 65 BPM

## 2019-09-22 LAB
ANION GAP SERPL CALCULATED.3IONS-SCNC: 7 MMOL/L (ref 4–13)
BASOPHILS # BLD AUTO: 0 THOUSANDS/ΜL (ref 0–0.1)
BASOPHILS NFR BLD AUTO: 0 % (ref 0–2)
BUN SERPL-MCNC: 32 MG/DL (ref 7–25)
CALCIUM SERPL-MCNC: 8.2 MG/DL (ref 8.6–10.5)
CHLORIDE SERPL-SCNC: 103 MMOL/L (ref 98–107)
CO2 SERPL-SCNC: 27 MMOL/L (ref 21–31)
CREAT SERPL-MCNC: 1.28 MG/DL (ref 0.6–1.2)
EOSINOPHIL # BLD AUTO: 0 THOUSAND/ΜL (ref 0–0.61)
EOSINOPHIL NFR BLD AUTO: 0 % (ref 0–5)
ERYTHROCYTE [DISTWIDTH] IN BLOOD BY AUTOMATED COUNT: 14.1 % (ref 11.5–14.5)
GFR SERPL CREATININE-BSD FRML MDRD: 41 ML/MIN/1.73SQ M
GLUCOSE SERPL-MCNC: 219 MG/DL (ref 65–140)
GLUCOSE SERPL-MCNC: 234 MG/DL (ref 65–99)
HCT VFR BLD AUTO: 42.9 % (ref 42–47)
HGB BLD-MCNC: 14.4 G/DL (ref 12–16)
LYMPHOCYTES # BLD AUTO: 1.2 THOUSANDS/ΜL (ref 0.6–4.47)
LYMPHOCYTES NFR BLD AUTO: 13 % (ref 21–51)
MCH RBC QN AUTO: 30.1 PG (ref 26–34)
MCHC RBC AUTO-ENTMCNC: 33.5 G/DL (ref 31–37)
MCV RBC AUTO: 90 FL (ref 81–99)
MONOCYTES # BLD AUTO: 0.8 THOUSAND/ΜL (ref 0.17–1.22)
MONOCYTES NFR BLD AUTO: 9 % (ref 2–12)
NEUTROPHILS # BLD AUTO: 7.1 THOUSANDS/ΜL (ref 1.4–6.5)
NEUTS SEG NFR BLD AUTO: 78 % (ref 42–75)
PLATELET # BLD AUTO: 260 THOUSANDS/UL (ref 149–390)
PMV BLD AUTO: 7.9 FL (ref 8.6–11.7)
POTASSIUM SERPL-SCNC: 4.5 MMOL/L (ref 3.5–5.5)
RBC # BLD AUTO: 4.78 MILLION/UL (ref 3.9–5.2)
SODIUM SERPL-SCNC: 137 MMOL/L (ref 134–143)
WBC # BLD AUTO: 9.1 THOUSAND/UL (ref 4.8–10.8)

## 2019-09-22 PROCEDURE — 82948 REAGENT STRIP/BLOOD GLUCOSE: CPT

## 2019-09-22 PROCEDURE — 99239 HOSP IP/OBS DSCHRG MGMT >30: CPT | Performed by: NURSE PRACTITIONER

## 2019-09-22 PROCEDURE — 85025 COMPLETE CBC W/AUTO DIFF WBC: CPT | Performed by: NURSE PRACTITIONER

## 2019-09-22 PROCEDURE — 80048 BASIC METABOLIC PNL TOTAL CA: CPT | Performed by: NURSE PRACTITIONER

## 2019-09-22 RX ORDER — NICOTINE 21 MG/24HR
1 PATCH, TRANSDERMAL 24 HOURS TRANSDERMAL DAILY
Qty: 28 PATCH | Refills: 0 | Status: SHIPPED | OUTPATIENT
Start: 2019-09-22 | End: 2019-11-15 | Stop reason: SDDI

## 2019-09-22 RX ORDER — PREDNISONE 20 MG/1
TABLET ORAL
Qty: 32 TABLET | Refills: 0 | Status: SHIPPED | OUTPATIENT
Start: 2019-09-22 | End: 2019-10-15 | Stop reason: ALTCHOICE

## 2019-09-22 RX ORDER — NICOTINE 21 MG/24HR
1 PATCH, TRANSDERMAL 24 HOURS TRANSDERMAL DAILY
Qty: 28 PATCH | Refills: 0 | Status: SHIPPED | OUTPATIENT
Start: 2019-09-23 | End: 2019-10-15 | Stop reason: SDUPTHER

## 2019-09-22 RX ADMIN — FAMOTIDINE 10 MG: 20 TABLET ORAL at 08:18

## 2019-09-22 RX ADMIN — NICOTINE 1 PATCH: 21 PATCH, EXTENDED RELEASE TRANSDERMAL at 08:19

## 2019-09-22 RX ADMIN — PANTOPRAZOLE SODIUM 40 MG: 40 TABLET, DELAYED RELEASE ORAL at 05:13

## 2019-09-22 RX ADMIN — PREDNISONE 40 MG: 20 TABLET ORAL at 08:17

## 2019-09-22 RX ADMIN — INSULIN LISPRO 2 UNITS: 100 INJECTION, SOLUTION INTRAVENOUS; SUBCUTANEOUS at 08:16

## 2019-09-22 RX ADMIN — AMLODIPINE BESYLATE 5 MG: 5 TABLET ORAL at 08:18

## 2019-09-22 RX ADMIN — APIXABAN 5 MG: 5 TABLET, FILM COATED ORAL at 08:17

## 2019-09-22 RX ADMIN — ATORVASTATIN CALCIUM 80 MG: 80 TABLET, FILM COATED ORAL at 08:17

## 2019-09-22 RX ADMIN — CALCIUM CARBONATE-VITAMIN D TAB 500 MG-200 UNIT 1 TABLET: 500-200 TAB at 08:17

## 2019-09-22 RX ADMIN — ATENOLOL 100 MG: 25 TABLET ORAL at 08:18

## 2019-09-22 RX ADMIN — ASPIRIN 81 MG: 81 TABLET, COATED ORAL at 08:18

## 2019-09-22 NOTE — PLAN OF CARE
Problem: PAIN - ADULT  Goal: Verbalizes/displays adequate comfort level or baseline comfort level  Description  Interventions:  - Encourage patient to monitor pain and request assistance  - Assess pain using appropriate pain scale  - Administer analgesics based on type and severity of pain and evaluate response  - Implement non-pharmacological measures as appropriate and evaluate response  - Consider cultural and social influences on pain and pain management  - Notify physician/advanced practitioner if interventions unsuccessful or patient reports new pain  9/22/2019 1044 by Abby Duke RN  Outcome: Adequate for Discharge  9/22/2019 0914 by Abby Duke RN  Outcome: Progressing     Problem: INFECTION - ADULT  Goal: Absence or prevention of progression during hospitalization  Description  INTERVENTIONS:  - Assess and monitor for signs and symptoms of infection  - Monitor lab/diagnostic results  - Monitor all insertion sites, i e  indwelling lines, tubes, and drains  - Monitor endotracheal if appropriate and nasal secretions for changes in amount and color  - [place] appropriate cooling/warming therapies per order  - Administer medications as ordered  - Instruct and encourage patient and family to use good hand hygiene technique  - Identify and instruct in appropriate isolation precautions for identified infection/condition  9/22/2019 1044 by Abby Duke RN  Outcome: Adequate for Discharge  9/22/2019 0914 by Abby Duke RN  Outcome: Progressing     Problem: SAFETY ADULT  Goal: Patient will remain free of falls  Description  INTERVENTIONS:  - Assess patient frequently for physical needs  -  Identify cognitive and physical deficits and behaviors that affect risk of falls    -  [place] fall precautions as indicated by assessment   - Educate patient/family on patient safety including physical limitations  - Instruct patient to call for assistance with activity based on assessment  - Modify environment to reduce risk of injury  - Consider OT/PT consult to assist with strengthening/mobility  9/22/2019 1044 by Anna Ortega RN  Outcome: Adequate for Discharge  9/22/2019 0914 by Anna Ortega RN  Outcome: Progressing  Goal: Maintain or return to baseline ADL function  Description  INTERVENTIONS:  -  Assess patient's ability to carry out ADLs; assess patient's baseline for ADL function and identify physical deficits which impact ability to perform ADLs (bathing, care of mouth/teeth, toileting, grooming, dressing, etc )  - Assess/evaluate cause of self-care deficits   - Assess range of motion  - Assess patient's mobility; develop plan if impaired  - Assess patient's need for assistive devices and provide as appropriate  - Encourage maximum independence but intervene and supervise when necessary  - Involve family in performance of ADLs  - Assess for home care needs following discharge   - Consider OT consult to assist with ADL evaluation and planning for discharge  - Provide patient education as appropriate  9/22/2019 1044 by Anna Ortega RN  Outcome: Adequate for Discharge  9/22/2019 0914 by Anna Ortega RN  Outcome: Progressing  Goal: Maintain or return mobility status to optimal level  Description  INTERVENTIONS:  - Assess patient's baseline mobility status (ambulation, transfers, stairs, etc )    - Identify cognitive and physical deficits and behaviors that affect mobility  - Identify mobility aids required to assist with transfers and/or ambulation (gait belt, sit-to-stand, lift, walker, cane, etc )  - [place] fall precautions as indicated by assessment  - Record patient progress and toleration of activity level on Mobility SBAR; progress patient to next Phase/Stage  - Instruct patient to call for assistance with activity based on assessment  - Consider rehabilitation consult to assist with strengthening/weightbearing, etc   9/22/2019 1044 by Anna Ortega RN  Outcome: Adequate for Discharge  9/22/2019 0914 by Louise Bailey RN  Outcome: Progressing     Problem: DISCHARGE PLANNING  Goal: Discharge to home or other facility with appropriate resources  Description  INTERVENTIONS:  - Identify barriers to discharge w/patient and caregiver  - Arrange for needed discharge resources and transportation as appropriate  - Identify discharge learning needs (meds, wound care, etc )  - Arrange for interpretive services to assist at discharge as needed  - Refer to Case Management Department for coordinating discharge planning if the patient needs post-hospital services based on physician/advanced practitioner order or complex needs related to functional status, cognitive ability, or social support system  9/22/2019 1044 by Louise Bailey RN  Outcome: Adequate for Discharge  9/22/2019 0914 by Louise Bailey RN  Outcome: Progressing     Problem: Knowledge Deficit  Goal: Patient/family/caregiver demonstrates understanding of disease process, treatment plan, medications, and discharge instructions  Description  Complete learning assessment and assess knowledge base    Interventions:  - Provide teaching at level of understanding  - Provide teaching via preferred learning methods  9/22/2019 1044 by Louise Bailey RN  Outcome: Adequate for Discharge  9/22/2019 0914 by Louise Bailey RN  Outcome: Progressing     Problem: RESPIRATORY - ADULT  Goal: Achieves optimal ventilation and oxygenation  Description  INTERVENTIONS:  - Assess for changes in respiratory status  - Assess for changes in mentation and behavior  - Position to facilitate oxygenation and minimize respiratory effort  - Oxygen administered by appropriate delivery if ordered  - Initiate smoking cessation education as indicated  - Encourage broncho-pulmonary hygiene including cough, deep breathe, Incentive Spirometry  - Assess the need for suctioning and aspirate as needed  - Assess and instruct to report SOB or any respiratory difficulty  - Respiratory Therapy support as indicated  9/22/2019 1044 by Kiran Hardin RN  Outcome: Adequate for Discharge  9/22/2019 0914 by Kiran Hardin RN  Outcome: Progressing     Problem: Potential for Falls  Goal: Patient will remain free of falls  Description  INTERVENTIONS:  - Assess patient frequently for physical needs  -  Identify cognitive and physical deficits and behaviors that affect risk of falls    -  [place] fall precautions as indicated by assessment   - Educate patient/family on patient safety including physical limitations  - Instruct patient to call for assistance with activity based on assessment  - Modify environment to reduce risk of injury  - Consider OT/PT consult to assist with strengthening/mobility  9/22/2019 1044 by Kiran Hardin RN  Outcome: Adequate for Discharge  9/22/2019 0914 by Kiran Hardin RN  Outcome: Progressing

## 2019-09-22 NOTE — ASSESSMENT & PLAN NOTE
· Unclear etiology - patient feels a little bit better, however off of the oxygen she still only 86% on room air  · Patient does not carry a formal diagnosis of COPD, however has been a lifelong smoker - since the age of 11  · Possibly secondary to a COPD exacerbation set off by an acute bronchitis  · PE was ruled out by a V/Q scan that was low probability, CTA could not be performed because of her renal insufficiency  · Her initial elevated BNP was noted, an echocardiogram yielded an ejection fraction of about 65% with no regional wall motion abnormalities  · Patient reports modest improvement over the past 24 hours, she does not feel quite yet ready to go home  · Will modify steroids to b i d  Dosing instead of t i d  Dosing  · transition to p o  Prednisone tomorrow after which she can go home  · 09/19/2019 Respiratory performed exercise desat study:  Room air sat 85%, at rest 88%, oxygen applied 1 L = 86%, 2 L = 88%, 3 L = 91%    · Patient set up for home oxygen

## 2019-09-22 NOTE — DISCHARGE SUMMARY
Discharge- Marley Arellano 1945, 66 y o  female MRN: [id number]    Unit/Bed#: -01 Encounter: [id number]    Primary Care Provider: Tyler Muñoz MD (Inactive)   Date and time admitted to hospital: 9/18/2019  9:08 AM        * Acute respiratory failure with hypoxia ([address] )  Assessment & Plan  · Unclear etiology - patient feels a little bit better, however off of the oxygen she still only 86% on room air  · Patient does not carry a formal diagnosis of COPD, however has been a lifelong smoker - since the age of 11  · Possibly secondary to a COPD exacerbation set off by an acute bronchitis  · PE was ruled out by a V/Q scan that was low probability, CTA could not be performed because of her renal insufficiency  · Her initial elevated BNP was noted, an echocardiogram yielded an ejection fraction of about 65% with no regional wall motion abnormalities  · Patient reports modest improvement over the past 24 hours, she does not feel quite yet ready to go home  · Will modify steroids to b i d  Dosing instead of t i d  Dosing  · transition to p o  Prednisone tomorrow after which she can go home  · 09/19/2019 Respiratory performed exercise desat study:  Room air sat 85%, at rest 88%, oxygen applied 1 L = 86%, 2 L = 88%, 3 L = 91%    · Patient set up for home oxygen    Type 2 diabetes mellitus without complication, without long-term current use of insulin [facility])  Assessment & Plan  Lab Results   Component Value Date    HGBA1C 7 8 (H) 07/08/2019       Recent Labs     09/21/19  1108 09/21/19  1606 09/21/19  1959 09/22/19  0600   POCGLU 288* 227* 276* 219*       Blood Sugar Average: Last 72 hrs:  · Hyperglycemia slightly worse because of steroids - will give a 1 time dose of Levemir today  · Continue Accu-Cheks AC and HS with sliding scale coverage    Stage 3 chronic kidney disease (HCC)  Assessment & Plan  · Baseline creatinine appears to be 1 4- 1 7 mg/dL   · Creatinine is stable    Tobacco abuse  Assessment & Plan  · Smoking cessation discussed  · Patient is not ready to quit smoking at this time  · Patient states she has tried E cigarettes but did not like it  · Continue nicotine patch    DVT (deep venous thrombosis) (HCC)  Assessment & Plan  · The patient with history of DVT in the left lower extremity approximately 50 years ago following her pregnancy with PE  At that time she was treated with anticoagulation  · Venous duplex shows evidence of acute versus subacute nonocclusive DVT of proximal femoral vein and popliteal vein  There are areas of nonocclusive superficial thrombophlebitis noted in the great saphenous vein from mid calf to upper thigh 3 7 cm from saphenofemoral junction  · Continue apixaban        Discharging Physician / Practitioner: MARYAN Rodriguez  PCP: Citlaly Ingram MD (Inactive)  Admission Date:   Admission Orders (From admission, onward)     Ordered        09/18/19 1134  Inpatient Admission (expected length of stay for this patient Order details is greater than two midnights)  Once                   Discharge Date: 09/22/19    Resolved Problems  Date Reviewed: 9/22/2019    None          Consultations During Hospital Stay:  · Respiratory therapy    Procedures Performed:   · Chest x-ray:  No acute cardiopulmonary disease  · Bilateral lower extremity venous duplex study:  Right lower limb:  No evidence of acute or chronic DVT  Left lower limb: There is evidence of acute versus subacute, nonocclusive DVT of proximal femoral vein and popliteal vein there are areas of nonocclusive superficial thrombophlebitis noted in the great saphenous vein  · V/Q lung scan:  Low probability for pulmonary embolus  Significant Findings / Test Results:   · Patient will require home oxygen as her room air saturation was 85%    Incidental Findings:   · Patient requires 3 L home oxygen     Test Results Pending at Discharge (will require follow up):    · None     Outpatient Tests Requested:  · None    Complications:  None    Reason for Admission: Shortness of breath     Hospital Course:     Cesario Winn is a 66 y o  female patient who originally presented to the hospital on 9/18/2019 due to shortness of breath  The patient with history of remote DVT following pregnancy, PE and tobacco use  She states that she has been in her normal state of health up until Sunday when she developed coughing with some white sputum production  She states that on Monday she started to develop some dyspnea and wheezing  Today her dyspnea has progressively worsening to the point that she could not exert herself or walk around and subsequently came into the ED for further evaluation  The patient has no prior COPD or asthma history however has been smoking for 60 years  She also has a chronic left lower extremity edema which developed after at DVT following her pregnancy  She denies any left leg pain or any calf tenderness  She does not think that the left lower extremity edema has worsen  In the ED, venous duplex shows an evidence of acute versus subacute nonocclusive DVT of the proximal femoral vein and popliteal vein  The patient is not a candidate for CTA chest due to chronic kidney disease  Patient will require home oxygen at 3 liters nasal canula  She will have a prednisone taper and follow up with her pcp and was given pulmonary phone number to make a follow up appointment  Please see above list of diagnoses and related plan for additional information  Condition at Discharge: fair     Discharge Day Visit / Exam:     Subjective:  Patient is sitting in bed  She states that she feels better and ready to go home  She understands that she needs oxygen at home       Vitals: Blood Pressure: 162/73 (09/22/19 8248)  Pulse: 65 (09/22/19 0632)  Temperature: (!) 97 2 °F (36 2 °C) (09/22/19 0632)  Temp Source: Temporal (09/22/19 3320)  Respirations: 18 (09/22/19 0517)  Height: 5' 3" (160 cm) (09/18/19 1251)  Weight - Scale: 76 kg (167 lb 8 8 oz) (09/22/19 3560)  SpO2: 90 % (09/22/19 4032)  Exam:   Physical Exam   Constitutional: She is oriented to person, place, and time  Vital signs are normal  She appears well-developed and well-nourished  She is cooperative  HENT:   Head: Normocephalic and atraumatic  Nose: Nose normal    Mouth/Throat: Oropharynx is clear and moist and mucous membranes are normal    Eyes: Conjunctivae and EOM are normal    Neck: Normal range of motion and full passive range of motion without pain  Cardiovascular: Normal rate, regular rhythm, normal heart sounds and normal pulses  Pulmonary/Chest: Effort normal  She has rhonchi in the right middle field, the right lower field, the left middle field and the left lower field  3 L nasal cannula   Abdominal: Soft  Normal appearance and bowel sounds are normal    Musculoskeletal: Normal range of motion  Neurological: She is alert and oriented to person, place, and time  Skin: Skin is warm and dry  Psychiatric: She has a normal mood and affect  Her speech is normal and behavior is normal        Discussion with Family: discussed with patient     Discharge instructions/Information to patient and family:   See after visit summary for information provided to patient and family  Provisions for Follow-Up Care:  See after visit summary for information related to follow-up care and any pertinent home health orders  Disposition:     Home with [facility]NA Services (Reminder: Complete face to face encounter)    For Discharges to   [address] SNF:   · Not Applicable to this Patient - Not Applicable to this Patient    Planned Readmission:    no     Discharge Statement:  I spent 40 minutes discharging the patient  This time was spent on the day of discharge  I had direct contact with the patient on the day of discharge   Greater than 50% of the total time was spent examining patient, answering all patient questions, arranging and discussing plan of care with patient as well as directly providing post-discharge instructions  Additional time then spent on discharge activities  Discharge Medications:  See after visit summary for reconciled discharge medications provided to patient and family        ** Please Note: This note has been constructed using a voice recognition system **

## 2019-09-22 NOTE — PLAN OF CARE
Problem: PAIN - ADULT  Goal: Verbalizes/displays adequate comfort level or baseline comfort level  Description  Interventions:  - Encourage patient to monitor pain and request assistance  - Assess pain using appropriate pain scale  - Administer analgesics based on type and severity of pain and evaluate response  - Implement non-pharmacological measures as appropriate and evaluate response  - Consider cultural and social influences on pain and pain management  - Notify physician/advanced practitioner if interventions unsuccessful or patient reports new pain  Outcome: Progressing     Problem: INFECTION - ADULT  Goal: Absence or prevention of progression during hospitalization  Description  INTERVENTIONS:  - Assess and monitor for signs and symptoms of infection  - Monitor lab/diagnostic results  - Monitor all insertion sites, i e  indwelling lines, tubes, and drains  - Monitor endotracheal if appropriate and nasal secretions for changes in amount and color  - [place] appropriate cooling/warming therapies per order  - Administer medications as ordered  - Instruct and encourage patient and family to use good hand hygiene technique  - Identify and instruct in appropriate isolation precautions for identified infection/condition  Outcome: Progressing     Problem: SAFETY ADULT  Goal: Patient will remain free of falls  Description  INTERVENTIONS:  - Assess patient frequently for physical needs  -  Identify cognitive and physical deficits and behaviors that affect risk of falls    -  [place] fall precautions as indicated by assessment   - Educate patient/family on patient safety including physical limitations  - Instruct patient to call for assistance with activity based on assessment  - Modify environment to reduce risk of injury  - Consider OT/PT consult to assist with strengthening/mobility  Outcome: Progressing  Goal: Maintain or return to baseline ADL function  Description  INTERVENTIONS:  -  Assess patient's ability to carry out ADLs; assess patient's baseline for ADL function and identify physical deficits which impact ability to perform ADLs (bathing, care of mouth/teeth, toileting, grooming, dressing, etc )  - Assess/evaluate cause of self-care deficits   - Assess range of motion  - Assess patient's mobility; develop plan if impaired  - Assess patient's need for assistive devices and provide as appropriate  - Encourage maximum independence but intervene and supervise when necessary  - Involve family in performance of ADLs  - Assess for home care needs following discharge   - Consider OT consult to assist with ADL evaluation and planning for discharge  - Provide patient education as appropriate  Outcome: Progressing  Goal: Maintain or return mobility status to optimal level  Description  INTERVENTIONS:  - Assess patient's baseline mobility status (ambulation, transfers, stairs, etc )    - Identify cognitive and physical deficits and behaviors that affect mobility  - Identify mobility aids required to assist with transfers and/or ambulation (gait belt, sit-to-stand, lift, walker, cane, etc )  - [place] fall precautions as indicated by assessment  - Record patient progress and toleration of activity level on Mobility SBAR; progress patient to next Phase/Stage  - Instruct patient to call for assistance with activity based on assessment  - Consider rehabilitation consult to assist with strengthening/weightbearing, etc   Outcome: Progressing     Problem: DISCHARGE PLANNING  Goal: Discharge to home or other facility with appropriate resources  Description  INTERVENTIONS:  - Identify barriers to discharge w/patient and caregiver  - Arrange for needed discharge resources and transportation as appropriate  - Identify discharge learning needs (meds, wound care, etc )  - Arrange for interpretive services to assist at discharge as needed  - Refer to Case Management Department for coordinating discharge planning if the patient needs post-hospital services based on physician/advanced practitioner order or complex needs related to functional status, cognitive ability, or social support system  Outcome: Progressing     Problem: Knowledge Deficit  Goal: Patient/family/caregiver demonstrates understanding of disease process, treatment plan, medications, and discharge instructions  Description  Complete learning assessment and assess knowledge base  Interventions:  - Provide teaching at level of understanding  - Provide teaching via preferred learning methods  Outcome: Progressing     Problem: RESPIRATORY - ADULT  Goal: Achieves optimal ventilation and oxygenation  Description  INTERVENTIONS:  - Assess for changes in respiratory status  - Assess for changes in mentation and behavior  - Position to facilitate oxygenation and minimize respiratory effort  - Oxygen administered by appropriate delivery if ordered  - Initiate smoking cessation education as indicated  - Encourage broncho-pulmonary hygiene including cough, deep breathe, Incentive Spirometry  - Assess the need for suctioning and aspirate as needed  - Assess and instruct to report SOB or any respiratory difficulty  - Respiratory Therapy support as indicated  Outcome: Progressing     Problem: Potential for Falls  Goal: Patient will remain free of falls  Description  INTERVENTIONS:  - Assess patient frequently for physical needs  -  Identify cognitive and physical deficits and behaviors that affect risk of falls    -  [place] fall precautions as indicated by assessment   - Educate patient/family on patient safety including physical limitations  - Instruct patient to call for assistance with activity based on assessment  - Modify environment to reduce risk of injury  - Consider OT/PT consult to assist with strengthening/mobility  Outcome: Progressing

## 2019-09-22 NOTE — NURSING NOTE
Patient to be DC home today, Iv removed, Dc instructions reviewed and given to patient  Patient instructed to continue to take all med's as prescribed and to follow up with all Md appointments, pt verbalized understanding

## 2019-09-22 NOTE — ASSESSMENT & PLAN NOTE
Lab Results   Component Value Date    HGBA1C 7 8 (H) 07/08/2019       Recent Labs     09/21/19  1108 09/21/19  1606 09/21/19  1959 09/22/19  0600   POCGLU 288* 227* 276* 219*       Blood Sugar Average: Last 72 hrs:  · Hyperglycemia slightly worse because of steroids - will give a 1 time dose of Levemir today  · Continue Accu-Cheks AC and HS with sliding scale coverage

## 2019-09-23 NOTE — UTILIZATION REVIEW
Notification of Discharge  This is a Notification of Discharge from our facility [address]  Please be advised that this patient has been discharge from our facility  Below you will find the admission and discharge date and time including the patients disposition  PRESENTATION DATE: 9/18/2019  9:08 AM  OBS ADMISSION DATE:   IP ADMISSION DATE: 9/18/19 1134   DISCHARGE DATE: 9/22/2019 12:22 PM  DIS[facility]ITION[facility]   Admission Orders listed below:  Admission Orders (From admission, onward)     Ordered        09/18/19 1134  Inpatient Admission (expected length of stay for this patient Order details is greater than two midnights)  Once                   Please contact the UR Department if additional information is required to close this patient's authorization/case  [address]  Utilization Review Department  Phone: [phone]; Fax [phone]  [email or web address]  ATTENTION: Please call with any questions or concerns to [phone]  and carefully listen to the prompts so that you are directed to the right person  Send all requests for admission clinical reviews, approved or denied determinations and any other requests to fax [phone]   All voicemails are confidential

## 2019-10-15 ENCOUNTER — OFFICE VISIT (OUTPATIENT)
Dept: PULMONOLOGY | Facility: CLINIC | Age: 74
End: 2019-10-15
Payer: COMMERCIAL

## 2019-10-15 VITALS
WEIGHT: 171 LBS | HEIGHT: 63 IN | DIASTOLIC BLOOD PRESSURE: 82 MMHG | SYSTOLIC BLOOD PRESSURE: 120 MMHG | HEART RATE: 88 BPM | BODY MASS INDEX: 30.3 KG/M2 | OXYGEN SATURATION: 95 %

## 2019-10-15 DIAGNOSIS — J98.01 BRONCHOSPASM: ICD-10-CM

## 2019-10-15 DIAGNOSIS — F17.200 SMOKER: Primary | ICD-10-CM

## 2019-10-15 PROCEDURE — 99204 OFFICE O/P NEW MOD 45 MIN: CPT | Performed by: INTERNAL MEDICINE

## 2019-10-15 RX ORDER — MELATONIN
5000 3 TIMES WEEKLY
COMMUNITY
End: 2022-01-01 | Stop reason: CLARIF

## 2019-10-15 RX ORDER — VARENICLINE TARTRATE 25 MG
KIT ORAL
Qty: 53 TABLET | Refills: 0 | Status: SHIPPED | OUTPATIENT
Start: 2019-10-15 | End: 2022-01-01 | Stop reason: HOSPADM

## 2019-10-15 NOTE — PROGRESS NOTES
Patient:  Sneha Ken   :  1945    MRN:  [id number]    SSN:  [id number]    Date of Service:  10/15/2019    Primary Care Provider:  Sandoval Redmond MD (Inactive)        Sneha Ken is a 66 y o  female who presents along with her daughter for evaluation of shortness of breath  She was hospitalized last month with a DVT in her left lower extremity  A ventilation/perfusion lung scan showed low probability for pulmonary embolism  She has not smoked now for 2 weeks  Past medical history:  She has had bilateral ear surgery, a cholecystectomy, and 2  sections  She does have history of diabetes, hypertension, chronic renal insufficiency, hyperlipidemia  Meds and allergies were reviewed    Socially:  She has been a smoker most of her adult life but has not smoked now for 2 weeks      Family history:  She had 1 stillborn child that she has 2 children who are alive and well    She lived in [facility]    Employment history:  She worked in a [employer] of 1 time    Review of Systems:  Review of [address] [place] [address] were negative other than that stated above        Problem List:  2019: Acute respiratory failure with hypoxia ([facility] )  2019: DVT (deep venous thrombosis) ([facility] )  2019: Tobacco abuse  2019: Stage 3 chronic kidney disease ([email or web address] )  2019: Type 2 diabetes mellitus without complication, without long-  term current use of insulin ([facility])          Physical Exam:  /82 (BP Location: Left arm, Patient Position: Sitting)   Pulse 88   Ht 5' 3" (1 6 m)   Wt 77 6 kg (171 lb)   SpO2 95%   BMI 30 29 kg/m²     In general:  Pleasant elderly lady in no respiratory distress  Oropharynx:  Moist no lesions  Lung fields:  Clear and equal bilaterally no wheezes rales or rhonchi slightly decreased excursion throughout  Heart tones:  Regular no murmur gallop  Abdomen:  Soft and nontender  Mild right ankle edema severe edema of the left calf  Alert and oriented no focal neurologic deficits  Skin warm and dry no obvious rash    Assessment/Plan:  1  Possible COPD:  She has been smoking for 60 years 0 5 packs per day  I am going to order a full pulmonary function and see her back after that  2   Smoking addiction:  It has been 2 weeks now since she had a cigarette but she does want to try Chantix  I gave her prescription for this  3   Chronic lymphedema of the left lower extremity:  I took liberty to refer her to our lymphedema Clinic this time    4  DVT:  I encouraged her to stay on Eliquis indefinitely    5  Diabetes    6  Hypertension    7  Hyperlipidemia    8  Chronic renal insufficiency      The plan was discussed with the patient and/or family      Electronically signed by: Jesse Carlin MD, 10/15/2019 2:47 PM

## 2019-10-28 ENCOUNTER — HOSPITAL ENCOUNTER (OUTPATIENT)
Dept: PULMONOLOGY | Facility: HOSPITAL | Age: 74
Discharge: HOME | End: 2019-10-28
Attending: INTERNAL MEDICINE
Payer: COMMERCIAL

## 2019-10-28 DIAGNOSIS — J98.01 BRONCHOSPASM: ICD-10-CM

## 2019-10-28 PROCEDURE — 94727 GAS DIL/WSHOT DETER LNG VOL: CPT

## 2019-10-28 PROCEDURE — 94726 PLETHYSMOGRAPHY LUNG VOLUMES: CPT | Performed by: INTERNAL MEDICINE

## 2019-10-28 PROCEDURE — 94729 DIFFUSING CAPACITY: CPT | Performed by: INTERNAL MEDICINE

## 2019-10-28 PROCEDURE — 94729 DIFFUSING CAPACITY: CPT

## 2019-10-28 PROCEDURE — 94010 BREATHING CAPACITY TEST: CPT

## 2019-10-28 PROCEDURE — 94010 BREATHING CAPACITY TEST: CPT | Performed by: INTERNAL MEDICINE

## 2019-10-28 PROCEDURE — 94760 N-INVAS EAR/PLS OXIMETRY 1: CPT

## 2019-10-28 RX ORDER — ALBUTEROL SULFATE 2.5 MG/3ML
2.5 SOLUTION RESPIRATORY (INHALATION) ONCE AS NEEDED
Status: DISCONTINUED | OUTPATIENT
Start: 2019-10-28 | End: 2019-11-01 | Stop reason: HOSPADM

## 2019-11-15 ENCOUNTER — OFFICE VISIT (OUTPATIENT)
Dept: PULMONOLOGY | Facility: CLINIC | Age: 74
End: 2019-11-15
Payer: COMMERCIAL

## 2019-11-15 ENCOUNTER — TRANSCRIBE ORDERS (OUTPATIENT)
Dept: LAB | Facility: CLINIC | Age: 74
End: 2019-11-15

## 2019-11-15 ENCOUNTER — APPOINTMENT (OUTPATIENT)
Dept: LAB | Facility: CLINIC | Age: 74
End: 2019-11-15
Payer: COMMERCIAL

## 2019-11-15 VITALS
DIASTOLIC BLOOD PRESSURE: 68 MMHG | HEIGHT: 63 IN | OXYGEN SATURATION: 96 % | WEIGHT: 171 LBS | BODY MASS INDEX: 30.3 KG/M2 | SYSTOLIC BLOOD PRESSURE: 110 MMHG | HEART RATE: 65 BPM

## 2019-11-15 DIAGNOSIS — N25.81 SECONDARY HYPERPARATHYROIDISM OF RENAL ORIGIN (HCC): ICD-10-CM

## 2019-11-15 DIAGNOSIS — E55.9 AVITAMINOSIS D: ICD-10-CM

## 2019-11-15 DIAGNOSIS — E11.22 TYPE 2 DIABETES MELLITUS WITH ESRD (END-STAGE RENAL DISEASE) (HCC): ICD-10-CM

## 2019-11-15 DIAGNOSIS — N18.6 TYPE 2 DIABETES MELLITUS WITH ESRD (END-STAGE RENAL DISEASE) (HCC): ICD-10-CM

## 2019-11-15 DIAGNOSIS — N18.30 CHRONIC KIDNEY DISEASE, STAGE III (MODERATE) (HCC): ICD-10-CM

## 2019-11-15 DIAGNOSIS — I10 ESSENTIAL HYPERTENSION, MALIGNANT: ICD-10-CM

## 2019-11-15 DIAGNOSIS — R09.02 HYPOXIA: Primary | ICD-10-CM

## 2019-11-15 DIAGNOSIS — N18.30 CHRONIC KIDNEY DISEASE, STAGE III (MODERATE) (HCC): Primary | ICD-10-CM

## 2019-11-15 LAB
25(OH)D3 SERPL-MCNC: 35.4 NG/ML (ref 30–100)
ALBUMIN SERPL BCP-MCNC: 3.2 G/DL (ref 3.5–5)
ANION GAP SERPL CALCULATED.3IONS-SCNC: 8 MMOL/L (ref 4–13)
BUN SERPL-MCNC: 26 MG/DL (ref 5–25)
CA-I BLD-SCNC: 1.23 MMOL/L (ref 1.12–1.32)
CALCIUM SERPL-MCNC: 9.5 MG/DL (ref 8.3–10.1)
CHLORIDE SERPL-SCNC: 106 MMOL/L (ref 100–108)
CO2 SERPL-SCNC: 25 MMOL/L (ref 21–32)
CREAT SERPL-MCNC: 1.52 MG/DL (ref 0.6–1.3)
CREAT UR-MCNC: 402 MG/DL
CREAT UR-MCNC: 402 MG/DL
ERYTHROCYTE [DISTWIDTH] IN BLOOD BY AUTOMATED COUNT: 14.3 % (ref 11.6–15.1)
EST. AVERAGE GLUCOSE BLD GHB EST-MCNC: 189 MG/DL
GFR SERPL CREATININE-BSD FRML MDRD: 33 ML/MIN/1.73SQ M
GLUCOSE SERPL-MCNC: 246 MG/DL (ref 65–140)
HBA1C MFR BLD: 8.2 % (ref 4.2–6.3)
HCT VFR BLD AUTO: 44.2 % (ref 34.8–46.1)
HGB BLD-MCNC: 13.8 G/DL (ref 11.5–15.4)
MCH RBC QN AUTO: 29.3 PG (ref 26.8–34.3)
MCHC RBC AUTO-ENTMCNC: 31.2 G/DL (ref 31.4–37.4)
MCV RBC AUTO: 94 FL (ref 82–98)
MICROALBUMIN UR-MCNC: 60.9 MG/L (ref 0–20)
MICROALBUMIN/CREAT 24H UR: 15 MG/G CREATININE (ref 0–30)
PHOSPHATE SERPL-MCNC: 3.5 MG/DL (ref 2.3–4.1)
PLATELET # BLD AUTO: 324 THOUSANDS/UL (ref 149–390)
PMV BLD AUTO: 9.8 FL (ref 8.9–12.7)
POTASSIUM SERPL-SCNC: 3.9 MMOL/L (ref 3.5–5.3)
PROT UR-MCNC: 67 MG/DL
PROT/CREAT UR: 0.17 MG/G{CREAT} (ref 0–0.1)
PTH-INTACT SERPL-MCNC: 189.1 PG/ML (ref 18.4–80.1)
RBC # BLD AUTO: 4.71 MILLION/UL (ref 3.81–5.12)
SODIUM SERPL-SCNC: 139 MMOL/L (ref 136–145)
WBC # BLD AUTO: 8.82 THOUSAND/UL (ref 4.31–10.16)

## 2019-11-15 PROCEDURE — 82570 ASSAY OF URINE CREATININE: CPT

## 2019-11-15 PROCEDURE — 82043 UR ALBUMIN QUANTITATIVE: CPT

## 2019-11-15 PROCEDURE — 85027 COMPLETE CBC AUTOMATED: CPT

## 2019-11-15 PROCEDURE — 36415 COLL VENOUS BLD VENIPUNCTURE: CPT

## 2019-11-15 PROCEDURE — 94618 PULMONARY STRESS TESTING: CPT | Performed by: PHYSICIAN ASSISTANT

## 2019-11-15 PROCEDURE — 80069 RENAL FUNCTION PANEL: CPT

## 2019-11-15 PROCEDURE — 83970 ASSAY OF PARATHORMONE: CPT

## 2019-11-15 PROCEDURE — 82306 VITAMIN D 25 HYDROXY: CPT

## 2019-11-15 PROCEDURE — 84156 ASSAY OF PROTEIN URINE: CPT

## 2019-11-15 PROCEDURE — 83036 HEMOGLOBIN GLYCOSYLATED A1C: CPT

## 2019-11-15 PROCEDURE — 99214 OFFICE O/P EST MOD 30 MIN: CPT | Performed by: PHYSICIAN ASSISTANT

## 2019-11-15 PROCEDURE — 82330 ASSAY OF CALCIUM: CPT

## 2019-11-21 PROBLEM — R09.02 HYPOXIA: Status: ACTIVE | Noted: 2019-11-21

## 2019-11-21 NOTE — ASSESSMENT & PLAN NOTE
6 minutes walk test was performed in the office today  Does not require supplemental oxygen at rest or with activities stable discontinue oxygen now  I also reviewed recent pulmonary function test with patient today  While there are no signs of obstruction I instructed patient that this is subject to change if she continues to smoke

## 2019-11-21 NOTE — ASSESSMENT & PLAN NOTE
Continue Chantix daily  Discussed behavioral modifications as well as to help her get rid of that last cigarette  Patient denies nicotine replacement lozenges or gum at this time

## 2019-11-21 NOTE — PROGRESS NOTES
Pulmonary Follow Up Note   Conor Moore 66 y o  female MRN: [id number]  11/21/2019      Assessment:    Hypoxia  6 minutes walk test was performed in the office today  Does not require supplemental oxygen at rest or with activities stable discontinue oxygen now  I also reviewed recent pulmonary function test with patient today  While there are no signs of obstruction I instructed patient that this is subject to change if she continues to smoke  Tobacco abuse  Continue Chantix daily  Discussed behavioral modifications as well as to help her get rid of that last cigarette  Patient denies nicotine replacement lozenges or gum at this time  DVT (deep venous thrombosis) (HCC)  Continue Eliquis 5 mg b i d   Follow-up PCP  Plan:    Diagnoses and all orders for this visit:    Hypoxia  -     POCT 6 minute walk  -     Discontinue home oxygen        Return if symptoms worsen or fail to improve  History of Present Illness   HPI:  Conor Moore is a 66 y o  female who presents to the office today for 1 month follow up and PFT results  Patient was recently hospitalized for hypoxia  She was found to have DVT but V/Q scan showed low probablity of PE  She is a current smoker and there was concern for COPD  Patient was sent home on home O2 but reports no respirtory complaints at this time  Denies shortness of breath at rest or with activities  No chest pain, pleurisy, lower extremity edema  Denies cough, sputum production, hemoptysis, or bronchospasm  Patient has been using Chantix without any side effects unfortunately she continues to smoke 1 cigarette a day  Previously she smoked half a pack a day for 50 years  Patient does admit to snoring and daytime drowsiness but refuses sleep study  Review of Systems   All other systems reviewed and are negative  Historical Information   Past Medical History:   Diagnosis Date    Diabetes mellitus ([facility] )     Hypertension      History reviewed   No pertinent surgical history  Family History   Problem Relation Age of Onset    No Known Problems Mother        Social History     Tobacco Use   Smoking Status Current Every Day Smoker    Packs/day: 0 50    Years: 61 00    Pack years: 30 50    Types: Cigarettes    Start date: 10/15/1958   Smokeless Tobacco Never Used         Meds/Allergies     Current Outpatient Medications:     amLODIPine (NORVASC) 5 mg tablet, Take 5 mg by mouth daily, Disp: , Rfl: 3    apixaban (ELIQUIS) 5 mg, Take 1 tablet (5 mg total) by mouth 2 (two) times a day, Disp: 60 tablet, Rfl: 0    aspirin (ECOTRIN LOW STRENGTH) 81 mg EC tablet, Take 81 mg by mouth daily, Disp: , Rfl:     atenolol (TENORMIN) 100 mg tablet, Take by mouth, Disp: , Rfl:     atorvastatin (LIPITOR) 80 mg tablet, Take 80 mg by mouth daily, Disp: , Rfl:     cholecalciferol (VITAMIN D3) 1,000 units tablet, Take 5,000 Units by mouth 3 (three) times a week, Disp: , Rfl:     famotidine (PEPCID) 20 mg tablet, Take 20 mg by mouth 2 (two) times a day, Disp: , Rfl:     LINAGLIPTIN PO, Take 5 mg by mouth, Disp: , Rfl:     omeprazole (PriLOSEC) 40 MG capsule, Take 40 mg by mouth daily, Disp: , Rfl:     PARoxetine (PAXIL) 40 MG tablet, Take by mouth, Disp: , Rfl:     repaglinide (PRANDIN) 1 mg tablet, TAKE 1 TAB BY MOUTH THREE TIMES A DAY BEFORE MEALS , Disp: , Rfl: 3    varenicline (CHANTIX CAMRON) 0 5 MG X 11 & 1 MG X 42 tablet, Take one 0 5mg tab by mouth 1x daily for 3 days, then increase to one 0 5mg tab 2x daily for 3 days, then increase to one 1mg tab 2x daily, Disp: 53 tablet, Rfl: 0  No Known Allergies    Vitals: Blood pressure 110/68, pulse 65, height 5' 3" (1 6 m), weight 77 6 kg (171 lb), SpO2 96 %  Body mass index is 30 29 kg/m²  Oxygen Therapy  SpO2: 96 %    Physical Exam  Physical Exam   Constitutional: She is oriented to person, place, and time  She appears well-developed and well-nourished  HENT:   Head: Normocephalic and atraumatic     Right Ear: External ear normal  Left Ear: External ear normal    Mouth/Throat: Oropharynx is clear and moist    Eyes: Pupils are equal, round, and reactive to light  Conjunctivae and EOM are normal  Right eye exhibits no discharge  Left eye exhibits no discharge  Neck: Normal range of motion  Neck supple  No tracheal deviation present  Cardiovascular: Normal rate, regular rhythm, normal heart sounds and intact distal pulses  No murmur heard  Pulmonary/Chest: Effort normal and breath sounds normal  No respiratory distress  She has no wheezes  She has no rales  She exhibits no tenderness  Abdominal: Soft  Musculoskeletal: Normal range of motion  She exhibits no edema, tenderness or deformity  Neurological: She is alert and oriented to person, place, and time  No cranial nerve deficit  Skin: Skin is warm and dry  No erythema  Psychiatric: She has a normal mood and affect  Her behavior is normal  Judgment and thought content normal    Vitals reviewed  Labs: I have personally reviewed pertinent lab results  , ABG: No results found for: PHART, III2USB, PO2ART, TLN4MRF, B0DJBXZC, BEART, SOURCE, BNP: No results found for: BNP, CBC: No results found for: WBC, HGB, HCT, MCV, PLT, ADJUSTEDWBC, MCH, MCHC, RDW, MPV, NRBC, CMP: No results found for: SODIUM, K, CL, CO2, ANIONGAP, BUN, CREATININE, GLUCOSE, CALCIUM, AST, ALT, ALKPHOS, PROT, BILITOT, EGFR, PT/INR: No results found for: PT, INR, Troponin: No results found for: TROPONINI  Lab Results   Component Value Date    WBC 8 82 11/15/2019    HGB 13 8 11/15/2019    HCT 44 2 11/15/2019    MCV 94 11/15/2019     11/15/2019     Lab Results   Component Value Date    GLUCOSE 114 06/28/2014    CALCIUM 9 5 11/15/2019     06/28/2014    K 3 9 11/15/2019    CO2 25 11/15/2019     11/15/2019    BUN 26 (H) 11/15/2019    CREATININE 1 52 (H) 11/15/2019     No results found for: IGE  Lab Results   Component Value Date    ALT 8 09/19/2019    AST 18 09/19/2019    ALKPHOS 55 09/19/2019 BILITOT 0 5 06/28/2014       Imaging and other studies: I have personally reviewed pertinent reports  and I have personally reviewed pertinent films in PACS     Chest x-ray 09/18/2019  No acute infiltrates, pneumothorax, pleural effusion    Pulmonary function testing:  Performed on 10/28/2019  FEV1/FVC ratio 70 %   FEV1 83 % predicted  FVC 82 % predicted  No response to bronchodilators   % predicted   % predicted  DLCO corrected for hemoglobin 77 % predicted  No obstructive airflow defect  Normal spirometry  Normal lung volumes  Normal diffusion capacity  Other Studies: I have personally reviewed pertinent reports  6 minutes walk test performed in the office today  Resting oxygen saturations on room air were 94% with heart rate of 62 beats per minute  Patient was able to ambulate for a total 6 minutes or 380 m without oxygen saturations dropping below 92% with a max heart rate of 79 beats per minute  Impression:  Patient does not require supplemental oxygen at rest or with activities

## 2020-03-03 ENCOUNTER — TRANSCRIBE ORDERS (OUTPATIENT)
Dept: LAB | Facility: CLINIC | Age: 75
End: 2020-03-03

## 2020-03-03 ENCOUNTER — APPOINTMENT (OUTPATIENT)
Dept: LAB | Facility: CLINIC | Age: 75
End: 2020-03-03
Payer: COMMERCIAL

## 2020-03-03 DIAGNOSIS — N18.30 CHRONIC KIDNEY DISEASE, STAGE III (MODERATE) (HCC): ICD-10-CM

## 2020-03-03 DIAGNOSIS — Z17.0 ESTROGEN RECEPTOR POSITIVE: ICD-10-CM

## 2020-03-03 DIAGNOSIS — E11.22 TYPE 2 DIABETES MELLITUS WITH ESRD (END-STAGE RENAL DISEASE) (HCC): ICD-10-CM

## 2020-03-03 DIAGNOSIS — N18.6 TYPE 2 DIABETES MELLITUS WITH ESRD (END-STAGE RENAL DISEASE) (HCC): ICD-10-CM

## 2020-03-03 DIAGNOSIS — C50.511 MALIGNANT NEOPLASM OF LOWER-OUTER QUADRANT OF RIGHT FEMALE BREAST, UNSPECIFIED ESTROGEN RECEPTOR STATUS (HCC): Primary | ICD-10-CM

## 2020-03-03 DIAGNOSIS — C50.511 MALIGNANT NEOPLASM OF LOWER-OUTER QUADRANT OF RIGHT FEMALE BREAST, UNSPECIFIED ESTROGEN RECEPTOR STATUS (HCC): ICD-10-CM

## 2020-03-03 LAB
ALBUMIN SERPL BCP-MCNC: 3.1 G/DL (ref 3.5–5)
ALP SERPL-CCNC: 118 U/L (ref 46–116)
ALT SERPL W P-5'-P-CCNC: 14 U/L (ref 12–78)
ANION GAP SERPL CALCULATED.3IONS-SCNC: 8 MMOL/L (ref 4–13)
AST SERPL W P-5'-P-CCNC: 11 U/L (ref 5–45)
BASOPHILS # BLD AUTO: 0.06 THOUSANDS/ΜL (ref 0–0.1)
BASOPHILS NFR BLD AUTO: 1 % (ref 0–1)
BILIRUB SERPL-MCNC: 0.77 MG/DL (ref 0.2–1)
BUN SERPL-MCNC: 22 MG/DL (ref 5–25)
CALCIUM SERPL-MCNC: 9.7 MG/DL (ref 8.3–10.1)
CHLORIDE SERPL-SCNC: 105 MMOL/L (ref 100–108)
CO2 SERPL-SCNC: 26 MMOL/L (ref 21–32)
CREAT SERPL-MCNC: 1.71 MG/DL (ref 0.6–1.3)
EOSINOPHIL # BLD AUTO: 0.17 THOUSAND/ΜL (ref 0–0.61)
EOSINOPHIL NFR BLD AUTO: 2 % (ref 0–6)
ERYTHROCYTE [DISTWIDTH] IN BLOOD BY AUTOMATED COUNT: 14.1 % (ref 11.6–15.1)
EST. AVERAGE GLUCOSE BLD GHB EST-MCNC: 194 MG/DL
GFR SERPL CREATININE-BSD FRML MDRD: 28 ML/MIN/1.73SQ M
GLUCOSE SERPL-MCNC: 233 MG/DL (ref 65–140)
HBA1C MFR BLD: 8.4 %
HCT VFR BLD AUTO: 46.8 % (ref 34.8–46.1)
HGB BLD-MCNC: 14.7 G/DL (ref 11.5–15.4)
IMM GRANULOCYTES # BLD AUTO: 0.05 THOUSAND/UL (ref 0–0.2)
IMM GRANULOCYTES NFR BLD AUTO: 1 % (ref 0–2)
LYMPHOCYTES # BLD AUTO: 2.36 THOUSANDS/ΜL (ref 0.6–4.47)
LYMPHOCYTES NFR BLD AUTO: 25 % (ref 14–44)
MCH RBC QN AUTO: 28.9 PG (ref 26.8–34.3)
MCHC RBC AUTO-ENTMCNC: 31.4 G/DL (ref 31.4–37.4)
MCV RBC AUTO: 92 FL (ref 82–98)
MONOCYTES # BLD AUTO: 1.01 THOUSAND/ΜL (ref 0.17–1.22)
MONOCYTES NFR BLD AUTO: 11 % (ref 4–12)
NEUTROPHILS # BLD AUTO: 5.64 THOUSANDS/ΜL (ref 1.85–7.62)
NEUTS SEG NFR BLD AUTO: 60 % (ref 43–75)
NRBC BLD AUTO-RTO: 0 /100 WBCS
PLATELET # BLD AUTO: 295 THOUSANDS/UL (ref 149–390)
PMV BLD AUTO: 9.6 FL (ref 8.9–12.7)
POTASSIUM SERPL-SCNC: 4.5 MMOL/L (ref 3.5–5.3)
PROT SERPL-MCNC: 7.6 G/DL (ref 6.4–8.2)
RBC # BLD AUTO: 5.08 MILLION/UL (ref 3.81–5.12)
SODIUM SERPL-SCNC: 139 MMOL/L (ref 136–145)
WBC # BLD AUTO: 9.29 THOUSAND/UL (ref 4.31–10.16)

## 2020-03-03 PROCEDURE — 80053 COMPREHEN METABOLIC PANEL: CPT

## 2020-03-03 PROCEDURE — 83036 HEMOGLOBIN GLYCOSYLATED A1C: CPT

## 2020-03-03 PROCEDURE — 85025 COMPLETE CBC W/AUTO DIFF WBC: CPT

## 2020-03-03 PROCEDURE — 36415 COLL VENOUS BLD VENIPUNCTURE: CPT

## 2020-08-25 ENCOUNTER — TRANSCRIBE ORDERS (OUTPATIENT)
Dept: LAB | Facility: CLINIC | Age: 75
End: 2020-08-25

## 2020-08-25 ENCOUNTER — APPOINTMENT (OUTPATIENT)
Dept: LAB | Facility: CLINIC | Age: 75
End: 2020-08-25
Payer: COMMERCIAL

## 2020-08-25 DIAGNOSIS — E11.22 TYPE 2 DIABETES MELLITUS WITH ESRD (END-STAGE RENAL DISEASE) (HCC): ICD-10-CM

## 2020-08-25 DIAGNOSIS — D51.0 PERNICIOUS ANEMIA: ICD-10-CM

## 2020-08-25 DIAGNOSIS — E55.9 AVITAMINOSIS D: ICD-10-CM

## 2020-08-25 DIAGNOSIS — N18.6 TYPE 2 DIABETES MELLITUS WITH ESRD (END-STAGE RENAL DISEASE) (HCC): Primary | ICD-10-CM

## 2020-08-25 DIAGNOSIS — N18.6 TYPE 2 DIABETES MELLITUS WITH ESRD (END-STAGE RENAL DISEASE) (HCC): ICD-10-CM

## 2020-08-25 DIAGNOSIS — E11.22 TYPE 2 DIABETES MELLITUS WITH ESRD (END-STAGE RENAL DISEASE) (HCC): Primary | ICD-10-CM

## 2020-08-25 DIAGNOSIS — M81.0 SENILE OSTEOPOROSIS: ICD-10-CM

## 2020-08-25 DIAGNOSIS — N18.30 CHRONIC KIDNEY DISEASE, STAGE III (MODERATE) (HCC): ICD-10-CM

## 2020-08-25 LAB
25(OH)D3 SERPL-MCNC: 48 NG/ML (ref 30–100)
ALBUMIN SERPL BCP-MCNC: 3 G/DL (ref 3.5–5)
ALP SERPL-CCNC: 137 U/L (ref 46–116)
ALT SERPL W P-5'-P-CCNC: 26 U/L (ref 12–78)
ANION GAP SERPL CALCULATED.3IONS-SCNC: 9 MMOL/L (ref 4–13)
AST SERPL W P-5'-P-CCNC: 24 U/L (ref 5–45)
BASOPHILS # BLD AUTO: 0.03 THOUSANDS/ΜL (ref 0–0.1)
BASOPHILS NFR BLD AUTO: 0 % (ref 0–1)
BILIRUB SERPL-MCNC: 0.43 MG/DL (ref 0.2–1)
BUN SERPL-MCNC: 28 MG/DL (ref 5–25)
CALCIUM SERPL-MCNC: 9.8 MG/DL (ref 8.3–10.1)
CHLORIDE SERPL-SCNC: 106 MMOL/L (ref 100–108)
CHOLEST SERPL-MCNC: 107 MG/DL (ref 50–200)
CO2 SERPL-SCNC: 23 MMOL/L (ref 21–32)
CREAT SERPL-MCNC: 1.81 MG/DL (ref 0.6–1.3)
CREAT UR-MCNC: 254 MG/DL
EOSINOPHIL # BLD AUTO: 0.09 THOUSAND/ΜL (ref 0–0.61)
EOSINOPHIL NFR BLD AUTO: 1 % (ref 0–6)
ERYTHROCYTE [DISTWIDTH] IN BLOOD BY AUTOMATED COUNT: 14.7 % (ref 11.6–15.1)
EST. AVERAGE GLUCOSE BLD GHB EST-MCNC: 163 MG/DL
GFR SERPL CREATININE-BSD FRML MDRD: 27 ML/MIN/1.73SQ M
GLUCOSE SERPL-MCNC: 310 MG/DL (ref 65–140)
HBA1C MFR BLD: 7.3 %
HCT VFR BLD AUTO: 40.6 % (ref 34.8–46.1)
HDLC SERPL-MCNC: 44 MG/DL
HGB BLD-MCNC: 12.6 G/DL (ref 11.5–15.4)
IMM GRANULOCYTES # BLD AUTO: 0.03 THOUSAND/UL (ref 0–0.2)
IMM GRANULOCYTES NFR BLD AUTO: 0 % (ref 0–2)
LDLC SERPL CALC-MCNC: 45 MG/DL (ref 0–100)
LYMPHOCYTES # BLD AUTO: 2.64 THOUSANDS/ΜL (ref 0.6–4.47)
LYMPHOCYTES NFR BLD AUTO: 29 % (ref 14–44)
MCH RBC QN AUTO: 28.1 PG (ref 26.8–34.3)
MCHC RBC AUTO-ENTMCNC: 31 G/DL (ref 31.4–37.4)
MCV RBC AUTO: 91 FL (ref 82–98)
MICROALBUMIN UR-MCNC: 122 MG/L (ref 0–20)
MICROALBUMIN/CREAT 24H UR: 48 MG/G CREATININE (ref 0–30)
MONOCYTES # BLD AUTO: 0.83 THOUSAND/ΜL (ref 0.17–1.22)
MONOCYTES NFR BLD AUTO: 9 % (ref 4–12)
NEUTROPHILS # BLD AUTO: 5.63 THOUSANDS/ΜL (ref 1.85–7.62)
NEUTS SEG NFR BLD AUTO: 61 % (ref 43–75)
NONHDLC SERPL-MCNC: 63 MG/DL
NRBC BLD AUTO-RTO: 0 /100 WBCS
PLATELET # BLD AUTO: 313 THOUSANDS/UL (ref 149–390)
PMV BLD AUTO: 9.3 FL (ref 8.9–12.7)
POTASSIUM SERPL-SCNC: 3.9 MMOL/L (ref 3.5–5.3)
PROT SERPL-MCNC: 7.7 G/DL (ref 6.4–8.2)
RBC # BLD AUTO: 4.48 MILLION/UL (ref 3.81–5.12)
SODIUM SERPL-SCNC: 138 MMOL/L (ref 136–145)
T4 FREE SERPL-MCNC: 1.14 NG/DL (ref 0.76–1.46)
TRIGL SERPL-MCNC: 90 MG/DL
TSH SERPL DL<=0.05 MIU/L-ACNC: 0.03 UIU/ML (ref 0.36–3.74)
VIT B12 SERPL-MCNC: 302 PG/ML (ref 100–900)
WBC # BLD AUTO: 9.25 THOUSAND/UL (ref 4.31–10.16)

## 2020-08-25 PROCEDURE — 36415 COLL VENOUS BLD VENIPUNCTURE: CPT

## 2020-08-25 PROCEDURE — 80053 COMPREHEN METABOLIC PANEL: CPT

## 2020-08-25 PROCEDURE — 84439 ASSAY OF FREE THYROXINE: CPT

## 2020-08-25 PROCEDURE — 82570 ASSAY OF URINE CREATININE: CPT

## 2020-08-25 PROCEDURE — 82043 UR ALBUMIN QUANTITATIVE: CPT

## 2020-08-25 PROCEDURE — 83036 HEMOGLOBIN GLYCOSYLATED A1C: CPT

## 2020-08-25 PROCEDURE — 82607 VITAMIN B-12: CPT

## 2020-08-25 PROCEDURE — 85025 COMPLETE CBC W/AUTO DIFF WBC: CPT

## 2020-08-25 PROCEDURE — 80061 LIPID PANEL: CPT

## 2020-08-25 PROCEDURE — 84443 ASSAY THYROID STIM HORMONE: CPT

## 2020-08-25 PROCEDURE — 82306 VITAMIN D 25 HYDROXY: CPT

## 2020-11-22 ENCOUNTER — HOSPITAL ENCOUNTER (EMERGENCY)
Facility: HOSPITAL | Age: 75
Discharge: HOME | End: 2020-11-22
Attending: EMERGENCY MEDICINE | Admitting: EMERGENCY MEDICINE
Payer: COMMERCIAL

## 2020-11-22 VITALS
HEART RATE: 93 BPM | WEIGHT: 155 LBS | DIASTOLIC BLOOD PRESSURE: 70 MMHG | BODY MASS INDEX: 27.46 KG/M2 | SYSTOLIC BLOOD PRESSURE: 141 MMHG | TEMPERATURE: 97.5 F | OXYGEN SATURATION: 96 % | RESPIRATION RATE: 18 BRPM | HEIGHT: 63 IN

## 2020-11-22 DIAGNOSIS — K14.8 TONGUE LESION: Primary | ICD-10-CM

## 2020-11-22 DIAGNOSIS — R58 BLEEDING: ICD-10-CM

## 2020-11-22 PROCEDURE — 99283 EMERGENCY DEPT VISIT LOW MDM: CPT

## 2020-11-22 PROCEDURE — 99284 EMERGENCY DEPT VISIT MOD MDM: CPT | Performed by: PHYSICIAN ASSISTANT

## 2020-11-22 PROCEDURE — 17250 CHEM CAUT OF GRANLTJ TISSUE: CPT | Performed by: PHYSICIAN ASSISTANT

## 2020-11-22 RX ORDER — TRANEXAMIC ACID 100 MG/ML
500 INJECTION, SOLUTION INTRAVENOUS ONCE
Status: COMPLETED | OUTPATIENT
Start: 2020-11-22 | End: 2020-11-22

## 2020-11-22 RX ADMIN — TRANEXAMIC ACID 500 MG: 1 INJECTION, SOLUTION INTRAVENOUS at 18:14

## 2020-11-22 RX ADMIN — SILVER NITRATE APPLICATORS 3 APPLICATOR: 25; 75 STICK TOPICAL at 20:23

## 2020-11-23 PROCEDURE — 88305 TISSUE EXAM BY PATHOLOGIST: CPT | Performed by: PATHOLOGY

## 2020-11-24 ENCOUNTER — APPOINTMENT (EMERGENCY)
Dept: CT IMAGING | Facility: HOSPITAL | Age: 75
End: 2020-11-24
Payer: COMMERCIAL

## 2020-11-24 ENCOUNTER — APPOINTMENT (EMERGENCY)
Dept: RADIOLOGY | Facility: HOSPITAL | Age: 75
End: 2020-11-24
Payer: COMMERCIAL

## 2020-11-24 ENCOUNTER — HOSPITAL ENCOUNTER (EMERGENCY)
Facility: HOSPITAL | Age: 75
End: 2020-11-25
Attending: EMERGENCY MEDICINE | Admitting: EMERGENCY MEDICINE
Payer: COMMERCIAL

## 2020-11-24 DIAGNOSIS — G45.9 TIA (TRANSIENT ISCHEMIC ATTACK): Primary | ICD-10-CM

## 2020-11-24 PROBLEM — R91.1 PULMONARY NODULE: Status: ACTIVE | Noted: 2020-11-24

## 2020-11-24 PROBLEM — R29.90 STROKE-LIKE SYMPTOMS: Status: ACTIVE | Noted: 2020-11-24

## 2020-11-24 PROBLEM — I16.0 HYPERTENSIVE URGENCY: Status: ACTIVE | Noted: 2020-11-24

## 2020-11-24 PROBLEM — Z85.3 HISTORY OF BREAST CANCER: Chronic | Status: ACTIVE | Noted: 2020-11-24

## 2020-11-24 PROBLEM — N18.4 CKD (CHRONIC KIDNEY DISEASE) STAGE 4, GFR 15-29 ML/MIN (HCC): Status: ACTIVE | Noted: 2020-11-24

## 2020-11-24 LAB
AMPHETAMINES SERPL QL SCN: NEGATIVE
ANION GAP SERPL CALCULATED.3IONS-SCNC: 7 MMOL/L (ref 4–13)
APTT PPP: 28 SECONDS (ref 23–37)
BARBITURATES UR QL: NEGATIVE
BENZODIAZ UR QL: NEGATIVE
BUN SERPL-MCNC: 30 MG/DL (ref 7–25)
CALCIUM SERPL-MCNC: 9.5 MG/DL (ref 8.6–10.5)
CHLORIDE SERPL-SCNC: 101 MMOL/L (ref 98–107)
CO2 SERPL-SCNC: 29 MMOL/L (ref 21–31)
COCAINE UR QL: NEGATIVE
CREAT SERPL-MCNC: 1.97 MG/DL (ref 0.6–1.2)
ERYTHROCYTE [DISTWIDTH] IN BLOOD BY AUTOMATED COUNT: 14.4 % (ref 11.5–14.5)
ETHANOL SERPL-MCNC: <10 MG/DL
FLUAV RNA RESP QL NAA+PROBE: NEGATIVE
FLUBV RNA RESP QL NAA+PROBE: NEGATIVE
GFR SERPL CREATININE-BSD FRML MDRD: 24 ML/MIN/1.73SQ M
GLUCOSE SERPL-MCNC: 226 MG/DL (ref 65–99)
HCT VFR BLD AUTO: 36.8 % (ref 42–47)
HGB BLD-MCNC: 11.9 G/DL (ref 12–16)
INR PPP: 1.12 (ref 0.84–1.19)
MCH RBC QN AUTO: 27.7 PG (ref 26–34)
MCHC RBC AUTO-ENTMCNC: 32.4 G/DL (ref 31–37)
MCV RBC AUTO: 86 FL (ref 81–99)
METHADONE UR QL: NEGATIVE
OPIATES UR QL SCN: NEGATIVE
OXYCODONE+OXYMORPHONE UR QL SCN: NEGATIVE
PCP UR QL: NEGATIVE
PLATELET # BLD AUTO: 270 THOUSANDS/UL (ref 149–390)
PMV BLD AUTO: 6.9 FL (ref 8.6–11.7)
POTASSIUM SERPL-SCNC: 4 MMOL/L (ref 3.5–5.5)
PROTHROMBIN TIME: 14.3 SECONDS (ref 11.6–14.5)
RBC # BLD AUTO: 4.3 MILLION/UL (ref 3.9–5.2)
RSV RNA RESP QL NAA+PROBE: NEGATIVE
SARS-COV-2 RNA RESP QL NAA+PROBE: NEGATIVE
SODIUM SERPL-SCNC: 137 MMOL/L (ref 134–143)
THC UR QL: NEGATIVE
TROPONIN I SERPL-MCNC: <0.03 NG/ML
WBC # BLD AUTO: 7.7 THOUSAND/UL (ref 4.8–10.8)

## 2020-11-24 PROCEDURE — 99285 EMERGENCY DEPT VISIT HI MDM: CPT

## 2020-11-24 PROCEDURE — 85610 PROTHROMBIN TIME: CPT | Performed by: EMERGENCY MEDICINE

## 2020-11-24 PROCEDURE — 70498 CT ANGIOGRAPHY NECK: CPT

## 2020-11-24 PROCEDURE — G1004 CDSM NDSC: HCPCS

## 2020-11-24 PROCEDURE — 85027 COMPLETE CBC AUTOMATED: CPT | Performed by: EMERGENCY MEDICINE

## 2020-11-24 PROCEDURE — 71045 X-RAY EXAM CHEST 1 VIEW: CPT

## 2020-11-24 PROCEDURE — 70496 CT ANGIOGRAPHY HEAD: CPT

## 2020-11-24 PROCEDURE — 93005 ELECTROCARDIOGRAM TRACING: CPT

## 2020-11-24 PROCEDURE — 80048 BASIC METABOLIC PNL TOTAL CA: CPT | Performed by: EMERGENCY MEDICINE

## 2020-11-24 PROCEDURE — 96361 HYDRATE IV INFUSION ADD-ON: CPT

## 2020-11-24 PROCEDURE — 0241U HB NFCT DS VIR RESP RNA 4 TRGT: CPT | Performed by: EMERGENCY MEDICINE

## 2020-11-24 PROCEDURE — 84484 ASSAY OF TROPONIN QUANT: CPT | Performed by: EMERGENCY MEDICINE

## 2020-11-24 PROCEDURE — 85730 THROMBOPLASTIN TIME PARTIAL: CPT | Performed by: EMERGENCY MEDICINE

## 2020-11-24 PROCEDURE — 99284 EMERGENCY DEPT VISIT MOD MDM: CPT | Performed by: EMERGENCY MEDICINE

## 2020-11-24 PROCEDURE — 80307 DRUG TEST PRSMV CHEM ANLYZR: CPT | Performed by: EMERGENCY MEDICINE

## 2020-11-24 PROCEDURE — 80320 DRUG SCREEN QUANTALCOHOLS: CPT | Performed by: EMERGENCY MEDICINE

## 2020-11-24 PROCEDURE — 36415 COLL VENOUS BLD VENIPUNCTURE: CPT | Performed by: EMERGENCY MEDICINE

## 2020-11-24 RX ORDER — CALCITRIOL 0.25 UG/1
0.25 CAPSULE, LIQUID FILLED ORAL DAILY
COMMUNITY
End: 2022-01-01 | Stop reason: CLARIF

## 2020-11-24 RX ORDER — LISINOPRIL 10 MG/1
10 TABLET ORAL DAILY
COMMUNITY
End: 2020-11-27 | Stop reason: HOSPADM

## 2020-11-24 RX ORDER — ANASTROZOLE 1 MG/1
1 TABLET ORAL DAILY
COMMUNITY
End: 2022-01-01

## 2020-11-24 RX ORDER — ASPIRIN 300 MG/1
300 SUPPOSITORY RECTAL ONCE
Status: COMPLETED | OUTPATIENT
Start: 2020-11-24 | End: 2020-11-24

## 2020-11-24 RX ORDER — ATORVASTATIN CALCIUM 80 MG/1
80 TABLET, FILM COATED ORAL DAILY
COMMUNITY
End: 2022-01-01 | Stop reason: HOSPADM

## 2020-11-24 RX ORDER — UREA 10 %
500 LOTION (ML) TOPICAL DAILY
COMMUNITY
End: 2022-01-01 | Stop reason: CLARIF

## 2020-11-24 RX ORDER — LABETALOL 20 MG/4 ML (5 MG/ML) INTRAVENOUS SYRINGE
10 ONCE
Status: COMPLETED | OUTPATIENT
Start: 2020-11-25 | End: 2020-11-25

## 2020-11-24 RX ADMIN — ASPIRIN 300 MG: 300 SUPPOSITORY RECTAL at 20:42

## 2020-11-24 RX ADMIN — IOHEXOL 85 ML: 350 INJECTION, SOLUTION INTRAVENOUS at 20:10

## 2020-11-24 RX ADMIN — SODIUM CHLORIDE 1000 ML: 0.9 INJECTION, SOLUTION INTRAVENOUS at 20:41

## 2020-11-25 ENCOUNTER — APPOINTMENT (INPATIENT)
Dept: NON INVASIVE DIAGNOSTICS | Facility: HOSPITAL | Age: 75
DRG: 074 | End: 2020-11-25
Payer: COMMERCIAL

## 2020-11-25 ENCOUNTER — APPOINTMENT (INPATIENT)
Dept: MRI IMAGING | Facility: HOSPITAL | Age: 75
DRG: 074 | End: 2020-11-25
Payer: COMMERCIAL

## 2020-11-25 ENCOUNTER — HOSPITAL ENCOUNTER (INPATIENT)
Facility: HOSPITAL | Age: 75
LOS: 2 days | Discharge: HOME HEALTH | DRG: 074 | End: 2020-11-27
Attending: FAMILY MEDICINE | Admitting: FAMILY MEDICINE
Payer: COMMERCIAL

## 2020-11-25 ENCOUNTER — APPOINTMENT (INPATIENT)
Dept: NEUROLOGY | Facility: HOSPITAL | Age: 75
DRG: 074 | End: 2020-11-25
Payer: COMMERCIAL

## 2020-11-25 VITALS
HEART RATE: 82 BPM | OXYGEN SATURATION: 96 % | WEIGHT: 150 LBS | BODY MASS INDEX: 26.58 KG/M2 | HEIGHT: 63 IN | SYSTOLIC BLOOD PRESSURE: 166 MMHG | RESPIRATION RATE: 20 BRPM | DIASTOLIC BLOOD PRESSURE: 106 MMHG | TEMPERATURE: 97.2 F

## 2020-11-25 DIAGNOSIS — R29.90 STROKE-LIKE SYMPTOMS: Primary | ICD-10-CM

## 2020-11-25 DIAGNOSIS — N17.9 AKI (ACUTE KIDNEY INJURY) (HCC): ICD-10-CM

## 2020-11-25 DIAGNOSIS — I10 HTN (HYPERTENSION): ICD-10-CM

## 2020-11-25 PROBLEM — Z86.718 HISTORY OF DVT (DEEP VEIN THROMBOSIS): Chronic | Status: ACTIVE | Noted: 2020-11-25

## 2020-11-25 LAB
ATRIAL RATE: 93 BPM
GLUCOSE SERPL-MCNC: 143 MG/DL (ref 65–140)
GLUCOSE SERPL-MCNC: 171 MG/DL (ref 65–140)
P AXIS: 62 DEGREES
PR INTERVAL: 162 MS
QRS AXIS: 20 DEGREES
QRSD INTERVAL: 82 MS
QT INTERVAL: 364 MS
QTC INTERVAL: 452 MS
T WAVE AXIS: 37 DEGREES
VENTRICULAR RATE: 93 BPM

## 2020-11-25 PROCEDURE — 99223 1ST HOSP IP/OBS HIGH 75: CPT

## 2020-11-25 PROCEDURE — 93306 TTE W/DOPPLER COMPLETE: CPT | Performed by: INTERNAL MEDICINE

## 2020-11-25 PROCEDURE — 93010 ELECTROCARDIOGRAM REPORT: CPT | Performed by: INTERNAL MEDICINE

## 2020-11-25 PROCEDURE — 99223 1ST HOSP IP/OBS HIGH 75: CPT | Performed by: FAMILY MEDICINE

## 2020-11-25 PROCEDURE — 96374 THER/PROPH/DIAG INJ IV PUSH: CPT

## 2020-11-25 PROCEDURE — 99223 1ST HOSP IP/OBS HIGH 75: CPT | Performed by: PSYCHIATRY & NEUROLOGY

## 2020-11-25 PROCEDURE — 97167 OT EVAL HIGH COMPLEX 60 MIN: CPT

## 2020-11-25 PROCEDURE — 95816 EEG AWAKE AND DROWSY: CPT | Performed by: PSYCHIATRY & NEUROLOGY

## 2020-11-25 PROCEDURE — G1004 CDSM NDSC: HCPCS

## 2020-11-25 PROCEDURE — 82948 REAGENT STRIP/BLOOD GLUCOSE: CPT

## 2020-11-25 PROCEDURE — 97163 PT EVAL HIGH COMPLEX 45 MIN: CPT | Performed by: PHYSICAL THERAPIST

## 2020-11-25 PROCEDURE — 93306 TTE W/DOPPLER COMPLETE: CPT

## 2020-11-25 PROCEDURE — 92523 SPEECH SOUND LANG COMPREHEN: CPT

## 2020-11-25 PROCEDURE — 95816 EEG AWAKE AND DROWSY: CPT

## 2020-11-25 PROCEDURE — 70551 MRI BRAIN STEM W/O DYE: CPT

## 2020-11-25 PROCEDURE — 97530 THERAPEUTIC ACTIVITIES: CPT

## 2020-11-25 RX ORDER — ATORVASTATIN CALCIUM 80 MG/1
80 TABLET, FILM COATED ORAL EVERY EVENING
Status: DISCONTINUED | OUTPATIENT
Start: 2020-11-25 | End: 2020-11-27 | Stop reason: HOSPADM

## 2020-11-25 RX ORDER — ACETAMINOPHEN 325 MG/1
650 TABLET ORAL EVERY 4 HOURS PRN
Status: DISCONTINUED | OUTPATIENT
Start: 2020-11-25 | End: 2020-11-27 | Stop reason: HOSPADM

## 2020-11-25 RX ORDER — CALCITRIOL 0.25 UG/1
0.25 CAPSULE, LIQUID FILLED ORAL DAILY
Status: DISCONTINUED | OUTPATIENT
Start: 2020-11-25 | End: 2020-11-27 | Stop reason: HOSPADM

## 2020-11-25 RX ORDER — ANASTROZOLE 1 MG/1
1 TABLET ORAL DAILY
Status: DISCONTINUED | OUTPATIENT
Start: 2020-11-25 | End: 2020-11-27 | Stop reason: HOSPADM

## 2020-11-25 RX ORDER — ONDANSETRON 2 MG/ML
4 INJECTION INTRAMUSCULAR; INTRAVENOUS EVERY 6 HOURS PRN
Status: DISCONTINUED | OUTPATIENT
Start: 2020-11-25 | End: 2020-11-27 | Stop reason: HOSPADM

## 2020-11-25 RX ORDER — SODIUM CHLORIDE 9 MG/ML
50 INJECTION, SOLUTION INTRAVENOUS CONTINUOUS
Status: DISCONTINUED | OUTPATIENT
Start: 2020-11-25 | End: 2020-11-26

## 2020-11-25 RX ADMIN — LABETALOL 20 MG/4 ML (5 MG/ML) INTRAVENOUS SYRINGE 10 MG: at 00:13

## 2020-11-25 RX ADMIN — APIXABAN 2.5 MG: 2.5 TABLET, FILM COATED ORAL at 17:21

## 2020-11-25 RX ADMIN — CALCITRIOL 0.25 MCG: 0.25 CAPSULE, LIQUID FILLED ORAL at 09:31

## 2020-11-25 RX ADMIN — SODIUM CHLORIDE 50 ML/HR: 0.9 INJECTION, SOLUTION INTRAVENOUS at 06:43

## 2020-11-25 RX ADMIN — ATORVASTATIN CALCIUM 80 MG: 80 TABLET, FILM COATED ORAL at 17:21

## 2020-11-25 RX ADMIN — ANASTROZOLE 1 MG: 1 TABLET, COATED ORAL at 09:46

## 2020-11-25 RX ADMIN — APIXABAN 2.5 MG: 2.5 TABLET, FILM COATED ORAL at 09:31

## 2020-11-26 PROBLEM — N17.9 AKI (ACUTE KIDNEY INJURY) (HCC): Status: ACTIVE | Noted: 2020-11-26

## 2020-11-26 LAB
ANION GAP SERPL CALCULATED.3IONS-SCNC: 8 MMOL/L (ref 4–13)
BACTERIA UR QL AUTO: ABNORMAL /HPF
BASOPHILS # BLD AUTO: 0.02 THOUSANDS/ΜL (ref 0–0.1)
BASOPHILS NFR BLD AUTO: 0 % (ref 0–1)
BILIRUB UR QL STRIP: NEGATIVE
BUN SERPL-MCNC: 24 MG/DL (ref 5–25)
CALCIUM SERPL-MCNC: 9.2 MG/DL (ref 8.3–10.1)
CHLORIDE SERPL-SCNC: 108 MMOL/L (ref 100–108)
CHOLEST SERPL-MCNC: 100 MG/DL (ref 50–200)
CLARITY UR: ABNORMAL
CO2 SERPL-SCNC: 24 MMOL/L (ref 21–32)
COLOR UR: YELLOW
CREAT SERPL-MCNC: 1.56 MG/DL (ref 0.6–1.3)
EOSINOPHIL # BLD AUTO: 0.1 THOUSAND/ΜL (ref 0–0.61)
EOSINOPHIL NFR BLD AUTO: 2 % (ref 0–6)
ERYTHROCYTE [DISTWIDTH] IN BLOOD BY AUTOMATED COUNT: 13.8 % (ref 11.6–15.1)
EST. AVERAGE GLUCOSE BLD GHB EST-MCNC: 163 MG/DL
EST. AVERAGE GLUCOSE BLD GHB EST-MCNC: 169 MG/DL
GFR SERPL CREATININE-BSD FRML MDRD: 32 ML/MIN/1.73SQ M
GLUCOSE SERPL-MCNC: 130 MG/DL (ref 65–140)
GLUCOSE UR STRIP-MCNC: ABNORMAL MG/DL
HBA1C MFR BLD: 7.3 %
HBA1C MFR BLD: 7.5 %
HCT VFR BLD AUTO: 31.2 % (ref 34.8–46.1)
HDLC SERPL-MCNC: 41 MG/DL
HGB BLD-MCNC: 9.9 G/DL (ref 11.5–15.4)
HGB UR QL STRIP.AUTO: ABNORMAL
IMM GRANULOCYTES # BLD AUTO: 0.02 THOUSAND/UL (ref 0–0.2)
IMM GRANULOCYTES NFR BLD AUTO: 0 % (ref 0–2)
KETONES UR STRIP-MCNC: NEGATIVE MG/DL
LDLC SERPL CALC-MCNC: 45 MG/DL (ref 0–100)
LEUKOCYTE ESTERASE UR QL STRIP: NEGATIVE
LYMPHOCYTES # BLD AUTO: 1.63 THOUSANDS/ΜL (ref 0.6–4.47)
LYMPHOCYTES NFR BLD AUTO: 29 % (ref 14–44)
MCH RBC QN AUTO: 28.7 PG (ref 26.8–34.3)
MCHC RBC AUTO-ENTMCNC: 31.7 G/DL (ref 31.4–37.4)
MCV RBC AUTO: 90 FL (ref 82–98)
MONOCYTES # BLD AUTO: 0.65 THOUSAND/ΜL (ref 0.17–1.22)
MONOCYTES NFR BLD AUTO: 12 % (ref 4–12)
NEUTROPHILS # BLD AUTO: 3.22 THOUSANDS/ΜL (ref 1.85–7.62)
NEUTS SEG NFR BLD AUTO: 57 % (ref 43–75)
NITRITE UR QL STRIP: NEGATIVE
NON-SQ EPI CELLS URNS QL MICRO: ABNORMAL /HPF
NRBC BLD AUTO-RTO: 0 /100 WBCS
PH UR STRIP.AUTO: 6 [PH]
PLATELET # BLD AUTO: 203 THOUSANDS/UL (ref 149–390)
PMV BLD AUTO: 9 FL (ref 8.9–12.7)
POTASSIUM SERPL-SCNC: 4.3 MMOL/L (ref 3.5–5.3)
PROT UR STRIP-MCNC: NEGATIVE MG/DL
RBC # BLD AUTO: 3.45 MILLION/UL (ref 3.81–5.12)
RBC #/AREA URNS AUTO: ABNORMAL /HPF
SODIUM SERPL-SCNC: 140 MMOL/L (ref 136–145)
SP GR UR STRIP.AUTO: 1.01 (ref 1–1.03)
TRIGL SERPL-MCNC: 72 MG/DL
UROBILINOGEN UR QL STRIP.AUTO: 0.2 E.U./DL
WBC # BLD AUTO: 5.64 THOUSAND/UL (ref 4.31–10.16)
WBC #/AREA URNS AUTO: ABNORMAL /HPF

## 2020-11-26 PROCEDURE — 80048 BASIC METABOLIC PNL TOTAL CA: CPT | Performed by: NURSE PRACTITIONER

## 2020-11-26 PROCEDURE — 99232 SBSQ HOSP IP/OBS MODERATE 35: CPT | Performed by: FAMILY MEDICINE

## 2020-11-26 PROCEDURE — 85025 COMPLETE CBC W/AUTO DIFF WBC: CPT | Performed by: NURSE PRACTITIONER

## 2020-11-26 PROCEDURE — 83036 HEMOGLOBIN GLYCOSYLATED A1C: CPT | Performed by: NURSE PRACTITIONER

## 2020-11-26 PROCEDURE — 99232 SBSQ HOSP IP/OBS MODERATE 35: CPT | Performed by: PSYCHIATRY & NEUROLOGY

## 2020-11-26 PROCEDURE — 83036 HEMOGLOBIN GLYCOSYLATED A1C: CPT | Performed by: FAMILY MEDICINE

## 2020-11-26 PROCEDURE — 81001 URINALYSIS AUTO W/SCOPE: CPT | Performed by: NURSE PRACTITIONER

## 2020-11-26 PROCEDURE — 99223 1ST HOSP IP/OBS HIGH 75: CPT | Performed by: INTERNAL MEDICINE

## 2020-11-26 PROCEDURE — 80061 LIPID PANEL: CPT | Performed by: NURSE PRACTITIONER

## 2020-11-26 RX ORDER — AMLODIPINE BESYLATE 10 MG/1
10 TABLET ORAL DAILY
Status: DISCONTINUED | OUTPATIENT
Start: 2020-11-26 | End: 2020-11-26

## 2020-11-26 RX ORDER — AMLODIPINE BESYLATE 10 MG/1
10 TABLET ORAL DAILY
Status: DISCONTINUED | OUTPATIENT
Start: 2020-11-27 | End: 2020-11-27 | Stop reason: HOSPADM

## 2020-11-26 RX ADMIN — CALCITRIOL 0.25 MCG: 0.25 CAPSULE, LIQUID FILLED ORAL at 09:44

## 2020-11-26 RX ADMIN — ATORVASTATIN CALCIUM 80 MG: 80 TABLET, FILM COATED ORAL at 17:52

## 2020-11-26 RX ADMIN — APIXABAN 2.5 MG: 2.5 TABLET, FILM COATED ORAL at 17:52

## 2020-11-26 RX ADMIN — APIXABAN 2.5 MG: 2.5 TABLET, FILM COATED ORAL at 09:44

## 2020-11-26 RX ADMIN — AMLODIPINE BESYLATE 10 MG: 10 TABLET ORAL at 09:44

## 2020-11-26 RX ADMIN — ANASTROZOLE 1 MG: 1 TABLET, COATED ORAL at 09:44

## 2020-11-27 ENCOUNTER — TELEPHONE (OUTPATIENT)
Dept: NEPHROLOGY | Facility: CLINIC | Age: 75
End: 2020-11-27

## 2020-11-27 VITALS
SYSTOLIC BLOOD PRESSURE: 166 MMHG | OXYGEN SATURATION: 94 % | BODY MASS INDEX: 26.79 KG/M2 | HEART RATE: 86 BPM | HEIGHT: 63 IN | DIASTOLIC BLOOD PRESSURE: 94 MMHG | WEIGHT: 151.2 LBS | TEMPERATURE: 98.7 F | RESPIRATION RATE: 18 BRPM

## 2020-11-27 LAB
ANION GAP SERPL CALCULATED.3IONS-SCNC: 9 MMOL/L (ref 4–13)
BASOPHILS # BLD AUTO: 0.02 THOUSANDS/ΜL (ref 0–0.1)
BASOPHILS NFR BLD AUTO: 0 % (ref 0–1)
BUN SERPL-MCNC: 27 MG/DL (ref 5–25)
CALCIUM SERPL-MCNC: 9 MG/DL (ref 8.3–10.1)
CHLORIDE SERPL-SCNC: 105 MMOL/L (ref 100–108)
CO2 SERPL-SCNC: 22 MMOL/L (ref 21–32)
CREAT SERPL-MCNC: 1.62 MG/DL (ref 0.6–1.3)
EOSINOPHIL # BLD AUTO: 0.17 THOUSAND/ΜL (ref 0–0.61)
EOSINOPHIL NFR BLD AUTO: 3 % (ref 0–6)
ERYTHROCYTE [DISTWIDTH] IN BLOOD BY AUTOMATED COUNT: 13.6 % (ref 11.6–15.1)
GFR SERPL CREATININE-BSD FRML MDRD: 30 ML/MIN/1.73SQ M
GLUCOSE SERPL-MCNC: 171 MG/DL (ref 65–140)
HCT VFR BLD AUTO: 32 % (ref 34.8–46.1)
HGB BLD-MCNC: 10 G/DL (ref 11.5–15.4)
IMM GRANULOCYTES # BLD AUTO: 0.02 THOUSAND/UL (ref 0–0.2)
IMM GRANULOCYTES NFR BLD AUTO: 0 % (ref 0–2)
LYMPHOCYTES # BLD AUTO: 1.98 THOUSANDS/ΜL (ref 0.6–4.47)
LYMPHOCYTES NFR BLD AUTO: 30 % (ref 14–44)
MCH RBC QN AUTO: 28.1 PG (ref 26.8–34.3)
MCHC RBC AUTO-ENTMCNC: 31.3 G/DL (ref 31.4–37.4)
MCV RBC AUTO: 90 FL (ref 82–98)
MONOCYTES # BLD AUTO: 0.69 THOUSAND/ΜL (ref 0.17–1.22)
MONOCYTES NFR BLD AUTO: 11 % (ref 4–12)
NEUTROPHILS # BLD AUTO: 3.68 THOUSANDS/ΜL (ref 1.85–7.62)
NEUTS SEG NFR BLD AUTO: 56 % (ref 43–75)
NRBC BLD AUTO-RTO: 0 /100 WBCS
PLATELET # BLD AUTO: 213 THOUSANDS/UL (ref 149–390)
PMV BLD AUTO: 9.1 FL (ref 8.9–12.7)
POTASSIUM SERPL-SCNC: 3.9 MMOL/L (ref 3.5–5.3)
RBC # BLD AUTO: 3.56 MILLION/UL (ref 3.81–5.12)
SODIUM SERPL-SCNC: 136 MMOL/L (ref 136–145)
WBC # BLD AUTO: 6.56 THOUSAND/UL (ref 4.31–10.16)

## 2020-11-27 PROCEDURE — 99232 SBSQ HOSP IP/OBS MODERATE 35: CPT

## 2020-11-27 PROCEDURE — 85025 COMPLETE CBC W/AUTO DIFF WBC: CPT | Performed by: FAMILY MEDICINE

## 2020-11-27 PROCEDURE — 99239 HOSP IP/OBS DSCHRG MGMT >30: CPT | Performed by: FAMILY MEDICINE

## 2020-11-27 PROCEDURE — 99232 SBSQ HOSP IP/OBS MODERATE 35: CPT | Performed by: NURSE PRACTITIONER

## 2020-11-27 PROCEDURE — 80048 BASIC METABOLIC PNL TOTAL CA: CPT | Performed by: FAMILY MEDICINE

## 2020-11-27 RX ORDER — AMLODIPINE BESYLATE 10 MG/1
10 TABLET ORAL DAILY
Qty: 30 TABLET | Refills: 0 | Status: SHIPPED | OUTPATIENT
Start: 2020-11-28 | End: 2022-01-01

## 2020-11-27 RX ORDER — AMLODIPINE BESYLATE 10 MG/1
10 TABLET ORAL DAILY
Qty: 30 TABLET | Refills: 0 | Status: SHIPPED | OUTPATIENT
Start: 2020-11-28 | End: 2020-11-27

## 2020-11-27 RX ADMIN — CALCITRIOL 0.25 MCG: 0.25 CAPSULE, LIQUID FILLED ORAL at 09:00

## 2020-11-27 RX ADMIN — AMLODIPINE BESYLATE 10 MG: 10 TABLET ORAL at 09:00

## 2020-11-27 RX ADMIN — APIXABAN 2.5 MG: 2.5 TABLET, FILM COATED ORAL at 09:00

## 2020-11-27 RX ADMIN — ANASTROZOLE 1 MG: 1 TABLET, COATED ORAL at 09:00

## 2020-12-02 ENCOUNTER — TELEPHONE (OUTPATIENT)
Dept: NEUROLOGY | Facility: CLINIC | Age: 75
End: 2020-12-02

## 2020-12-10 ENCOUNTER — TELEPHONE (OUTPATIENT)
Dept: NEUROLOGY | Facility: CLINIC | Age: 75
End: 2020-12-10

## 2020-12-18 ENCOUNTER — LAB (OUTPATIENT)
Dept: LAB | Facility: CLINIC | Age: 75
End: 2020-12-18
Payer: COMMERCIAL

## 2020-12-18 ENCOUNTER — TRANSCRIBE ORDERS (OUTPATIENT)
Dept: LAB | Facility: CLINIC | Age: 75
End: 2020-12-18

## 2020-12-18 DIAGNOSIS — N18.30 STAGE 3 CHRONIC KIDNEY DISEASE, UNSPECIFIED WHETHER STAGE 3A OR 3B CKD (HCC): ICD-10-CM

## 2020-12-18 DIAGNOSIS — E11.22 TYPE 2 DIABETES MELLITUS WITH ESRD (END-STAGE RENAL DISEASE) (HCC): ICD-10-CM

## 2020-12-18 DIAGNOSIS — N18.6 TYPE 2 DIABETES MELLITUS WITH ESRD (END-STAGE RENAL DISEASE) (HCC): ICD-10-CM

## 2020-12-18 DIAGNOSIS — E11.22 TYPE 2 DIABETES MELLITUS WITH ESRD (END-STAGE RENAL DISEASE) (HCC): Primary | ICD-10-CM

## 2020-12-18 DIAGNOSIS — N18.6 TYPE 2 DIABETES MELLITUS WITH ESRD (END-STAGE RENAL DISEASE) (HCC): Primary | ICD-10-CM

## 2020-12-18 LAB
25(OH)D3 SERPL-MCNC: 35.4 NG/ML (ref 30–100)
ALBUMIN SERPL BCP-MCNC: 3.3 G/DL (ref 3.5–5)
ALP SERPL-CCNC: 106 U/L (ref 46–116)
ALT SERPL W P-5'-P-CCNC: 18 U/L (ref 12–78)
ANION GAP SERPL CALCULATED.3IONS-SCNC: 7 MMOL/L (ref 4–13)
AST SERPL W P-5'-P-CCNC: 13 U/L (ref 5–45)
BACTERIA UR QL AUTO: ABNORMAL /HPF
BASOPHILS # BLD AUTO: 0.04 THOUSANDS/ΜL (ref 0–0.1)
BASOPHILS NFR BLD AUTO: 1 % (ref 0–1)
BILIRUB SERPL-MCNC: 0.36 MG/DL (ref 0.2–1)
BILIRUB UR QL STRIP: NEGATIVE
BUN SERPL-MCNC: 31 MG/DL (ref 5–25)
CALCIUM ALBUM COR SERPL-MCNC: 10.3 MG/DL (ref 8.3–10.1)
CALCIUM SERPL-MCNC: 9.7 MG/DL (ref 8.3–10.1)
CHLORIDE SERPL-SCNC: 105 MMOL/L (ref 100–108)
CHOLEST SERPL-MCNC: 141 MG/DL (ref 50–200)
CLARITY UR: ABNORMAL
CO2 SERPL-SCNC: 25 MMOL/L (ref 21–32)
COLOR UR: YELLOW
CREAT SERPL-MCNC: 1.99 MG/DL (ref 0.6–1.3)
CREAT UR-MCNC: 206 MG/DL
CREAT UR-MCNC: 206 MG/DL
EOSINOPHIL # BLD AUTO: 0.12 THOUSAND/ΜL (ref 0–0.61)
EOSINOPHIL NFR BLD AUTO: 2 % (ref 0–6)
ERYTHROCYTE [DISTWIDTH] IN BLOOD BY AUTOMATED COUNT: 14 % (ref 11.6–15.1)
EST. AVERAGE GLUCOSE BLD GHB EST-MCNC: 174 MG/DL
GFR SERPL CREATININE-BSD FRML MDRD: 24 ML/MIN/1.73SQ M
GLUCOSE SERPL-MCNC: 354 MG/DL (ref 65–140)
GLUCOSE UR STRIP-MCNC: ABNORMAL MG/DL
HBA1C MFR BLD: 7.7 %
HCT VFR BLD AUTO: 38.3 % (ref 34.8–46.1)
HDLC SERPL-MCNC: 52 MG/DL
HGB BLD-MCNC: 11.9 G/DL (ref 11.5–15.4)
HGB UR QL STRIP.AUTO: NEGATIVE
HYALINE CASTS #/AREA URNS LPF: ABNORMAL /LPF
IMM GRANULOCYTES # BLD AUTO: 0.03 THOUSAND/UL (ref 0–0.2)
IMM GRANULOCYTES NFR BLD AUTO: 0 % (ref 0–2)
KETONES UR STRIP-MCNC: NEGATIVE MG/DL
LDLC SERPL CALC-MCNC: 63 MG/DL (ref 0–100)
LEUKOCYTE ESTERASE UR QL STRIP: ABNORMAL
LYMPHOCYTES # BLD AUTO: 1.93 THOUSANDS/ΜL (ref 0.6–4.47)
LYMPHOCYTES NFR BLD AUTO: 24 % (ref 14–44)
MCH RBC QN AUTO: 27.9 PG (ref 26.8–34.3)
MCHC RBC AUTO-ENTMCNC: 31.1 G/DL (ref 31.4–37.4)
MCV RBC AUTO: 90 FL (ref 82–98)
MICROALBUMIN UR-MCNC: 27.2 MG/L (ref 0–20)
MICROALBUMIN/CREAT 24H UR: 13 MG/G CREATININE (ref 0–30)
MONOCYTES # BLD AUTO: 0.71 THOUSAND/ΜL (ref 0.17–1.22)
MONOCYTES NFR BLD AUTO: 9 % (ref 4–12)
NEUTROPHILS # BLD AUTO: 5.28 THOUSANDS/ΜL (ref 1.85–7.62)
NEUTS SEG NFR BLD AUTO: 64 % (ref 43–75)
NITRITE UR QL STRIP: NEGATIVE
NON-SQ EPI CELLS URNS QL MICRO: ABNORMAL /HPF
NONHDLC SERPL-MCNC: 89 MG/DL
NRBC BLD AUTO-RTO: 0 /100 WBCS
PH UR STRIP.AUTO: 6 [PH]
PLATELET # BLD AUTO: 285 THOUSANDS/UL (ref 149–390)
PMV BLD AUTO: 9.2 FL (ref 8.9–12.7)
POTASSIUM SERPL-SCNC: 4.3 MMOL/L (ref 3.5–5.3)
PROT SERPL-MCNC: 7.7 G/DL (ref 6.4–8.2)
PROT UR STRIP-MCNC: ABNORMAL MG/DL
PROT UR-MCNC: 36 MG/DL
PROT/CREAT UR: 0.17 MG/G{CREAT} (ref 0–0.1)
RBC # BLD AUTO: 4.26 MILLION/UL (ref 3.81–5.12)
RBC #/AREA URNS AUTO: ABNORMAL /HPF
SODIUM SERPL-SCNC: 137 MMOL/L (ref 136–145)
SP GR UR STRIP.AUTO: 1.02 (ref 1–1.03)
T4 FREE SERPL-MCNC: 0.97 NG/DL (ref 0.76–1.46)
TRIGL SERPL-MCNC: 132 MG/DL
TSH SERPL DL<=0.05 MIU/L-ACNC: 0.31 UIU/ML (ref 0.36–3.74)
UROBILINOGEN UR QL STRIP.AUTO: 0.2 E.U./DL
WBC # BLD AUTO: 8.11 THOUSAND/UL (ref 4.31–10.16)
WBC #/AREA URNS AUTO: ABNORMAL /HPF

## 2020-12-18 PROCEDURE — 82306 VITAMIN D 25 HYDROXY: CPT

## 2020-12-18 PROCEDURE — 80061 LIPID PANEL: CPT

## 2020-12-18 PROCEDURE — 82570 ASSAY OF URINE CREATININE: CPT

## 2020-12-18 PROCEDURE — 82043 UR ALBUMIN QUANTITATIVE: CPT

## 2020-12-18 PROCEDURE — 81001 URINALYSIS AUTO W/SCOPE: CPT

## 2020-12-18 PROCEDURE — 84439 ASSAY OF FREE THYROXINE: CPT

## 2020-12-18 PROCEDURE — 36415 COLL VENOUS BLD VENIPUNCTURE: CPT

## 2020-12-18 PROCEDURE — 83036 HEMOGLOBIN GLYCOSYLATED A1C: CPT

## 2020-12-18 PROCEDURE — 84156 ASSAY OF PROTEIN URINE: CPT

## 2020-12-18 PROCEDURE — 80053 COMPREHEN METABOLIC PANEL: CPT

## 2020-12-18 PROCEDURE — 84443 ASSAY THYROID STIM HORMONE: CPT

## 2020-12-18 PROCEDURE — 85025 COMPLETE CBC W/AUTO DIFF WBC: CPT

## 2021-01-01 ENCOUNTER — IMMUNIZATIONS (OUTPATIENT)
Dept: FAMILY MEDICINE CLINIC | Facility: HOSPITAL | Age: 76
End: 2021-01-01

## 2021-01-01 DIAGNOSIS — Z23 ENCOUNTER FOR IMMUNIZATION: Primary | ICD-10-CM

## 2021-01-01 PROCEDURE — 91300 COVID-19 PFIZER VACC 0.3 ML: CPT

## 2021-01-01 PROCEDURE — 0001A COVID-19 PFIZER VACC 0.3 ML: CPT

## 2021-03-24 ENCOUNTER — IMMUNIZATIONS (OUTPATIENT)
Dept: FAMILY MEDICINE CLINIC | Facility: HOSPITAL | Age: 76
End: 2021-03-24

## 2021-03-24 DIAGNOSIS — Z23 ENCOUNTER FOR IMMUNIZATION: Primary | ICD-10-CM

## 2021-03-24 PROCEDURE — 0001A SARS-COV-2 / COVID-19 MRNA VACCINE (PFIZER-BIONTECH) 30 MCG: CPT

## 2021-03-24 PROCEDURE — 91300 SARS-COV-2 / COVID-19 MRNA VACCINE (PFIZER-BIONTECH) 30 MCG: CPT

## 2021-04-17 ENCOUNTER — IMMUNIZATIONS (OUTPATIENT)
Dept: FAMILY MEDICINE CLINIC | Facility: HOSPITAL | Age: 76
End: 2021-04-17

## 2021-04-17 DIAGNOSIS — Z23 ENCOUNTER FOR IMMUNIZATION: Primary | ICD-10-CM

## 2021-04-17 PROCEDURE — 91300 SARS-COV-2 / COVID-19 MRNA VACCINE (PFIZER-BIONTECH) 30 MCG: CPT

## 2021-04-17 PROCEDURE — 0002A SARS-COV-2 / COVID-19 MRNA VACCINE (PFIZER-BIONTECH) 30 MCG: CPT

## 2021-06-08 ENCOUNTER — TRANSCRIBE ORDERS (OUTPATIENT)
Dept: LAB | Facility: CLINIC | Age: 76
End: 2021-06-08

## 2021-06-08 ENCOUNTER — APPOINTMENT (OUTPATIENT)
Dept: LAB | Facility: CLINIC | Age: 76
End: 2021-06-08
Payer: COMMERCIAL

## 2021-06-08 DIAGNOSIS — M81.0 SENILE OSTEOPOROSIS: ICD-10-CM

## 2021-06-08 DIAGNOSIS — M81.0 SENILE OSTEOPOROSIS: Primary | ICD-10-CM

## 2021-06-08 LAB
25(OH)D3 SERPL-MCNC: 31.9 NG/ML (ref 30–100)
ALBUMIN SERPL BCP-MCNC: 3.1 G/DL (ref 3.5–5)
ANION GAP SERPL CALCULATED.3IONS-SCNC: 5 MMOL/L (ref 4–13)
BUN SERPL-MCNC: 21 MG/DL (ref 5–25)
CALCIUM ALBUM COR SERPL-MCNC: 9.9 MG/DL (ref 8.3–10.1)
CALCIUM SERPL-MCNC: 9.2 MG/DL (ref 8.3–10.1)
CHLORIDE SERPL-SCNC: 110 MMOL/L (ref 100–108)
CO2 SERPL-SCNC: 26 MMOL/L (ref 21–32)
CREAT SERPL-MCNC: 1.9 MG/DL (ref 0.6–1.3)
GFR SERPL CREATININE-BSD FRML MDRD: 25 ML/MIN/1.73SQ M
GLUCOSE P FAST SERPL-MCNC: 212 MG/DL (ref 65–99)
PHOSPHATE SERPL-MCNC: 4.2 MG/DL (ref 2.3–4.1)
POTASSIUM SERPL-SCNC: 4.3 MMOL/L (ref 3.5–5.3)
SODIUM SERPL-SCNC: 141 MMOL/L (ref 136–145)

## 2021-06-08 PROCEDURE — 82306 VITAMIN D 25 HYDROXY: CPT

## 2021-06-08 PROCEDURE — 80069 RENAL FUNCTION PANEL: CPT

## 2021-06-08 PROCEDURE — 36415 COLL VENOUS BLD VENIPUNCTURE: CPT

## 2021-10-07 NOTE — PROGRESS NOTES
Progress Note Alicia Shoemaker 1945, 66 y o  female MRN: [id number]    Unit/Bed#: -01 Encounter: [id number]    Primary Care Provider: Ruchi Etienne MD (Inactive)   Date and time admitted to hospital: 9/18/2019  9:08 AM        * Acute respiratory failure with hypoxia ([address] )  Assessment & Plan  · Unclear etiology - patient feels a little bit better, however off of the oxygen she still only 86% on room air  · Patient does not carry a formal diagnosis of COPD, however has been a lifelong smoker  · Possibly secondary to a COPD exacerbation set off by an acute bronchitis  · PE was ruled out by a V/Q scan that was low probability, CTA could not be performed because of her renal insufficiency  · Her initial elevated BNP was noted, an echocardiogram yielded an ejection fraction of about 65% with no regional wall motion abnormalities  · Patient reports modest improvement over the past 24 hours, she does not feel quite yet ready to go home  · Will modify steroids to b i d  Dosing instead of t i d  Dosing  · Hopeful transition to p o  Prednisone tomorrow after which she can go home    DVT (deep venous thrombosis) ([facility])  Assessment & Plan  · The patient with history of DVT in the left lower extremity approximately 50 years ago following her pregnancy with PE  At that time she was treated with anticoagulation  · Venous duplex shows evidence of acute versus subacute nonocclusive DVT of proximal femoral vein and popliteal vein  There are areas of nonocclusive superficial thrombophlebitis noted in the great saphenous vein from mid calf to upper thigh 3 7 cm from saphenofemoral junction    · Continue apixaban    Type 2 diabetes mellitus without complication, without long-term current use of insulin [facility])  Assessment & Plan  Lab Results   Component Value Date    HGBA1C 7 8 (H) 07/08/2019       Recent Labs     09/19/19  1628 09/19/19  2030 09/20/19  0606 09/20/19  1106   POCGLU 235* 196* 209* 333*       Blood Sugar Average: Last 72 hrs:  · Hyperglycemia slightly worse because of steroids - will give a 1 time dose of Levemir today  · Continue Accu-Cheks AC and HS with sliding scale coverage    Tobacco abuse  Assessment & Plan  · Smoking cessation discussed  · Patient is not ready to quit smoking at this time  · Continue nicotine patch    Stage 3 chronic kidney disease ([facility] )  Assessment & Plan  · Baseline creatinine appears to be 1 4- 1 7 mg/dL   · Creatinine is stable        VTE Pharmacologic Prophylaxis: Pharmacologic: Apixaban (Eliquis)    Patient Centered Rounds: I have performed bedside rounds with nursing staff today  Discussions with Specialists or Other Care Team Provider:  Case management, nursing, pharmacy  Education and Discussions with Family / Patient:  Patient was brought up to par with the plan of care for today, all questions answered to her satisfaction    Current Length of Stay: 2 day(s)    Current Patient Status: Inpatient   Certification Statement: The patient will continue to require additional inpatient hospital stay due to The need for continued IV steroids    Discharge Plan:  Hopeful discharge planning for tomorrow morning    Code Status: Level 1 - Full Code    Subjective:   Patient seen and examined, feels better but does not feel 100%  Continues to feel short of breath with minimal exertion  Denies any overt pain  Objective:     Vitals:   Temp (24hrs), Av 7 °F (36 5 °C), Min:97 1 °F (36 2 °C), Max:98 °F (36 7 °C)    Temp:  [97 1 °F (36 2 °C)-98 °F (36 7 °C)] 97 1 °F (36 2 °C)  HR:  [74-77] 74  Resp:  [20-22] 20  BP: (120-144)/(69-88) 127/85  SpO2:  [86 %-94 %] 93 %  Body mass index is 29 48 kg/m²  Input and Output Summary (last 24 hours): Intake/Output Summary (Last 24 hours) at 2019 1209  Last data filed at 2019 1201  Gross per 24 hour   Intake 1250 ml   Output --   Net 1250 ml       Physical Exam:     Physical Exam   Constitutional: She is oriented to person, place, and time  She appears well-developed and well-nourished  HENT:   Head: Normocephalic and atraumatic  Nose: Nose normal    Mouth/Throat: Oropharynx is clear and moist    Eyes: Pupils are equal, round, and reactive to light  Conjunctivae and EOM are normal    Neck: Normal range of motion  Neck supple  No JVD present  No thyromegaly present  Cardiovascular: Normal rate, regular rhythm and intact distal pulses  Exam reveals no gallop and no friction rub  No murmur heard  Pulmonary/Chest: Effort normal and breath sounds normal  No respiratory distress  Increased respiratory effort  Scattered rhonchi bilaterally   Abdominal: Soft  Bowel sounds are normal  She exhibits no distension and no mass  There is no tenderness  There is no guarding  Musculoskeletal: Normal range of motion  She exhibits no edema  Lymphadenopathy:     She has no cervical adenopathy  Neurological: She is alert and oriented to person, place, and time  No cranial nerve deficit  Skin: Skin is warm  No rash noted  No erythema  Psychiatric: She has a normal mood and affect  Her behavior is normal    Vitals reviewed        Additional Data:     Labs:    Results from last 7 days   Lab Units 09/20/19  0532   WBC Thousand/uL 8 10   HEMOGLOBIN g/dL 14 9   HEMATOCRIT % 44 5   PLATELETS Thousands/uL 227   NEUTROS PCT % 83*   LYMPHS PCT % 11*   MONOS PCT % 7   EOS PCT % 0     Results from last 7 days   Lab Units 09/20/19  0532 09/19/19  0518   POTASSIUM mmol/L 4 0 4 1   CHLORIDE mmol/L 101 104   CO2 mmol/L 29 23   BUN mg/dL 41* 28*   CREATININE mg/dL 1 45* 1 30*   CALCIUM mg/dL 8 4* 8 4*   ALK PHOS U/L  --  55   ALT U/L  --  8   AST U/L  --  18     Results from last 7 days   Lab Units 09/18/19  1132   INR  1 08     Results from last 7 days   Lab Units 09/20/19  1106 09/20/19  0606 09/19/19  2030 09/19/19  1628 09/19/19  1128 09/19/19  0617 09/18/19  2015 09/18/19  1553 09/18/19  1351   POC GLUCOSE mg/dl 333* 209* 196* 235* 216* 154* 230* 231* 221* * I Have Reviewed All Lab Data Listed Above  * Additional Pertinent Lab Tests Reviewed: [facility] Admission  Reviewed    Imaging:  Imaging Reports Reviewed Today Include:  None    Recent Cultures (last 7 days):           Last 24 Hours Medication List:     Current Facility-Administered Medications:  acetaminophen 650 mg Oral Q6H PRN Jaime More, CRNP   albuterol 2 5 mg Nebulization Q4H PRN Jaime More, CRNP   amLODIPine 5 mg Oral Daily Jaime More, CRNP   apixaban 5 mg Oral BID Jona Storm MD   aspirin 81 mg Oral Daily Jaime More, CRNP   atenolol 100 mg Oral Daily Jaime More, CRNP   atorvastatin 80 mg Oral Daily Jaime More, CRNP   calcium carbonate-vitamin D 1 tablet Oral Daily With Breakfast Jaime More, CRNP   famotidine 10 mg Oral BID Jaime More, CRNP   guaiFENesin 200 mg Oral Q4H PRN Jaime More, CRNP   influenza vaccine 0 5 mL Intramuscular Once Renee Schultz MD   insulin detemir 20 Units Subcutaneous Once Jona Storm MD   insulin lispro 1-6 Units Subcutaneous TID AC Jaime More, CRNP   insulin lispro 1-6 Units Subcutaneous HS Jaime More, CRNP   methylPREDNISolone sodium succinate 40 mg Intravenous Q12H 2020 Blanco Ramirez MD   nicotine 1 patch Transdermal Daily Jaime More, CRNP   ondansetron 4 mg Intravenous Q6H PRN Jaime More, CRNP   pantoprazole 40 mg Oral Early Morning Jaime More, CRNP   PARoxetine 40 mg Oral HS Surjit Aguillon PA-C   pneumococcal 23-valent polysaccharide vaccine 0 5 mL Subcutaneous Prior to discharge Renee Schultz MD        Today, Patient Was Seen By: Jona Storm MD    ** Please Note: Dictation voice to text software may have been used in the creation of this document   ** no...

## 2021-12-13 NOTE — PHYSICAL THERAPY NOTE
2021       RE: Maggie Navarrete  [address]  [facility] [address]     Dear Colleague,    Thank you for referring your patient, Maggie Navarrete, to the [facility] [place] at [facility]. Please see a copy of my visit note below.    2021    Referring Provider: Referred Self, MD  No address on file    Primary Care Provider: Rhea Lin    CC: Urgency urinary incontinence    HPI:  Maggie Navarrete is a 69 year old female who presents for pelvic exam and discussion of management of her urgency incontinence following her new patient virtual visit on 21    This note was copied and pasted from virtual visit with Dr Kennedy on 21    Chief complaint: Urgency incontinence     Subjective      Maggie Navarrete is a 69 year old year old  () who presents for a video visit today for urgency incontinence which has gotten worse recently. Her hx is sif leiyomyosarcoma s/p RACHEL/BSO and omentectomy ( [place]), chemo in  with locally recurrent disease in  resulting in  resection of the recurrent leiomyosarcoma tumor along with partial removal of her right ureter.  First surgery was complicated with DVT and her second surgery complicated by sepsis. She was also recently admitted ( 21-21) for partial SBOs.Has had 3 open and 2 laparascopy abdominal procedures likely the risk factor for her bowel obstruction.      Today she reports that that she is trying to drink more fluids since her hospitalization ( 64oz) but says that she has been having urgency leakage particularly when walking her dog.  She empties her bladder every hour or so during the day and wakes about once during the night. She drinks 64 oz of water, one cup of coffee and a cup of milk. No soda. Occasional alcohol. Occasional stress incontinence. Wears a pantiliner as a precaution. No voiding difficulties or recurrent  Physical Therapy Evaluation     Patient's Name: Jus Mac    Admitting Diagnosis  Shortness of breath [R06 02]  Hypoxemia [R09 02]  Acute respiratory distress [R06 03]  Bronchospasm [J98 01]  DVT of deep femoral vein, left (HCC) [I82 412]    Problem List  Patient Active Problem List   Diagnosis    Acute respiratory failure with hypoxia (HCC)    DVT (deep venous thrombosis) (HCC)    Tobacco abuse    Stage 3 chronic kidney disease ([facility] )    Type 2 diabetes mellitus without complication, without long-term current use of insulin ([facility] )       Past Medical History  Past Medical History:   Diagnosis Date    [facility]     Hypertension        Past Surgical History  History reviewed  No pertinent surgical history  09/19/19 0928   Note Type   Note type Eval only   Pain Assessment   Pain Assessment No/denies pain   Pain Score No Pain   Home Living   Type of [address] [place] [address] One level;Performs ADLs on one level; Able to live on main level with bedroom/bathroom;Stairs to enter with rails  (2 KRISTEN)   Bathroom Shower/Tub Walk-in shower   Bathroom Toilet Standard   Bathroom Equipment   (no DME at baseline)   [address]   (no AD at baseline)   Prior Function   Level of [place] Independent with ADLs and functional mobility   Lives With Daughter   Receives Help From [facility], [address] in the last 6 months 1 to 4  (1 fall - mechanical fall, tripped over dog)   Vocational Retired   Comments pt drives   Restrictions/Precautions   Wells Benja Bearing Precautions Per Order No   Other Precautions O2;Fall Risk  (2 L O2 at baseline)   General   Family/Caregiver Present No   Cognition   Overall Cognitive Status WFL   Arousal/Participation Alert   Orientation Level Oriented X4   Memory Within functional limits   Following Commands Follows all commands and directions without difficulty   Comments pt agreeable to PT session   RLE Assessment   RLE Assessment WFL  (grossly 4+/5 throughout)   LLE Assessment   LLE Assessment WFL  (grossly 4+/5 throughout)   Coordination   Movements are Fluid and Coordinated 1   Sensation WFL   Light Touch   RLE Light Touch Grossly intact   LLE Light Touch Grossly intact   Bed Mobility   Supine to Sit 6  Modified independent   Additional items HOB elevated   Sit to Supine 6  Modified independent   Additional items HOB elevated   Transfers   Sit to Stand 7  Independent   Stand to Sit 7  Independent   Ambulation/Elevation   Gait pattern WNL  (no overt LOB, (-) lightheadedness/dizziness)   Gait Assistance 7  Independent   Assistive Device None   Distance 150 ft   Stair Management Assistance Not tested   Balance   Static Sitting Normal   Dynamic Sitting Normal   Static Standing Good   Dynamic Standing Good   Ambulatory Good   Endurance Deficit   Endurance Deficit Yes   Activity Tolerance   Activity Tolerance Patient tolerated treatment well   Nurse Made Aware Yes, RN verbalized pt appropriate for PT session   Assessment   Prognosis Good   Assessment Pt is 66 y o  female seen for PT evaluation s/p admit to [address] [place] [address] on 9/18/2019 w/ Acute respiratory failure with hypoxia ([facility] ); pt presented to ED c SOB  PT consulted to assess pt's functional mobility and d/c needs  Order placed for PT eval and tx, w/ up w/ A order  Comorbidities affecting pt's physical performance at time of assessment include: DM type 2, CKD stage 3, tobacco abuse, h/o DVT, h/o PE  PTA, pt was independent w/ all functional mobility w/ no AD or DME, ambulates unrestricted distances and all terrain, has 2 KRISTEN, lives w/ dtr in 1 level home and retired  Personal factors affecting pt at time of IE include: stairs to enter home and unable to perform dynamic tasks in community  Please find objective findings from PT assessment regarding body systems outlined above  The following objective measures performed on IE: Barthel Index: 90/100   Pt's UTI.           Pelvic Organ prolapse symptoms: Denies vaginal bulge or pressure sensation.     GI: Denies chronic diarrhea, constipation. Denies bothersome fecal or flatal incontinence. No defecatory disfunction        Sexual/Gyn:   Not sexually active. No partners     Objective:   Deferred ( virtual visit)      Asssessment and plan       Encounter Diagnoses   Name Primary?     Urinary frequency       Urgency of urination Yes     Mixed incontinence           We discussed today about bladder irritants and the pelvic floor exercises for better bladder control. Probably not a good candidate for vaginal estrogen cream given her hx of gyn cancer. We will bring her in for pelvic exam and will consider pelvic PT if she is unable to do pelvic floor exercises properly. We discussed that if conservative management fails, we will try other options.            Relevant Medical History:    Diabetes? No  High Blood pressure? no     Recurrent UTIs? no  Sleep Apnea? no  Obesity? BMI 28.66  History of Blood clots? History of DVT  Other medical problems:Cardiomyopathy,   Leiomyosarcoma s/p RACHEL/BSO and omentectomy ( [place]), chemo in 2016 with locally recurrent disease in 2020 resulting in  resection of the recurrent leiomyosarcoma tumor along with partial removal of her right ureter    Surgical History:      Past Surgical History:   Procedure Laterality Date     ABDOMEN SURGERY  Now    Pain, diarrhea     BREAST SURGERY  2002    right breast benign     COLONOSCOPY  2008    due in 2018     COLONOSCOPY N/A 09/08/2016    Procedure: COMBINED COLONOSCOPY, SINGLE OR MULTIPLE BIOPSY/POLYPECTOMY BY BIOPSY;  Surgeon: Abner Pepper MD;  Location:  GI     COLONOSCOPY N/A 10/18/2021    Procedure: COLONOSCOPY;  Surgeon: Jamila Villarreal MD;  Location: [facility] OR     ENT SURGERY  1975    tonsillectomy     GENITOURINARY SURGERY  1953    bladder surgery      GI SURGERY  2015    Severe constipation     GYN SURGERY  1977    C section     GYN  SURGERY  1981    laparoscopy     GYN SURGERY  2007    myomectomy     hysterecomy  11/14/2016    leiomyosarcoma     OTHER SURGICAL HISTORY Right 10/27/2017    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     REMOVE PORT VASCULAR ACCESS Right 05/09/2017    Procedure: REMOVE PORT VASCULAR ACCESS;  Right Port Removal;  Surgeon: Eric Gibson PA-C;  [facility]     REPAIR PTOSIS BILATERAL  02/15/2012    Procedure:REPAIR PTOSIS BILATERAL; BILATERAL UPPER LID PTOSIS REPAIR; Surgeon:BRYCE REYNOLDS; Location: [facility]     TUMOR REMOVAL  06/2020       OB/Gyn History:  OB History   No obstetric history on file.       Medications/Vitamins/Supplements:   Current Outpatient Medications   Medication     alendronate (FOSAMAX) 70 MG tablet     carboxymethylcellulose PF (REFRESH PLUS) 0.5 % ophthalmic solution     famotidine (PEPCID) 10 MG tablet     levothyroxine (SYNTHROID/LEVOTHROID) 75 MCG tablet     ondansetron (ZOFRAN-ODT) 4 MG ODT tab     venlafaxine (EFFEXOR-XR) 75 MG 24 hr capsule     zolpidem (AMBIEN) 5 MG tablet     No current facility-administered medications for this visit.         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     GERD (gastroesophageal reflux disease)      Hx of previous reproductive problem 1980     Kidney stone 2011     Leiomyosarcoma (H) 11/14/2016     Personal history of colonic polyps 2016    Small, benign     PONV (postoperative nausea and vomiting)      Posterior vitreous detachment of left eye 2011     Posterior vitreous detachment of right eye 2011     Ptosis of eyelid, bilateral      Sepsis (H) 07/2020     Stomach problems 2015    Abdominal pain, Diarreha     Thyroid disease     thyroid nodule resolved 2014     ROS  Social  clinical presentation is currently evolving  From PT/mobility standpoint, pt appears to be functioning close to or at mobility baseline, therefore no further immediate skilled PT needs are warranted at this time  Pt currently performing all phases of functional mobility at independent level without need for AD  Recommend pt continue to mobilize ad jaime while here  Recommend pt return to previous living environment once medically cleared  Will sign off, D/C PT  Please reconsult if further immediate skilled PT needs are warranted     Barriers to Discharge None   Goals   Patient Goals "to return home"   Treatment Day 0   Plan   Treatment/Interventions Spoke to case management;Spoke to nursing   PT Frequency One time visit   Recommendation   Recommendation Home with family support  (no skilled PT needs warranted at this time)   Equipment Recommended   (none at this time)   PT - OK to Discharge Yes  (when medically cleared)   Modified Holmdel Scale   Modified Luke Scale 1   Barthel Index   Feeding 10   Bathing 5   Grooming Score 5   Dressing Score 10   Bladder Score 10   Bowels Score 10   Toilet Use Score 10   Transfers (Bed/Chair) Score 15   Mobility (Level Surface) Score 15   Stairs Score 0  (DNT)   Barthel Index Score 90         Erven Husbands, PT History    Social History     Socioeconomic History     Marital status:      Spouse name: Not on file     Number of children: Not on file     Years of education: Not on file     Highest education level: Not on file   Occupational History     Not on file   Tobacco Use     Smoking status: Never Smoker     Smokeless tobacco: Never Used   Substance and Sexual Activity     Alcohol use: Yes     Comment: Social, not often per patient.     Drug use: No     Sexual activity: Not Currently     Partners: Male   Other Topics Concern     Parent/sibling w/ CABG, MI or angioplasty before 65F 55M? Not Asked      Service Not Asked     Blood Transfusions Not Asked     Caffeine Concern Yes     Comment: coffee few times/wk     Occupational Exposure Not Asked     Hobby Hazards Not Asked     Sleep Concern No     Stress Concern Yes     Comment: care taker of her mom     Weight Concern Yes     Comment: wants to lose wt        Special Diet No     Back Care Not Asked     Exercise Yes     Comment: walking 45 minutes/day     Bike Helmet Not Asked     Seat Belt Yes     Self-Exams Not Asked   Social History Narrative    How much exercise per week? Walking daily.    How much calcium per day? Supplement and through diet       How much caffeine per day? 3 times a week    How much vitamin D per day? Supplement    Do you/your family wear seatbelts?  Yes    Do you/your family use safety helmets? NA    Do you/your family use sunscreen? Sometimes    Do you/your family keep firearms in the home? No    Do you/your family have a smoke detector(s)? Yes    Do you feel safe in your home? Yes    Has anyone ever touched you in an unwanted manner? 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  2 grandsons now in [place] with the mother.  In a house.  .          How  much exercise per week? Daily walking, will start going to the y    How much calcium per day? Through foods       How much caffeine per day? One cup    How much vitamin D per day? Through foods    Do you/your family wear seatbelts?  Yes    Do you/your family use safety helmets? N/a    Do you/your family use sunscreen? Yes    Do you/your family keep firearms in the home? No    Do you/your family have a smoke detector(s)? Yes        Reviewed by Nicki Cornejo 10-11-21         Social Determinants of Health     Financial Resource Strain: Not on file   Food Insecurity: Not on file   Transportation Needs: Not on file   Physical Activity: Not on file   Stress: Not on file   Social Connections: Not on file   Intimate Partner Violence: Not At Risk     Fear of Current or Ex-Partner: No     Emotionally Abused: No     Physically Abused: No     Sexually Abused: No   Housing Stability: Not on file       Family History  Family History   Problem Relation Age of Onset     Family History Negative Mother         glaucoma     Glaucoma Mother      Anxiety Disorder Mother      Heart Failure Mother      Diabetes Mother      Coronary Artery Disease Mother      Hypertension Mother      Breast Cancer Mother      Depression Mother      Obesity Mother      Heart Disease Father      Glaucoma Father      Diabetes Father      Coronary Artery Disease Father      Depression Father      Osteoporosis Father      Heart Disease Maternal Grandmother      Diabetes Maternal Grandmother      Heart Disease Paternal Grandmother      Cancer Paternal Grandfather         stomach     Thyroid Disease Sister      Retinal detachment Sister      Hypertension Sister      Hypertension Sister      Lipids Sister      Cerebrovascular Disease Sister      Depression Sister      Heart Disease Paternal Aunt      Heart Disease Paternal Uncle      Anxiety Disorder Sister      Depression Sister      Thyroid Disease Sister      Thyroid Disease Sister      Osteoporosis Other   "      Allergy    Allergies   Allergen Reactions     Erythromycin GI Disturbance     Tramadol Nausea       Current Outpatient Medications   Medication     alendronate (FOSAMAX) 70 MG tablet     carboxymethylcellulose PF (REFRESH PLUS) 0.5 % ophthalmic solution     famotidine (PEPCID) 10 MG tablet     levothyroxine (SYNTHROID/LEVOTHROID) 75 MCG tablet     ondansetron (ZOFRAN-ODT) 4 MG ODT tab     venlafaxine (EFFEXOR-XR) 75 MG 24 hr capsule     zolpidem (AMBIEN) 5 MG tablet     No current facility-administered medications for this visit.       Physical Exam: BP (!) 147/86   Pulse 94   Ht 1.499 m (4' 11.02\")   Wt 64.4 kg (142 lb)   BMI 28.66 kg/m      Gen:  is alert, comfortable in no acute distress,   Abdomen: Abdomen is soft, non-tender, non-distended,   Lungs: non-labored breathing.       Pelvic Exam:   Normal external female genitalia. The urethra was normal.    Vagina: severe atrophy, well supported, no abnormal discharge.   Uterus: surgically absent  Ovaries: surgically absent  Vulva: no lesions, 2/4 vestibular pain  Rectal: deferred    Pelvic floor strength: 4/5 kegels.    Pelvic floor muscles: Tight levators, some relaxation when instructed. Myalgia mostly in her obturator internus muscles. Mild myalgia in the levators.     POPQ EXAM FOR PROLAPSE SEVERITY  No prolapse    Voiding trial:    VOID 75 ml  PVR 7 mL by Bladder ultrasound  Leak with Cough stress test :not done    Labs:   Color Urine (no units)   Date Value   11/17/2021 Yellow   06/27/2020 Yellow     Appearance Urine (no units)   Date Value   11/17/2021 Clear   06/27/2020 Slightly Cloudy     Glucose Urine (mg/dL)   Date Value   11/17/2021 Negative   06/27/2020 Negative     Bilirubin Urine (no units)   Date Value   11/17/2021 Negative   06/27/2020 Negative     Ketones Urine (mg/dL)   Date Value   11/17/2021 Negative   06/27/2020 Negative     Specific Gravity Urine (no units)   Date Value   11/17/2021 1.015   06/27/2020 1.016     pH Urine   Date Value "   11/17/2021 6.0   06/27/2020 7.5 pH (H)     Protein Albumin Urine (mg/dL)   Date Value   11/17/2021 Negative   06/27/2020 300 (A)     Urobilinogen Urine (E.U./dL)   Date Value   11/17/2021 0.2     Nitrite Urine (no units)   Date Value   11/17/2021 Negative   06/27/2020 Positive (A)     Leukocyte Esterase Urine (no units)   Date Value   11/17/2021 Negative   06/27/2020 Large (A)     CBC RESULTS:   Recent Labs   Lab Test 11/17/21  1411   WBC 7.8   RBC 4.52   HGB 13.4   HCT 41.8   MCV 93   MCH 29.6   MCHC 32.1   RDW 14.6          A/P: Maggie Navarrete is a 69 year old F with    Encounter Diagnosis   Name Primary?     Urge incontinence Yes       -Has Pelvic PT appointment in January. She will do this along with the behavioral modifications we discussed.  We discussed today that her levator myalgia may be contributing to the irritative bladder symptoms that she has. 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 Encouraged her to use replens and/or coconut oil for vaginal lubrication  -   I spent a total of 30 minutes with  Maggie Navarrete  on the date of the encounter in chart review, face to face patient visit, review of tests, documentation and/or discussion with other providers about the issues documented above.     Suad Kennedy MD, [facility]  , Department of OBGYN  Female Pelvic Medicine and Reconstructive Surgery ( Urogynecology)  CC  Patient Care Team:  Rhea Lin MD as PCP - General (Internal Medicine)  Filemon Rodríguez MD as MD (Family Practice)  Omaira Saul APRN CNP as Nurse Practitioner (Nurse Practitioner)  Ambika Dickerson RN as Clinic Care Coordinator (Oncology)  Ab Gutierrez MD as Assigned Cancer Care Provider  Jose M Keys MD as MD (Otolaryngology)  Jose M Keys MD as Assigned Surgical Provider  Kristine Flynn,  MD PhD as Assigned PCP  Suad Kennedy MD as MD (OB/Gyn)  Kym Ferraro GC as Assigned OBGYN Provider  SELF, REFERRED

## 2022-01-01 ENCOUNTER — OFFICE VISIT (OUTPATIENT)
Dept: OBGYN CLINIC | Facility: CLINIC | Age: 77
End: 2022-01-01
Payer: COMMERCIAL

## 2022-01-01 ENCOUNTER — HOSPICE ADMISSION (OUTPATIENT)
Dept: HOME HOSPICE | Facility: HOSPICE | Age: 77
End: 2022-01-01
Payer: MEDICARE

## 2022-01-01 ENCOUNTER — HOME CARE VISIT (OUTPATIENT)
Dept: HOME HOSPICE | Facility: HOSPICE | Age: 77
End: 2022-01-01
Payer: MEDICARE

## 2022-01-01 ENCOUNTER — HOME CARE VISIT (OUTPATIENT)
Dept: HOME HEALTH SERVICES | Facility: HOME HEALTHCARE | Age: 77
End: 2022-01-01
Payer: MEDICARE

## 2022-01-01 ENCOUNTER — HOSPITAL ENCOUNTER (INPATIENT)
Facility: HOSPITAL | Age: 77
LOS: 8 days | Discharge: HOSPICE | DRG: 312 | End: 2022-05-20
Attending: EMERGENCY MEDICINE | Admitting: INTERNAL MEDICINE
Payer: COMMERCIAL

## 2022-01-01 ENCOUNTER — OFFICE VISIT (OUTPATIENT)
Dept: OBGYN CLINIC | Facility: CLINIC | Age: 77
End: 2022-01-01

## 2022-01-01 ENCOUNTER — APPOINTMENT (EMERGENCY)
Dept: RADIOLOGY | Facility: HOSPITAL | Age: 77
DRG: 563 | End: 2022-01-01
Payer: COMMERCIAL

## 2022-01-01 ENCOUNTER — APPOINTMENT (EMERGENCY)
Dept: CT IMAGING | Facility: HOSPITAL | Age: 77
End: 2022-01-01
Payer: COMMERCIAL

## 2022-01-01 ENCOUNTER — APPOINTMENT (EMERGENCY)
Dept: RADIOLOGY | Facility: HOSPITAL | Age: 77
End: 2022-01-01
Payer: COMMERCIAL

## 2022-01-01 ENCOUNTER — TELEPHONE (OUTPATIENT)
Dept: OBGYN CLINIC | Facility: HOSPITAL | Age: 77
End: 2022-01-01

## 2022-01-01 ENCOUNTER — APPOINTMENT (INPATIENT)
Dept: RADIOLOGY | Facility: HOSPITAL | Age: 77
DRG: 312 | End: 2022-01-01
Payer: COMMERCIAL

## 2022-01-01 ENCOUNTER — APPOINTMENT (EMERGENCY)
Dept: CT IMAGING | Facility: HOSPITAL | Age: 77
DRG: 563 | End: 2022-01-01
Payer: COMMERCIAL

## 2022-01-01 ENCOUNTER — HOSPITAL ENCOUNTER (EMERGENCY)
Facility: HOSPITAL | Age: 77
Discharge: HOME | End: 2022-02-15
Attending: EMERGENCY MEDICINE
Payer: COMMERCIAL

## 2022-01-01 ENCOUNTER — APPOINTMENT (INPATIENT)
Dept: CT IMAGING | Facility: HOSPITAL | Age: 77
DRG: 312 | End: 2022-01-01
Payer: COMMERCIAL

## 2022-01-01 ENCOUNTER — APPOINTMENT (INPATIENT)
Dept: ULTRASOUND IMAGING | Facility: HOSPITAL | Age: 77
DRG: 312 | End: 2022-01-01
Payer: COMMERCIAL

## 2022-01-01 ENCOUNTER — TRANSCRIBE ORDERS (OUTPATIENT)
Dept: HOME HEALTH SERVICES | Facility: HOME HEALTHCARE | Age: 77
End: 2022-01-01

## 2022-01-01 ENCOUNTER — APPOINTMENT (INPATIENT)
Dept: RADIOLOGY | Facility: HOSPITAL | Age: 77
DRG: 563 | End: 2022-01-01
Payer: COMMERCIAL

## 2022-01-01 ENCOUNTER — HOSPITAL ENCOUNTER (INPATIENT)
Facility: HOSPITAL | Age: 77
LOS: 1 days | Discharge: HOME HEALTH | DRG: 563 | End: 2022-02-14
Attending: FAMILY MEDICINE | Admitting: HOSPITALIST
Payer: COMMERCIAL

## 2022-01-01 ENCOUNTER — HOSPITAL ENCOUNTER (EMERGENCY)
Facility: HOSPITAL | Age: 77
Discharge: HOME | End: 2022-02-17
Attending: EMERGENCY MEDICINE | Admitting: EMERGENCY MEDICINE
Payer: COMMERCIAL

## 2022-01-01 ENCOUNTER — HOSPITAL ENCOUNTER (OUTPATIENT)
Dept: MRI IMAGING | Facility: HOSPITAL | Age: 77
Discharge: HOME | End: 2022-03-16
Payer: MEDICARE

## 2022-01-01 ENCOUNTER — HOSPITAL ENCOUNTER (EMERGENCY)
Facility: HOSPITAL | Age: 77
Discharge: HOME | End: 2022-04-26
Attending: EMERGENCY MEDICINE | Admitting: EMERGENCY MEDICINE
Payer: COMMERCIAL

## 2022-01-01 ENCOUNTER — APPOINTMENT (OUTPATIENT)
Dept: RADIOLOGY | Facility: CLINIC | Age: 77
End: 2022-01-01
Payer: COMMERCIAL

## 2022-01-01 ENCOUNTER — APPOINTMENT (INPATIENT)
Dept: CT IMAGING | Facility: HOSPITAL | Age: 77
DRG: 563 | End: 2022-01-01
Payer: COMMERCIAL

## 2022-01-01 VITALS
RESPIRATION RATE: 17 BRPM | BODY MASS INDEX: 29.23 KG/M2 | WEIGHT: 165 LBS | SYSTOLIC BLOOD PRESSURE: 152 MMHG | DIASTOLIC BLOOD PRESSURE: 73 MMHG | HEART RATE: 78 BPM | TEMPERATURE: 97.9 F | OXYGEN SATURATION: 98 %

## 2022-01-01 VITALS
HEART RATE: 61 BPM | BODY MASS INDEX: 27.54 KG/M2 | WEIGHT: 155.42 LBS | OXYGEN SATURATION: 92 % | HEIGHT: 63 IN | HEIGHT: 63 IN | TEMPERATURE: 98.5 F | DIASTOLIC BLOOD PRESSURE: 73 MMHG | BODY MASS INDEX: 29.06 KG/M2 | WEIGHT: 164 LBS | RESPIRATION RATE: 17 BRPM | SYSTOLIC BLOOD PRESSURE: 122 MMHG

## 2022-01-01 VITALS
SYSTOLIC BLOOD PRESSURE: 144 MMHG | HEART RATE: 84 BPM | DIASTOLIC BLOOD PRESSURE: 70 MMHG | RESPIRATION RATE: 20 BRPM | TEMPERATURE: 97.9 F

## 2022-01-01 VITALS
TEMPERATURE: 97.9 F | DIASTOLIC BLOOD PRESSURE: 66 MMHG | RESPIRATION RATE: 16 BRPM | SYSTOLIC BLOOD PRESSURE: 155 MMHG | HEART RATE: 80 BPM

## 2022-01-01 VITALS
TEMPERATURE: 97.7 F | DIASTOLIC BLOOD PRESSURE: 70 MMHG | HEART RATE: 72 BPM | RESPIRATION RATE: 20 BRPM | SYSTOLIC BLOOD PRESSURE: 128 MMHG

## 2022-01-01 VITALS
TEMPERATURE: 98.2 F | RESPIRATION RATE: 20 BRPM | HEART RATE: 76 BPM | SYSTOLIC BLOOD PRESSURE: 126 MMHG | DIASTOLIC BLOOD PRESSURE: 72 MMHG

## 2022-01-01 VITALS
HEART RATE: 84 BPM | TEMPERATURE: 98.9 F | DIASTOLIC BLOOD PRESSURE: 75 MMHG | RESPIRATION RATE: 18 BRPM | SYSTOLIC BLOOD PRESSURE: 95 MMHG

## 2022-01-01 VITALS
DIASTOLIC BLOOD PRESSURE: 103 MMHG | OXYGEN SATURATION: 94 % | TEMPERATURE: 97.9 F | HEART RATE: 87 BPM | RESPIRATION RATE: 16 BRPM | SYSTOLIC BLOOD PRESSURE: 205 MMHG

## 2022-01-01 VITALS
SYSTOLIC BLOOD PRESSURE: 137 MMHG | OXYGEN SATURATION: 99 % | TEMPERATURE: 98.7 F | RESPIRATION RATE: 18 BRPM | DIASTOLIC BLOOD PRESSURE: 102 MMHG | HEART RATE: 87 BPM

## 2022-01-01 VITALS
HEART RATE: 72 BPM | TEMPERATURE: 98.7 F | RESPIRATION RATE: 18 BRPM | SYSTOLIC BLOOD PRESSURE: 160 MMHG | DIASTOLIC BLOOD PRESSURE: 98 MMHG

## 2022-01-01 VITALS
TEMPERATURE: 97.7 F | SYSTOLIC BLOOD PRESSURE: 150 MMHG | DIASTOLIC BLOOD PRESSURE: 88 MMHG | HEART RATE: 78 BPM | RESPIRATION RATE: 24 BRPM

## 2022-01-01 VITALS
HEART RATE: 80 BPM | RESPIRATION RATE: 20 BRPM | DIASTOLIC BLOOD PRESSURE: 80 MMHG | TEMPERATURE: 98 F | SYSTOLIC BLOOD PRESSURE: 138 MMHG

## 2022-01-01 VITALS
RESPIRATION RATE: 18 BRPM | DIASTOLIC BLOOD PRESSURE: 80 MMHG | TEMPERATURE: 98.4 F | SYSTOLIC BLOOD PRESSURE: 142 MMHG | HEART RATE: 84 BPM

## 2022-01-01 VITALS
TEMPERATURE: 98.2 F | HEART RATE: 72 BPM | RESPIRATION RATE: 8 BRPM | DIASTOLIC BLOOD PRESSURE: 70 MMHG | SYSTOLIC BLOOD PRESSURE: 124 MMHG

## 2022-01-01 VITALS
BODY MASS INDEX: 29.06 KG/M2 | SYSTOLIC BLOOD PRESSURE: 133 MMHG | HEART RATE: 76 BPM | HEIGHT: 63 IN | DIASTOLIC BLOOD PRESSURE: 72 MMHG | TEMPERATURE: 98.5 F | OXYGEN SATURATION: 95 % | WEIGHT: 164 LBS | RESPIRATION RATE: 17 BRPM

## 2022-01-01 VITALS
SYSTOLIC BLOOD PRESSURE: 126 MMHG | HEART RATE: 83 BPM | DIASTOLIC BLOOD PRESSURE: 60 MMHG | HEIGHT: 63 IN | BODY MASS INDEX: 29.05 KG/M2 | OXYGEN SATURATION: 97 %

## 2022-01-01 VITALS — SYSTOLIC BLOOD PRESSURE: 98 MMHG | HEART RATE: 68 BPM | RESPIRATION RATE: 18 BRPM | DIASTOLIC BLOOD PRESSURE: 56 MMHG

## 2022-01-01 VITALS
SYSTOLIC BLOOD PRESSURE: 134 MMHG | RESPIRATION RATE: 22 BRPM | TEMPERATURE: 98.6 F | HEART RATE: 80 BPM | DIASTOLIC BLOOD PRESSURE: 78 MMHG

## 2022-01-01 VITALS
TEMPERATURE: 96.4 F | HEART RATE: 60 BPM | RESPIRATION RATE: 22 BRPM | RESPIRATION RATE: 22 BRPM | DIASTOLIC BLOOD PRESSURE: 50 MMHG | HEART RATE: 88 BPM | SYSTOLIC BLOOD PRESSURE: 90 MMHG | TEMPERATURE: 97 F

## 2022-01-01 VITALS
SYSTOLIC BLOOD PRESSURE: 112 MMHG | DIASTOLIC BLOOD PRESSURE: 66 MMHG | HEART RATE: 68 BPM | TEMPERATURE: 98.5 F | RESPIRATION RATE: 20 BRPM

## 2022-01-01 DIAGNOSIS — R91.8 LUNG MASS: ICD-10-CM

## 2022-01-01 DIAGNOSIS — S09.90XA INJURY OF HEAD, INITIAL ENCOUNTER: Primary | ICD-10-CM

## 2022-01-01 DIAGNOSIS — S42.321A CLOSED DISPLACED TRANSVERSE FRACTURE OF SHAFT OF RIGHT HUMERUS, INITIAL ENCOUNTER: ICD-10-CM

## 2022-01-01 DIAGNOSIS — S42.351A CLOSED COMMINUTED RIGHT HUMERAL FRACTURE: Primary | ICD-10-CM

## 2022-01-01 DIAGNOSIS — I16.0 HYPERTENSIVE URGENCY: ICD-10-CM

## 2022-01-01 DIAGNOSIS — S42.321D CLOSED DISPLACED TRANSVERSE FRACTURE OF SHAFT OF RIGHT HUMERUS WITH ROUTINE HEALING, SUBSEQUENT ENCOUNTER: ICD-10-CM

## 2022-01-01 DIAGNOSIS — R55 SYNCOPE: ICD-10-CM

## 2022-01-01 DIAGNOSIS — S51.819A SKIN TEAR OF FOREARM WITHOUT COMPLICATION: ICD-10-CM

## 2022-01-01 DIAGNOSIS — S42.321D CLOSED DISPLACED TRANSVERSE FRACTURE OF SHAFT OF RIGHT HUMERUS WITH ROUTINE HEALING, SUBSEQUENT ENCOUNTER: Primary | ICD-10-CM

## 2022-01-01 DIAGNOSIS — Z51.5 HOSPICE CARE: Primary | ICD-10-CM

## 2022-01-01 DIAGNOSIS — C34.90 MALIGNANT NEOPLASM OF BRONCHUS AND LUNG (HCC): Primary | ICD-10-CM

## 2022-01-01 DIAGNOSIS — S42.351A CLOSED COMMINUTED RIGHT HUMERAL FRACTURE: ICD-10-CM

## 2022-01-01 DIAGNOSIS — M79.89 SWELLING OF RIGHT HAND: Primary | ICD-10-CM
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
## 2022-01-01 DIAGNOSIS — F41.9 ANXIETY: ICD-10-CM

## 2022-01-01 DIAGNOSIS — S42.213A HUMERAL SURGICAL NECK FRACTURE: ICD-10-CM

## 2022-01-01 DIAGNOSIS — S42.351A CLOSED DISPLACED COMMINUTED FRACTURE OF SHAFT OF RIGHT HUMERUS, INITIAL ENCOUNTER: ICD-10-CM

## 2022-01-01 DIAGNOSIS — R55 SYNCOPE: Primary | ICD-10-CM

## 2022-01-01 DIAGNOSIS — R59.0 LOCALIZED ENLARGED LYMPH NODES: ICD-10-CM

## 2022-01-01 DIAGNOSIS — R42 LIGHTHEADEDNESS: Primary | ICD-10-CM

## 2022-01-01 DIAGNOSIS — I95.1 ORTHOSTATIC HYPOTENSION: ICD-10-CM

## 2022-01-01 LAB
25(OH)D3 SERPL-MCNC: 39.8 NG/ML (ref 30–100)
2HR DELTA HS TROPONIN: -1 NG/L
ALBUMIN SERPL BCP-MCNC: 3 G/DL (ref 3.5–5)
ALBUMIN SERPL BCP-MCNC: 3 G/DL (ref 3.5–5)
ALBUMIN SERPL BCP-MCNC: 3.4 G/DL (ref 3.5–5)
ALP SERPL-CCNC: 109 U/L (ref 34–104)
ALP SERPL-CCNC: 81 U/L (ref 34–104)
ALP SERPL-CCNC: 89 U/L (ref 34–104)
ALT SERPL W P-5'-P-CCNC: 6 U/L (ref 7–52)
ALT SERPL W P-5'-P-CCNC: <3 U/L (ref 7–52)
ALT SERPL W P-5'-P-CCNC: <3 U/L (ref 7–52)
ANION GAP SERPL CALCULATED.3IONS-SCNC: 5 MMOL/L (ref 4–13)
ANION GAP SERPL CALCULATED.3IONS-SCNC: 6 MMOL/L (ref 4–13)
ANION GAP SERPL CALCULATED.3IONS-SCNC: 7 MMOL/L (ref 4–13)
APTT PPP: 141 SECONDS (ref 23–37)
APTT PPP: 31 SECONDS (ref 23–37)
APTT PPP: 72 SECONDS (ref 23–37)
AST SERPL W P-5'-P-CCNC: 10 U/L (ref 13–39)
AST SERPL W P-5'-P-CCNC: 6 U/L (ref 13–39)
AST SERPL W P-5'-P-CCNC: 7 U/L (ref 13–39)
ATRIAL RATE: 64 BPM
ATRIAL RATE: 73 BPM
ATRIAL RATE: 84 BPM
BACTERIA UR QL AUTO: ABNORMAL /HPF
BACTERIA UR QL AUTO: ABNORMAL /HPF
BASOPHILS # BLD AUTO: 0.01 THOUSANDS/ΜL (ref 0–0.1)
BASOPHILS # BLD AUTO: 0.01 THOUSANDS/ΜL (ref 0–0.1)
BASOPHILS # BLD AUTO: 0.02 THOUSANDS/ΜL (ref 0–0.1)
BASOPHILS # BLD AUTO: 0.03 THOUSANDS/ΜL (ref 0–0.1)
BASOPHILS NFR BLD AUTO: 0 % (ref 0–1)
BILIRUB SERPL-MCNC: 0.59 MG/DL (ref 0.2–1)
BILIRUB SERPL-MCNC: 0.64 MG/DL (ref 0.2–1)
BILIRUB SERPL-MCNC: 0.99 MG/DL (ref 0.2–1)
BILIRUB UR QL STRIP: NEGATIVE
BNP SERPL-MCNC: 100 PG/ML (ref 1–100)
BUN SERPL-MCNC: 16 MG/DL (ref 5–25)
BUN SERPL-MCNC: 17 MG/DL (ref 5–25)
BUN SERPL-MCNC: 18 MG/DL (ref 5–25)
BUN SERPL-MCNC: 18 MG/DL (ref 5–25)
BUN SERPL-MCNC: 19 MG/DL (ref 5–25)
BUN SERPL-MCNC: 19 MG/DL (ref 5–25)
BUN SERPL-MCNC: 20 MG/DL (ref 5–25)
BUN SERPL-MCNC: 20 MG/DL (ref 5–25)
BUN SERPL-MCNC: 21 MG/DL (ref 5–25)
BUN SERPL-MCNC: 27 MG/DL (ref 5–25)
BUN SERPL-MCNC: 28 MG/DL (ref 5–25)
CALCIUM ALBUM COR SERPL-MCNC: 10 MG/DL (ref 8.3–10.1)
CALCIUM ALBUM COR SERPL-MCNC: 9.3 MG/DL (ref 8.3–10.1)
CALCIUM ALBUM COR SERPL-MCNC: 9.4 MG/DL (ref 8.3–10.1)
CALCIUM SERPL-MCNC: 7.4 MG/DL (ref 8.4–10.2)
CALCIUM SERPL-MCNC: 7.6 MG/DL (ref 8.4–10.2)
CALCIUM SERPL-MCNC: 8.3 MG/DL (ref 8.4–10.2)
CALCIUM SERPL-MCNC: 8.4 MG/DL (ref 8.4–10.2)
CALCIUM SERPL-MCNC: 8.5 MG/DL (ref 8.4–10.2)
CALCIUM SERPL-MCNC: 8.5 MG/DL (ref 8.4–10.2)
CALCIUM SERPL-MCNC: 8.6 MG/DL (ref 8.4–10.2)
CALCIUM SERPL-MCNC: 8.6 MG/DL (ref 8.4–10.2)
CALCIUM SERPL-MCNC: 8.9 MG/DL (ref 8.4–10.2)
CALCIUM SERPL-MCNC: 9.1 MG/DL (ref 8.4–10.2)
CALCIUM SERPL-MCNC: 9.5 MG/DL (ref 8.4–10.2)
CARDIAC TROPONIN I PNL SERPL HS: 4 NG/L
CARDIAC TROPONIN I PNL SERPL HS: 5 NG/L
CARDIAC TROPONIN I PNL SERPL HS: 7 NG/L
CHLORIDE SERPL-SCNC: 100 MMOL/L (ref 96–108)
CHLORIDE SERPL-SCNC: 101 MMOL/L (ref 96–108)
CHLORIDE SERPL-SCNC: 101 MMOL/L (ref 96–108)
CHLORIDE SERPL-SCNC: 103 MMOL/L (ref 96–108)
CHLORIDE SERPL-SCNC: 104 MMOL/L (ref 96–108)
CHLORIDE SERPL-SCNC: 106 MMOL/L (ref 96–108)
CHLORIDE SERPL-SCNC: 98 MMOL/L (ref 96–108)
CHLORIDE SERPL-SCNC: 99 MMOL/L (ref 96–108)
CHLORIDE SERPL-SCNC: 99 MMOL/L (ref 96–108)
CLARITY UR: ABNORMAL
CLARITY UR: CLEAR
CLARITY UR: CLEAR
CO2 SERPL-SCNC: 22 MMOL/L (ref 21–32)
CO2 SERPL-SCNC: 25 MMOL/L (ref 21–32)
CO2 SERPL-SCNC: 26 MMOL/L (ref 21–32)
CO2 SERPL-SCNC: 27 MMOL/L (ref 21–32)
CO2 SERPL-SCNC: 28 MMOL/L (ref 21–32)
CO2 SERPL-SCNC: 29 MMOL/L (ref 21–32)
CO2 SERPL-SCNC: 30 MMOL/L (ref 21–32)
CO2 SERPL-SCNC: 31 MMOL/L (ref 21–32)
CO2 SERPL-SCNC: 31 MMOL/L (ref 21–32)
COLOR UR: ABNORMAL
CREAT SERPL-MCNC: 1 MG/DL (ref 0.6–1.3)
CREAT SERPL-MCNC: 1.02 MG/DL (ref 0.6–1.3)
CREAT SERPL-MCNC: 1.06 MG/DL (ref 0.6–1.3)
CREAT SERPL-MCNC: 1.1 MG/DL (ref 0.6–1.3)
CREAT SERPL-MCNC: 1.11 MG/DL (ref 0.6–1.3)
CREAT SERPL-MCNC: 1.14 MG/DL (ref 0.6–1.3)
CREAT SERPL-MCNC: 1.39 MG/DL (ref 0.6–1.3)
CREAT SERPL-MCNC: 1.44 MG/DL (ref 0.6–1.3)
CREAT SERPL-MCNC: 1.6 MG/DL (ref 0.6–1.3)
CREAT SERPL-MCNC: 1.79 MG/DL (ref 0.6–1.3)
CREAT SERPL-MCNC: 1.96 MG/DL (ref 0.6–1.3)
CREAT UR-MCNC: 27.8 MG/DL
D DIMER PPP FEU-MCNC: 2.03 UG/ML FEU
EOSINOPHIL # BLD AUTO: 0.04 THOUSAND/ΜL (ref 0–0.61)
EOSINOPHIL # BLD AUTO: 0.04 THOUSAND/ΜL (ref 0–0.61)
EOSINOPHIL # BLD AUTO: 0.05 THOUSAND/ΜL (ref 0–0.61)
EOSINOPHIL # BLD AUTO: 0.06 THOUSAND/ΜL (ref 0–0.61)
EOSINOPHIL # BLD AUTO: 0.07 THOUSAND/ΜL (ref 0–0.61)
EOSINOPHIL # BLD AUTO: 0.08 THOUSAND/ΜL (ref 0–0.61)
EOSINOPHIL # BLD AUTO: 0.09 THOUSAND/ΜL (ref 0–0.61)
EOSINOPHIL # BLD AUTO: 0.11 THOUSAND/ΜL (ref 0–0.61)
EOSINOPHIL # BLD AUTO: 0.12 THOUSAND/ΜL (ref 0–0.61)
EOSINOPHIL # BLD AUTO: 0.12 THOUSAND/ΜL (ref 0–0.61)
EOSINOPHIL NFR BLD AUTO: 0 % (ref 0–6)
EOSINOPHIL NFR BLD AUTO: 1 % (ref 0–6)
EOSINOPHIL NFR BLD AUTO: 2 % (ref 0–6)
EOSINOPHIL NFR BLD AUTO: 2 % (ref 0–6)
ERYTHROCYTE [DISTWIDTH] IN BLOOD BY AUTOMATED COUNT: 15.1 % (ref 11.6–15.1)
ERYTHROCYTE [DISTWIDTH] IN BLOOD BY AUTOMATED COUNT: 15.5 % (ref 11.6–15.1)
ERYTHROCYTE [DISTWIDTH] IN BLOOD BY AUTOMATED COUNT: 15.9 % (ref 11.6–15.1)
ERYTHROCYTE [DISTWIDTH] IN BLOOD BY AUTOMATED COUNT: 16.1 % (ref 11.6–15.1)
ERYTHROCYTE [DISTWIDTH] IN BLOOD BY AUTOMATED COUNT: 16.2 % (ref 11.6–15.1)
ERYTHROCYTE [DISTWIDTH] IN BLOOD BY AUTOMATED COUNT: 16.2 % (ref 11.6–15.1)
ERYTHROCYTE [DISTWIDTH] IN BLOOD BY AUTOMATED COUNT: 16.3 % (ref 11.6–15.1)
ERYTHROCYTE [DISTWIDTH] IN BLOOD BY AUTOMATED COUNT: 16.3 % (ref 11.6–15.1)
EST. AVERAGE GLUCOSE BLD GHB EST-MCNC: 171 MG/DL
FLUAV RNA RESP QL NAA+PROBE: NEGATIVE
FLUBV RNA RESP QL NAA+PROBE: NEGATIVE
GFR SERPL CREATININE-BSD FRML MDRD: 23 ML/MIN/1.73SQ M
GFR SERPL CREATININE-BSD FRML MDRD: 26 ML/MIN/1.73SQ M
GFR SERPL CREATININE-BSD FRML MDRD: 30 ML/MIN/1.73SQ M
GFR SERPL CREATININE-BSD FRML MDRD: 34 ML/MIN/1.73SQ M
GFR SERPL CREATININE-BSD FRML MDRD: 36 ML/MIN/1.73SQ M
GFR SERPL CREATININE-BSD FRML MDRD: 45 ML/MIN/1.73SQ M
GFR SERPL CREATININE-BSD FRML MDRD: 47 ML/MIN/1.73SQ M
GFR SERPL CREATININE-BSD FRML MDRD: 47 ML/MIN/1.73SQ M
GFR SERPL CREATININE-BSD FRML MDRD: 50 ML/MIN/1.73SQ M
GFR SERPL CREATININE-BSD FRML MDRD: 52 ML/MIN/1.73SQ M
GFR SERPL CREATININE-BSD FRML MDRD: 53 ML/MIN/1.73SQ M
GLUCOSE SERPL-MCNC: 127 MG/DL (ref 65–140)
GLUCOSE SERPL-MCNC: 130 MG/DL (ref 65–140)
GLUCOSE SERPL-MCNC: 133 MG/DL (ref 65–140)
GLUCOSE SERPL-MCNC: 138 MG/DL (ref 65–140)
GLUCOSE SERPL-MCNC: 148 MG/DL (ref 65–140)
GLUCOSE SERPL-MCNC: 149 MG/DL (ref 65–140)
GLUCOSE SERPL-MCNC: 152 MG/DL (ref 65–140)
GLUCOSE SERPL-MCNC: 155 MG/DL (ref 65–140)
GLUCOSE SERPL-MCNC: 155 MG/DL (ref 65–140)
GLUCOSE SERPL-MCNC: 160 MG/DL (ref 65–140)
GLUCOSE SERPL-MCNC: 165 MG/DL (ref 65–140)
GLUCOSE SERPL-MCNC: 166 MG/DL (ref 65–140)
GLUCOSE SERPL-MCNC: 167 MG/DL (ref 65–140)
GLUCOSE SERPL-MCNC: 168 MG/DL (ref 65–140)
GLUCOSE SERPL-MCNC: 170 MG/DL (ref 65–140)
GLUCOSE SERPL-MCNC: 171 MG/DL (ref 65–140)
GLUCOSE SERPL-MCNC: 172 MG/DL (ref 65–140)
GLUCOSE SERPL-MCNC: 173 MG/DL (ref 65–140)
GLUCOSE SERPL-MCNC: 174 MG/DL (ref 65–140)
GLUCOSE SERPL-MCNC: 174 MG/DL (ref 65–140)
GLUCOSE SERPL-MCNC: 175 MG/DL (ref 65–140)
GLUCOSE SERPL-MCNC: 178 MG/DL (ref 65–140)
GLUCOSE SERPL-MCNC: 178 MG/DL (ref 65–140)
GLUCOSE SERPL-MCNC: 182 MG/DL (ref 65–140)
GLUCOSE SERPL-MCNC: 186 MG/DL (ref 65–140)
GLUCOSE SERPL-MCNC: 188 MG/DL (ref 65–140)
GLUCOSE SERPL-MCNC: 190 MG/DL (ref 65–140)
GLUCOSE SERPL-MCNC: 191 MG/DL (ref 65–140)
GLUCOSE SERPL-MCNC: 193 MG/DL (ref 65–140)
GLUCOSE SERPL-MCNC: 195 MG/DL (ref 65–140)
GLUCOSE SERPL-MCNC: 197 MG/DL (ref 65–140)
GLUCOSE SERPL-MCNC: 199 MG/DL (ref 65–140)
GLUCOSE SERPL-MCNC: 199 MG/DL (ref 65–140)
GLUCOSE SERPL-MCNC: 201 MG/DL (ref 65–140)
GLUCOSE SERPL-MCNC: 207 MG/DL (ref 65–140)
GLUCOSE SERPL-MCNC: 214 MG/DL (ref 65–140)
GLUCOSE SERPL-MCNC: 216 MG/DL (ref 65–140)
GLUCOSE SERPL-MCNC: 229 MG/DL (ref 65–140)
GLUCOSE SERPL-MCNC: 232 MG/DL (ref 65–140)
GLUCOSE SERPL-MCNC: 241 MG/DL (ref 65–140)
GLUCOSE SERPL-MCNC: 252 MG/DL (ref 65–140)
GLUCOSE SERPL-MCNC: 256 MG/DL (ref 65–140)
GLUCOSE SERPL-MCNC: 261 MG/DL (ref 65–140)
GLUCOSE SERPL-MCNC: 262 MG/DL (ref 65–140)
GLUCOSE SERPL-MCNC: 271 MG/DL (ref 65–140)
GLUCOSE SERPL-MCNC: 300 MG/DL (ref 65–140)
GLUCOSE SERPL-MCNC: 317 MG/DL (ref 65–140)
GLUCOSE SERPL-MCNC: 346 MG/DL (ref 65–140)
GLUCOSE SERPL-MCNC: 356 MG/DL (ref 65–140)
GLUCOSE SERPL-MCNC: 391 MG/DL (ref 65–140)
GLUCOSE UR STRIP-MCNC: ABNORMAL MG/DL
HBA1C MFR BLD: 7.6 %
HCT VFR BLD AUTO: 25.9 % (ref 34.8–46.1)
HCT VFR BLD AUTO: 26 % (ref 34.8–46.1)
HCT VFR BLD AUTO: 26.1 % (ref 34.8–46.1)
HCT VFR BLD AUTO: 26.6 % (ref 34.8–46.1)
HCT VFR BLD AUTO: 26.9 % (ref 34.8–46.1)
HCT VFR BLD AUTO: 27.1 % (ref 34.8–46.1)
HCT VFR BLD AUTO: 32.1 % (ref 34.8–46.1)
HCT VFR BLD AUTO: 32.3 % (ref 34.8–46.1)
HCT VFR BLD AUTO: 32.6 % (ref 34.8–46.1)
HCT VFR BLD AUTO: 33.5 % (ref 34.8–46.1)
HGB BLD-MCNC: 10.1 G/DL (ref 11.5–15.4)
HGB BLD-MCNC: 10.1 G/DL (ref 11.5–15.4)
HGB BLD-MCNC: 7.6 G/DL (ref 11.5–15.4)
HGB BLD-MCNC: 7.8 G/DL (ref 11.5–15.4)
HGB BLD-MCNC: 7.9 G/DL (ref 11.5–15.4)
HGB BLD-MCNC: 8 G/DL (ref 11.5–15.4)
HGB BLD-MCNC: 8.1 G/DL (ref 11.5–15.4)
HGB BLD-MCNC: 8.3 G/DL (ref 11.5–15.4)
HGB BLD-MCNC: 9.7 G/DL (ref 11.5–15.4)
HGB BLD-MCNC: 9.8 G/DL (ref 11.5–15.4)
HGB UR QL STRIP.AUTO: ABNORMAL
HGB UR QL STRIP.AUTO: ABNORMAL
HGB UR QL STRIP.AUTO: NEGATIVE
IMM GRANULOCYTES # BLD AUTO: 0.01 THOUSAND/UL (ref 0–0.2)
IMM GRANULOCYTES # BLD AUTO: 0.02 THOUSAND/UL (ref 0–0.2)
IMM GRANULOCYTES # BLD AUTO: 0.03 THOUSAND/UL (ref 0–0.2)
IMM GRANULOCYTES # BLD AUTO: 0.03 THOUSAND/UL (ref 0–0.2)
IMM GRANULOCYTES # BLD AUTO: 0.04 THOUSAND/UL (ref 0–0.2)
IMM GRANULOCYTES # BLD AUTO: 0.05 THOUSAND/UL (ref 0–0.2)
IMM GRANULOCYTES NFR BLD AUTO: 0 % (ref 0–2)
IMM GRANULOCYTES NFR BLD AUTO: 1 % (ref 0–2)
INR PPP: 1.23 (ref 0.84–1.19)
INR PPP: 1.24 (ref 0.84–1.19)
KETONES UR STRIP-MCNC: NEGATIVE MG/DL
LEUKOCYTE ESTERASE UR QL STRIP: NEGATIVE
LYMPHOCYTES # BLD AUTO: 1.09 THOUSANDS/ΜL (ref 0.6–4.47)
LYMPHOCYTES # BLD AUTO: 1.2 THOUSANDS/ΜL (ref 0.6–4.47)
LYMPHOCYTES # BLD AUTO: 1.29 THOUSANDS/ΜL (ref 0.6–4.47)
LYMPHOCYTES # BLD AUTO: 1.32 THOUSANDS/ΜL (ref 0.6–4.47)
LYMPHOCYTES # BLD AUTO: 1.33 THOUSANDS/ΜL (ref 0.6–4.47)
LYMPHOCYTES # BLD AUTO: 1.39 THOUSANDS/ΜL (ref 0.6–4.47)
LYMPHOCYTES # BLD AUTO: 1.41 THOUSANDS/ΜL (ref 0.6–4.47)
LYMPHOCYTES # BLD AUTO: 1.43 THOUSANDS/ΜL (ref 0.6–4.47)
LYMPHOCYTES # BLD AUTO: 1.54 THOUSANDS/ΜL (ref 0.6–4.47)
LYMPHOCYTES # BLD AUTO: 1.64 THOUSANDS/ΜL (ref 0.6–4.47)
LYMPHOCYTES NFR BLD AUTO: 13 % (ref 14–44)
LYMPHOCYTES NFR BLD AUTO: 14 % (ref 14–44)
LYMPHOCYTES NFR BLD AUTO: 14 % (ref 14–44)
LYMPHOCYTES NFR BLD AUTO: 15 % (ref 14–44)
LYMPHOCYTES NFR BLD AUTO: 17 % (ref 14–44)
LYMPHOCYTES NFR BLD AUTO: 18 % (ref 14–44)
LYMPHOCYTES NFR BLD AUTO: 18 % (ref 14–44)
LYMPHOCYTES NFR BLD AUTO: 19 % (ref 14–44)
LYMPHOCYTES NFR BLD AUTO: 20 % (ref 14–44)
LYMPHOCYTES NFR BLD AUTO: 23 % (ref 14–44)
MAGNESIUM SERPL-MCNC: 1.8 MG/DL (ref 1.9–2.7)
MCH RBC QN AUTO: 25.9 PG (ref 26.8–34.3)
MCH RBC QN AUTO: 26.1 PG (ref 26.8–34.3)
MCH RBC QN AUTO: 26.2 PG (ref 26.8–34.3)
MCH RBC QN AUTO: 26.4 PG (ref 26.8–34.3)
MCH RBC QN AUTO: 26.5 PG (ref 26.8–34.3)
MCH RBC QN AUTO: 26.6 PG (ref 26.8–34.3)
MCH RBC QN AUTO: 26.7 PG (ref 26.8–34.3)
MCH RBC QN AUTO: 26.9 PG (ref 26.8–34.3)
MCHC RBC AUTO-ENTMCNC: 29.3 G/DL (ref 31.4–37.4)
MCHC RBC AUTO-ENTMCNC: 29.7 G/DL (ref 31.4–37.4)
MCHC RBC AUTO-ENTMCNC: 30 G/DL (ref 31.4–37.4)
MCHC RBC AUTO-ENTMCNC: 30 G/DL (ref 31.4–37.4)
MCHC RBC AUTO-ENTMCNC: 30.1 G/DL (ref 31.4–37.4)
MCHC RBC AUTO-ENTMCNC: 30.3 G/DL (ref 31.4–37.4)
MCHC RBC AUTO-ENTMCNC: 30.5 G/DL (ref 31.4–37.4)
MCHC RBC AUTO-ENTMCNC: 30.5 G/DL (ref 31.4–37.4)
MCHC RBC AUTO-ENTMCNC: 30.6 G/DL (ref 31.4–37.4)
MCHC RBC AUTO-ENTMCNC: 31 G/DL (ref 31.4–37.4)
MCV RBC AUTO: 86 FL (ref 82–98)
MCV RBC AUTO: 87 FL (ref 82–98)
MCV RBC AUTO: 88 FL (ref 82–98)
MICROALBUMIN UR-MCNC: 23.7 MG/L (ref 0–20)
MICROALBUMIN/CREAT 24H UR: 85 MG/G CREATININE (ref 0–30)
MONOCYTES # BLD AUTO: 0.49 THOUSAND/ΜL (ref 0.17–1.22)
MONOCYTES # BLD AUTO: 0.67 THOUSAND/ΜL (ref 0.17–1.22)
MONOCYTES # BLD AUTO: 0.68 THOUSAND/ΜL (ref 0.17–1.22)
MONOCYTES # BLD AUTO: 0.73 THOUSAND/ΜL (ref 0.17–1.22)
MONOCYTES # BLD AUTO: 0.75 THOUSAND/ΜL (ref 0.17–1.22)
MONOCYTES # BLD AUTO: 0.77 THOUSAND/ΜL (ref 0.17–1.22)
MONOCYTES # BLD AUTO: 0.79 THOUSAND/ΜL (ref 0.17–1.22)
MONOCYTES # BLD AUTO: 0.81 THOUSAND/ΜL (ref 0.17–1.22)
MONOCYTES # BLD AUTO: 0.86 THOUSAND/ΜL (ref 0.17–1.22)
MONOCYTES # BLD AUTO: 0.91 THOUSAND/ΜL (ref 0.17–1.22)
MONOCYTES NFR BLD AUTO: 10 % (ref 4–12)
MONOCYTES NFR BLD AUTO: 10 % (ref 4–12)
MONOCYTES NFR BLD AUTO: 11 % (ref 4–12)
MONOCYTES NFR BLD AUTO: 7 % (ref 4–12)
MONOCYTES NFR BLD AUTO: 8 % (ref 4–12)
MONOCYTES NFR BLD AUTO: 9 % (ref 4–12)
NEUTROPHILS # BLD AUTO: 4.76 THOUSANDS/ΜL (ref 1.85–7.62)
NEUTROPHILS # BLD AUTO: 5.11 THOUSANDS/ΜL (ref 1.85–7.62)
NEUTROPHILS # BLD AUTO: 5.14 THOUSANDS/ΜL (ref 1.85–7.62)
NEUTROPHILS # BLD AUTO: 5.39 THOUSANDS/ΜL (ref 1.85–7.62)
NEUTROPHILS # BLD AUTO: 5.97 THOUSANDS/ΜL (ref 1.85–7.62)
NEUTROPHILS # BLD AUTO: 6.01 THOUSANDS/ΜL (ref 1.85–7.62)
NEUTROPHILS # BLD AUTO: 6.34 THOUSANDS/ΜL (ref 1.85–7.62)
NEUTROPHILS # BLD AUTO: 6.49 THOUSANDS/ΜL (ref 1.85–7.62)
NEUTROPHILS # BLD AUTO: 6.72 THOUSANDS/ΜL (ref 1.85–7.62)
NEUTROPHILS # BLD AUTO: 7.07 THOUSANDS/ΜL (ref 1.85–7.62)
NEUTS SEG NFR BLD AUTO: 65 % (ref 43–75)
NEUTS SEG NFR BLD AUTO: 69 % (ref 43–75)
NEUTS SEG NFR BLD AUTO: 71 % (ref 43–75)
NEUTS SEG NFR BLD AUTO: 71 % (ref 43–75)
NEUTS SEG NFR BLD AUTO: 75 % (ref 43–75)
NEUTS SEG NFR BLD AUTO: 76 % (ref 43–75)
NEUTS SEG NFR BLD AUTO: 77 % (ref 43–75)
NEUTS SEG NFR BLD AUTO: 77 % (ref 43–75)
NITRITE UR QL STRIP: NEGATIVE
NON-SQ EPI CELLS URNS QL MICRO: ABNORMAL /HPF
NON-SQ EPI CELLS URNS QL MICRO: ABNORMAL /HPF
NRBC BLD AUTO-RTO: 0 /100 WBCS
P AXIS: 54 DEGREES
P AXIS: 67 DEGREES
P AXIS: 80 DEGREES
PH UR STRIP.AUTO: 6 [PH]
PH UR STRIP.AUTO: 6 [PH]
PH UR STRIP.AUTO: 7 [PH]
PHOSPHATE SERPL-MCNC: 2.6 MG/DL (ref 2.3–4.1)
PHOSPHATE SERPL-MCNC: 2.6 MG/DL (ref 2.3–4.1)
PHOSPHATE SERPL-MCNC: 3 MG/DL (ref 2.3–4.1)
PLATELET # BLD AUTO: 211 THOUSANDS/UL (ref 149–390)
PLATELET # BLD AUTO: 219 THOUSANDS/UL (ref 149–390)
PLATELET # BLD AUTO: 224 THOUSANDS/UL (ref 149–390)
PLATELET # BLD AUTO: 234 THOUSANDS/UL (ref 149–390)
PLATELET # BLD AUTO: 239 THOUSANDS/UL (ref 149–390)
PLATELET # BLD AUTO: 240 THOUSANDS/UL (ref 149–390)
PLATELET # BLD AUTO: 260 THOUSANDS/UL (ref 149–390)
PLATELET # BLD AUTO: 266 THOUSANDS/UL (ref 149–390)
PLATELET # BLD AUTO: 288 THOUSANDS/UL (ref 149–390)
PLATELET # BLD AUTO: 292 THOUSANDS/UL (ref 149–390)
PMV BLD AUTO: 8.5 FL (ref 8.9–12.7)
PMV BLD AUTO: 8.6 FL (ref 8.9–12.7)
PMV BLD AUTO: 8.8 FL (ref 8.9–12.7)
PMV BLD AUTO: 8.9 FL (ref 8.9–12.7)
PMV BLD AUTO: 8.9 FL (ref 8.9–12.7)
PMV BLD AUTO: 9 FL (ref 8.9–12.7)
PMV BLD AUTO: 9 FL (ref 8.9–12.7)
PMV BLD AUTO: 9.1 FL (ref 8.9–12.7)
PMV BLD AUTO: 9.3 FL (ref 8.9–12.7)
PMV BLD AUTO: 9.4 FL (ref 8.9–12.7)
POTASSIUM SERPL-SCNC: 4 MMOL/L (ref 3.5–5.3)
POTASSIUM SERPL-SCNC: 4.2 MMOL/L (ref 3.5–5.3)
POTASSIUM SERPL-SCNC: 4.2 MMOL/L (ref 3.5–5.3)
POTASSIUM SERPL-SCNC: 4.3 MMOL/L (ref 3.5–5.3)
POTASSIUM SERPL-SCNC: 4.3 MMOL/L (ref 3.5–5.3)
POTASSIUM SERPL-SCNC: 4.4 MMOL/L (ref 3.5–5.3)
POTASSIUM SERPL-SCNC: 4.6 MMOL/L (ref 3.5–5.3)
POTASSIUM SERPL-SCNC: 4.6 MMOL/L (ref 3.5–5.3)
POTASSIUM SERPL-SCNC: 4.7 MMOL/L (ref 3.5–5.3)
PR INTERVAL: 138 MS
PR INTERVAL: 170 MS
PR INTERVAL: 174 MS
PROT SERPL-MCNC: 6.1 G/DL (ref 6.4–8.4)
PROT SERPL-MCNC: 6.1 G/DL (ref 6.4–8.4)
PROT SERPL-MCNC: 6.7 G/DL (ref 6.4–8.4)
PROT UR STRIP-MCNC: NEGATIVE MG/DL
PROT UR-MCNC: 22 MG/DL
PROT/CREAT UR: 0.79 MG/G{CREAT} (ref 0–0.1)
PROTHROMBIN TIME: 15.4 SECONDS (ref 11.6–14.5)
PROTHROMBIN TIME: 15.4 SECONDS (ref 11.6–14.5)
PTH-INTACT SERPL-MCNC: 242.7 PG/ML (ref 18.4–80.1)
QRS AXIS: 2 DEGREES
QRS AXIS: 35 DEGREES
QRS AXIS: 69 DEGREES
QRSD INTERVAL: 84 MS
QRSD INTERVAL: 84 MS
QRSD INTERVAL: 88 MS
QT INTERVAL: 382 MS
QT INTERVAL: 418 MS
QT INTERVAL: 446 MS
QTC INTERVAL: 451 MS
QTC INTERVAL: 460 MS
QTC INTERVAL: 460 MS
RBC # BLD AUTO: 2.94 MILLION/UL (ref 3.81–5.12)
RBC # BLD AUTO: 2.98 MILLION/UL (ref 3.81–5.12)
RBC # BLD AUTO: 2.99 MILLION/UL (ref 3.81–5.12)
RBC # BLD AUTO: 3.03 MILLION/UL (ref 3.81–5.12)
RBC # BLD AUTO: 3.06 MILLION/UL (ref 3.81–5.12)
RBC # BLD AUTO: 3.08 MILLION/UL (ref 3.81–5.12)
RBC # BLD AUTO: 3.66 MILLION/UL (ref 3.81–5.12)
RBC # BLD AUTO: 3.71 MILLION/UL (ref 3.81–5.12)
RBC # BLD AUTO: 3.8 MILLION/UL (ref 3.81–5.12)
RBC # BLD AUTO: 3.83 MILLION/UL (ref 3.81–5.12)
RBC #/AREA URNS AUTO: ABNORMAL /HPF
RBC #/AREA URNS AUTO: ABNORMAL /HPF
RSV RNA RESP QL NAA+PROBE: NEGATIVE
SARS-COV-2 RNA RESP QL NAA+PROBE: NEGATIVE
SODIUM 24H UR-SCNC: 92 MOL/L
SODIUM SERPL-SCNC: 133 MMOL/L (ref 135–147)
SODIUM SERPL-SCNC: 134 MMOL/L (ref 135–147)
SODIUM SERPL-SCNC: 134 MMOL/L (ref 135–147)
SODIUM SERPL-SCNC: 135 MMOL/L (ref 135–147)
SODIUM SERPL-SCNC: 137 MMOL/L (ref 135–147)
SODIUM SERPL-SCNC: 138 MMOL/L (ref 135–147)
SP GR UR STRIP.AUTO: 1.01 (ref 1–1.03)
T WAVE AXIS: 27 DEGREES
T WAVE AXIS: 35 DEGREES
T WAVE AXIS: 40 DEGREES
UROBILINOGEN UR QL STRIP.AUTO: 0.2 E.U./DL
VENTRICULAR RATE: 64 BPM
VENTRICULAR RATE: 73 BPM
VENTRICULAR RATE: 84 BPM
WBC # BLD AUTO: 7.16 THOUSAND/UL (ref 4.31–10.16)
WBC # BLD AUTO: 7.28 THOUSAND/UL (ref 4.31–10.16)
WBC # BLD AUTO: 7.39 THOUSAND/UL (ref 4.31–10.16)
WBC # BLD AUTO: 7.72 THOUSAND/UL (ref 4.31–10.16)
WBC # BLD AUTO: 8.38 THOUSAND/UL (ref 4.31–10.16)
WBC # BLD AUTO: 8.43 THOUSAND/UL (ref 4.31–10.16)
WBC # BLD AUTO: 8.57 THOUSAND/UL (ref 4.31–10.16)
WBC # BLD AUTO: 8.6 THOUSAND/UL (ref 4.31–10.16)
WBC # BLD AUTO: 8.61 THOUSAND/UL (ref 4.31–10.16)
WBC # BLD AUTO: 9.27 THOUSAND/UL (ref 4.31–10.16)
WBC #/AREA URNS AUTO: ABNORMAL /HPF
WBC #/AREA URNS AUTO: ABNORMAL /HPF

## 2022-01-01 PROCEDURE — 71045 X-RAY EXAM CHEST 1 VIEW: CPT

## 2022-01-01 PROCEDURE — G0156 HHCP-SVS OF AIDE,EA 15 MIN: HCPCS

## 2022-01-01 PROCEDURE — 96376 TX/PRO/DX INJ SAME DRUG ADON: CPT

## 2022-01-01 PROCEDURE — 97116 GAIT TRAINING THERAPY: CPT

## 2022-01-01 PROCEDURE — 99222 1ST HOSP IP/OBS MODERATE 55: CPT | Performed by: ORTHOPAEDIC SURGERY

## 2022-01-01 PROCEDURE — 10330057 MEDICATION, GENERAL

## 2022-01-01 PROCEDURE — T2042 HOSPICE ROUTINE HOME CARE: HCPCS

## 2022-01-01 PROCEDURE — 10330064 UNDERPAD, REUSE 36X36 1DZ     BECKCL

## 2022-01-01 PROCEDURE — 96374 THER/PROPH/DIAG INJ IV PUSH: CPT

## 2022-01-01 PROCEDURE — 99232 SBSQ HOSP IP/OBS MODERATE 35: CPT | Performed by: INTERNAL MEDICINE

## 2022-01-01 PROCEDURE — 84156 ASSAY OF PROTEIN URINE: CPT | Performed by: INTERNAL MEDICINE

## 2022-01-01 PROCEDURE — 85610 PROTHROMBIN TIME: CPT

## 2022-01-01 PROCEDURE — 82948 REAGENT STRIP/BLOOD GLUCOSE: CPT

## 2022-01-01 PROCEDURE — G0299 HHS/HOSPICE OF RN EA 15 MIN: HCPCS

## 2022-01-01 PROCEDURE — 85025 COMPLETE CBC W/AUTO DIFF WBC: CPT | Performed by: STUDENT IN AN ORGANIZED HEALTH CARE EDUCATION/TRAINING PROGRAM

## 2022-01-01 PROCEDURE — 36415 COLL VENOUS BLD VENIPUNCTURE: CPT

## 2022-01-01 PROCEDURE — 96360 HYDRATION IV INFUSION INIT: CPT

## 2022-01-01 PROCEDURE — 99284 EMERGENCY DEPT VISIT MOD MDM: CPT | Performed by: EMERGENCY MEDICINE

## 2022-01-01 PROCEDURE — 80053 COMPREHEN METABOLIC PANEL: CPT | Performed by: INTERNAL MEDICINE

## 2022-01-01 PROCEDURE — 93005 ELECTROCARDIOGRAM TRACING: CPT

## 2022-01-01 PROCEDURE — 97530 THERAPEUTIC ACTIVITIES: CPT

## 2022-01-01 PROCEDURE — 80048 BASIC METABOLIC PNL TOTAL CA: CPT | Performed by: STUDENT IN AN ORGANIZED HEALTH CARE EDUCATION/TRAINING PROGRAM

## 2022-01-01 PROCEDURE — 83735 ASSAY OF MAGNESIUM: CPT | Performed by: INTERNAL MEDICINE

## 2022-01-01 PROCEDURE — 84300 ASSAY OF URINE SODIUM: CPT | Performed by: INTERNAL MEDICINE

## 2022-01-01 PROCEDURE — 99285 EMERGENCY DEPT VISIT HI MDM: CPT

## 2022-01-01 PROCEDURE — G1004 CDSM NDSC: HCPCS

## 2022-01-01 PROCEDURE — 99239 HOSP IP/OBS DSCHRG MGMT >30: CPT | Performed by: INTERNAL MEDICINE

## 2022-01-01 PROCEDURE — 96361 HYDRATE IV INFUSION ADD-ON: CPT

## 2022-01-01 PROCEDURE — 80048 BASIC METABOLIC PNL TOTAL CA: CPT | Performed by: FAMILY MEDICINE

## 2022-01-01 PROCEDURE — 76770 US EXAM ABDO BACK WALL COMP: CPT

## 2022-01-01 PROCEDURE — 73060 X-RAY EXAM OF HUMERUS: CPT

## 2022-01-01 PROCEDURE — 99284 EMERGENCY DEPT VISIT MOD MDM: CPT | Performed by: FAMILY MEDICINE

## 2022-01-01 PROCEDURE — 24500 CLTX HUMRL SHFT FX W/O MNPJ: CPT | Performed by: ORTHOPAEDIC SURGERY

## 2022-01-01 PROCEDURE — 85379 FIBRIN DEGRADATION QUANT: CPT | Performed by: FAMILY MEDICINE

## 2022-01-01 PROCEDURE — 99284 EMERGENCY DEPT VISIT MOD MDM: CPT | Performed by: PHYSICIAN ASSISTANT

## 2022-01-01 PROCEDURE — 80053 COMPREHEN METABOLIC PANEL: CPT | Performed by: EMERGENCY MEDICINE

## 2022-01-01 PROCEDURE — 97110 THERAPEUTIC EXERCISES: CPT

## 2022-01-01 PROCEDURE — 99222 1ST HOSP IP/OBS MODERATE 55: CPT | Performed by: INTERNAL MEDICINE

## 2022-01-01 PROCEDURE — 99284 EMERGENCY DEPT VISIT MOD MDM: CPT

## 2022-01-01 PROCEDURE — G0155 HHCP-SVS OF CSW,EA 15 MIN: HCPCS

## 2022-01-01 PROCEDURE — 36415 COLL VENOUS BLD VENIPUNCTURE: CPT | Performed by: STUDENT IN AN ORGANIZED HEALTH CARE EDUCATION/TRAINING PROGRAM

## 2022-01-01 PROCEDURE — 0241U HB NFCT DS VIR RESP RNA 4 TRGT: CPT | Performed by: PHYSICIAN ASSISTANT

## 2022-01-01 PROCEDURE — 99222 1ST HOSP IP/OBS MODERATE 55: CPT | Performed by: PHYSICIAN ASSISTANT

## 2022-01-01 PROCEDURE — 73030 X-RAY EXAM OF SHOULDER: CPT

## 2022-01-01 PROCEDURE — 83036 HEMOGLOBIN GLYCOSYLATED A1C: CPT | Performed by: STUDENT IN AN ORGANIZED HEALTH CARE EDUCATION/TRAINING PROGRAM

## 2022-01-01 PROCEDURE — 81003 URINALYSIS AUTO W/O SCOPE: CPT | Performed by: PHYSICIAN ASSISTANT

## 2022-01-01 PROCEDURE — 99213 OFFICE O/P EST LOW 20 MIN: CPT | Performed by: ORTHOPAEDIC SURGERY

## 2022-01-01 PROCEDURE — 29105 APPLICATION LONG ARM SPLINT: CPT | Performed by: PHYSICIAN ASSISTANT

## 2022-01-01 PROCEDURE — 85025 COMPLETE CBC W/AUTO DIFF WBC: CPT | Performed by: PHYSICIAN ASSISTANT

## 2022-01-01 PROCEDURE — 93010 ELECTROCARDIOGRAM REPORT: CPT | Performed by: INTERNAL MEDICINE

## 2022-01-01 PROCEDURE — 80048 BASIC METABOLIC PNL TOTAL CA: CPT | Performed by: INTERNAL MEDICINE

## 2022-01-01 PROCEDURE — 82570 ASSAY OF URINE CREATININE: CPT | Performed by: INTERNAL MEDICINE

## 2022-01-01 PROCEDURE — 80048 BASIC METABOLIC PNL TOTAL CA: CPT

## 2022-01-01 PROCEDURE — 85025 COMPLETE CBC W/AUTO DIFF WBC: CPT | Performed by: INTERNAL MEDICINE

## 2022-01-01 PROCEDURE — 83970 ASSAY OF PARATHORMONE: CPT | Performed by: INTERNAL MEDICINE

## 2022-01-01 PROCEDURE — 82043 UR ALBUMIN QUANTITATIVE: CPT | Performed by: INTERNAL MEDICINE

## 2022-01-01 PROCEDURE — 99223 1ST HOSP IP/OBS HIGH 75: CPT

## 2022-01-01 PROCEDURE — NC001 PR NO CHARGE: Performed by: NURSE PRACTITIONER

## 2022-01-01 PROCEDURE — 97166 OT EVAL MOD COMPLEX 45 MIN: CPT

## 2022-01-01 PROCEDURE — 85025 COMPLETE CBC W/AUTO DIFF WBC: CPT | Performed by: EMERGENCY MEDICINE

## 2022-01-01 PROCEDURE — 36415 COLL VENOUS BLD VENIPUNCTURE: CPT | Performed by: FAMILY MEDICINE

## 2022-01-01 PROCEDURE — 0PSFXZZ REPOSITION RIGHT HUMERAL SHAFT, EXTERNAL APPROACH: ICD-10-PCS | Performed by: ORTHOPAEDIC SURGERY

## 2022-01-01 PROCEDURE — 10330064 BRIEF, WINGS CHOICE+ QUILTED LG (18/BG 4

## 2022-01-01 PROCEDURE — 99024 POSTOP FOLLOW-UP VISIT: CPT | Performed by: ORTHOPAEDIC SURGERY

## 2022-01-01 PROCEDURE — 82306 VITAMIN D 25 HYDROXY: CPT | Performed by: INTERNAL MEDICINE

## 2022-01-01 PROCEDURE — 84484 ASSAY OF TROPONIN QUANT: CPT | Performed by: FAMILY MEDICINE

## 2022-01-01 PROCEDURE — 99283 EMERGENCY DEPT VISIT LOW MDM: CPT

## 2022-01-01 PROCEDURE — 12005 RPR S/N/A/GEN/TRK12.6-20.0CM: CPT | Performed by: EMERGENCY MEDICINE

## 2022-01-01 PROCEDURE — 84100 ASSAY OF PHOSPHORUS: CPT | Performed by: INTERNAL MEDICINE

## 2022-01-01 PROCEDURE — NC001 PR NO CHARGE: Performed by: INTERNAL MEDICINE

## 2022-01-01 PROCEDURE — 80048 BASIC METABOLIC PNL TOTAL CA: CPT | Performed by: PHYSICIAN ASSISTANT

## 2022-01-01 PROCEDURE — 83880 ASSAY OF NATRIURETIC PEPTIDE: CPT | Performed by: FAMILY MEDICINE

## 2022-01-01 PROCEDURE — 10330087 HSPC SERVICE INTENSITY ADD-ON

## 2022-01-01 PROCEDURE — 97112 NEUROMUSCULAR REEDUCATION: CPT

## 2022-01-01 PROCEDURE — 85730 THROMBOPLASTIN TIME PARTIAL: CPT

## 2022-01-01 PROCEDURE — 36415 COLL VENOUS BLD VENIPUNCTURE: CPT | Performed by: PHYSICIAN ASSISTANT

## 2022-01-01 PROCEDURE — 81001 URINALYSIS AUTO W/SCOPE: CPT | Performed by: FAMILY MEDICINE

## 2022-01-01 PROCEDURE — 85730 THROMBOPLASTIN TIME PARTIAL: CPT | Performed by: HOSPITALIST

## 2022-01-01 PROCEDURE — 85610 PROTHROMBIN TIME: CPT | Performed by: FAMILY MEDICINE

## 2022-01-01 PROCEDURE — 94761 N-INVAS EAR/PLS OXIMETRY MLT: CPT

## 2022-01-01 PROCEDURE — 71275 CT ANGIOGRAPHY CHEST: CPT

## 2022-01-01 PROCEDURE — 97163 PT EVAL HIGH COMPLEX 45 MIN: CPT

## 2022-01-01 PROCEDURE — A9585 GADOBUTROL INJECTION: HCPCS | Performed by: RADIOLOGY

## 2022-01-01 PROCEDURE — 92610 EVALUATE SWALLOWING FUNCTION: CPT

## 2022-01-01 PROCEDURE — 70450 CT HEAD/BRAIN W/O DYE: CPT

## 2022-01-01 PROCEDURE — 84484 ASSAY OF TROPONIN QUANT: CPT | Performed by: EMERGENCY MEDICINE

## 2022-01-01 PROCEDURE — 81001 URINALYSIS AUTO W/SCOPE: CPT | Performed by: INTERNAL MEDICINE

## 2022-01-01 PROCEDURE — 70553 MRI BRAIN STEM W/O & W/DYE: CPT

## 2022-01-01 PROCEDURE — 99223 1ST HOSP IP/OBS HIGH 75: CPT | Performed by: INTERNAL MEDICINE

## 2022-01-01 PROCEDURE — 85025 COMPLETE CBC W/AUTO DIFF WBC: CPT | Performed by: FAMILY MEDICINE

## 2022-01-01 PROCEDURE — 97760 ORTHOTIC MGMT&TRAING 1ST ENC: CPT

## 2022-01-01 PROCEDURE — 99220 PR INITIAL OBSERVATION CARE/DAY 70 MINUTES: CPT | Performed by: STUDENT IN AN ORGANIZED HEALTH CARE EDUCATION/TRAINING PROGRAM

## 2022-01-01 RX ORDER — ACETAMINOPHEN 325 MG/1
650 TABLET ORAL EVERY 6 HOURS PRN
Status: DISCONTINUED | OUTPATIENT
Start: 2022-01-01 | End: 2022-01-01 | Stop reason: HOSPADM

## 2022-01-01 RX ORDER — CARVEDILOL 3.12 MG/1
3.12 TABLET ORAL 2 TIMES DAILY WITH MEALS
Status: DISCONTINUED | OUTPATIENT
Start: 2022-01-01 | End: 2022-01-01

## 2022-01-01 RX ORDER — ATENOLOL 25 MG/1
100 TABLET ORAL DAILY
Status: DISCONTINUED | OUTPATIENT
Start: 2022-01-01 | End: 2022-01-01 | Stop reason: HOSPADM

## 2022-01-01 RX ORDER — SODIUM CHLORIDE, SODIUM GLUCONATE, SODIUM ACETATE, POTASSIUM CHLORIDE, MAGNESIUM CHLORIDE, SODIUM PHOSPHATE, DIBASIC, AND POTASSIUM PHOSPHATE .53; .5; .37; .037; .03; .012; .00082 G/100ML; G/100ML; G/100ML; G/100ML; G/100ML; G/100ML; G/100ML
250 INJECTION, SOLUTION INTRAVENOUS ONCE
Status: COMPLETED | OUTPATIENT
Start: 2022-01-01 | End: 2022-01-01

## 2022-01-01 RX ORDER — OXYCODONE HYDROCHLORIDE 5 MG/1
5 TABLET ORAL EVERY 6 HOURS PRN
Status: DISCONTINUED | OUTPATIENT
Start: 2022-01-01 | End: 2022-01-01 | Stop reason: HOSPADM

## 2022-01-01 RX ORDER — MELATONIN
5000 3 TIMES WEEKLY
Status: DISCONTINUED | OUTPATIENT
Start: 2022-01-01 | End: 2022-01-01 | Stop reason: HOSPADM

## 2022-01-01 RX ORDER — UREA 10 %
500 LOTION (ML) TOPICAL DAILY
Status: DISCONTINUED | OUTPATIENT
Start: 2022-01-01 | End: 2022-01-01 | Stop reason: HOSPADM

## 2022-01-01 RX ORDER — ACETAMINOPHEN 325 MG/1
975 TABLET ORAL EVERY 8 HOURS SCHEDULED
Status: DISCONTINUED | OUTPATIENT
Start: 2022-01-01 | End: 2022-01-01 | Stop reason: HOSPADM

## 2022-01-01 RX ORDER — SODIUM CHLORIDE 9 MG/ML
100 INJECTION, SOLUTION INTRAVENOUS CONTINUOUS
Status: DISCONTINUED | OUTPATIENT
Start: 2022-01-01 | End: 2022-01-01

## 2022-01-01 RX ORDER — PAROXETINE HYDROCHLORIDE 20 MG/1
40 TABLET, FILM COATED ORAL DAILY
Status: DISCONTINUED | OUTPATIENT
Start: 2022-01-01 | End: 2022-01-01 | Stop reason: HOSPADM

## 2022-01-01 RX ORDER — LORAZEPAM 0.5 MG/1
0.5 TABLET ORAL EVERY 8 HOURS PRN
Qty: 12 TABLET | Refills: 0 | Status: SHIPPED | OUTPATIENT
Start: 2022-01-01

## 2022-01-01 RX ORDER — OXYCODONE HYDROCHLORIDE 5 MG/1
2.5 TABLET ORAL EVERY 6 HOURS PRN
Status: DISCONTINUED | OUTPATIENT
Start: 2022-01-01 | End: 2022-01-01 | Stop reason: HOSPADM

## 2022-01-01 RX ORDER — CARVEDILOL 6.25 MG/1
6.25 TABLET ORAL 2 TIMES DAILY WITH MEALS
Qty: 30 TABLET | Refills: 0 | Status: SHIPPED | OUTPATIENT
Start: 2022-01-01

## 2022-01-01 RX ORDER — HEPARIN SODIUM 1000 [USP'U]/ML
5600 INJECTION, SOLUTION INTRAVENOUS; SUBCUTANEOUS
Status: DISCONTINUED | OUTPATIENT
Start: 2022-01-01 | End: 2022-01-01

## 2022-01-01 RX ORDER — LABETALOL HYDROCHLORIDE 5 MG/ML
10 INJECTION, SOLUTION INTRAVENOUS EVERY 6 HOURS PRN
Status: DISCONTINUED | OUTPATIENT
Start: 2022-01-01 | End: 2022-01-01 | Stop reason: HOSPADM

## 2022-01-01 RX ORDER — ASPIRIN 81 MG/1
81 TABLET ORAL DAILY
Status: DISCONTINUED | OUTPATIENT
Start: 2022-01-01 | End: 2022-01-01 | Stop reason: HOSPADM

## 2022-01-01 RX ORDER — AMLODIPINE BESYLATE 10 MG/1
10 TABLET ORAL DAILY
Status: DISCONTINUED | OUTPATIENT
Start: 2022-01-01 | End: 2022-01-01 | Stop reason: HOSPADM

## 2022-01-01 RX ORDER — AMLODIPINE BESYLATE 5 MG/1
5 TABLET ORAL DAILY
Qty: 30 TABLET | Refills: 0 | Status: SHIPPED | OUTPATIENT
Start: 2022-01-01 | End: 2022-01-01 | Stop reason: SDUPTHER

## 2022-01-01 RX ORDER — LORAZEPAM 2 MG/ML
0.5 INJECTION INTRAMUSCULAR EVERY 4 HOURS PRN
Status: DISCONTINUED | OUTPATIENT
Start: 2022-01-01 | End: 2022-01-01 | Stop reason: HOSPADM

## 2022-01-01 RX ORDER — CARVEDILOL 3.12 MG/1
6.25 TABLET ORAL 2 TIMES DAILY WITH MEALS
Status: DISCONTINUED | OUTPATIENT
Start: 2022-01-01 | End: 2022-01-01 | Stop reason: HOSPADM

## 2022-01-01 RX ORDER — AMLODIPINE BESYLATE 5 MG/1
5 TABLET ORAL DAILY
Status: DISCONTINUED | OUTPATIENT
Start: 2022-01-01 | End: 2022-01-01 | Stop reason: HOSPADM

## 2022-01-01 RX ORDER — ATORVASTATIN CALCIUM 80 MG/1
80 TABLET, FILM COATED ORAL DAILY
Status: DISCONTINUED | OUTPATIENT
Start: 2022-01-01 | End: 2022-01-01 | Stop reason: HOSPADM

## 2022-01-01 RX ORDER — HEPARIN SODIUM 10000 [USP'U]/100ML
3-30 INJECTION, SOLUTION INTRAVENOUS
Status: DISCONTINUED | OUTPATIENT
Start: 2022-01-01 | End: 2022-01-01

## 2022-01-01 RX ORDER — ONDANSETRON 2 MG/ML
4 INJECTION INTRAMUSCULAR; INTRAVENOUS EVERY 6 HOURS PRN
Status: DISCONTINUED | OUTPATIENT
Start: 2022-01-01 | End: 2022-01-01 | Stop reason: HOSPADM

## 2022-01-01 RX ORDER — HEPARIN SODIUM 1000 [USP'U]/ML
5600 INJECTION, SOLUTION INTRAVENOUS; SUBCUTANEOUS ONCE
Status: COMPLETED | OUTPATIENT
Start: 2022-01-01 | End: 2022-01-01

## 2022-01-01 RX ORDER — FENTANYL CITRATE 50 UG/ML
100 INJECTION, SOLUTION INTRAMUSCULAR; INTRAVENOUS ONCE
Status: COMPLETED | OUTPATIENT
Start: 2022-01-01 | End: 2022-01-01

## 2022-01-01 RX ORDER — OXYCODONE HYDROCHLORIDE 5 MG/1
5 TABLET ORAL ONCE
Status: COMPLETED | OUTPATIENT
Start: 2022-01-01 | End: 2022-01-01

## 2022-01-01 RX ORDER — HYDRALAZINE HYDROCHLORIDE 20 MG/ML
5 INJECTION INTRAMUSCULAR; INTRAVENOUS EVERY 6 HOURS PRN
Status: DISCONTINUED | OUTPATIENT
Start: 2022-01-01 | End: 2022-01-01 | Stop reason: HOSPADM

## 2022-01-01 RX ORDER — LORAZEPAM 0.5 MG/1
0.5 TABLET ORAL EVERY 8 HOURS PRN
Qty: 30 TABLET | Refills: 0 | Status: SHIPPED | OUTPATIENT
Start: 2022-01-01 | End: 2022-01-01 | Stop reason: CLARIF

## 2022-01-01 RX ORDER — LISINOPRIL 10 MG/1
10 TABLET ORAL DAILY
Status: DISCONTINUED | OUTPATIENT
Start: 2022-01-01 | End: 2022-01-01

## 2022-01-01 RX ORDER — CARVEDILOL 3.12 MG/1
6.25 TABLET ORAL 2 TIMES DAILY WITH MEALS
Status: CANCELLED | OUTPATIENT
Start: 2022-01-01

## 2022-01-01 RX ORDER — LORAZEPAM 0.5 MG/1
0.5 TABLET ORAL ONCE
Status: COMPLETED | OUTPATIENT
Start: 2022-01-01 | End: 2022-01-01

## 2022-01-01 RX ORDER — PAROXETINE HYDROCHLORIDE 20 MG/1
40 TABLET, FILM COATED ORAL DAILY
Status: DISCONTINUED | OUTPATIENT
Start: 2022-01-01 | End: 2022-01-01

## 2022-01-01 RX ORDER — FENTANYL CITRATE 50 UG/ML
50 INJECTION, SOLUTION INTRAMUSCULAR; INTRAVENOUS ONCE
Status: COMPLETED | OUTPATIENT
Start: 2022-01-01 | End: 2022-01-01

## 2022-01-01 RX ORDER — OXYCODONE HYDROCHLORIDE 5 MG/1
5 TABLET ORAL EVERY 4 HOURS PRN
Status: DISCONTINUED | OUTPATIENT
Start: 2022-01-01 | End: 2022-01-01

## 2022-01-01 RX ORDER — ANASTROZOLE 1 MG/1
1 TABLET ORAL DAILY
Status: DISCONTINUED | OUTPATIENT
Start: 2022-01-01 | End: 2022-01-01 | Stop reason: HOSPADM

## 2022-01-01 RX ORDER — HYDRALAZINE HYDROCHLORIDE 20 MG/ML
10 INJECTION INTRAMUSCULAR; INTRAVENOUS EVERY 6 HOURS PRN
Status: DISCONTINUED | OUTPATIENT
Start: 2022-01-01 | End: 2022-01-01 | Stop reason: HOSPADM

## 2022-01-01 RX ORDER — FAMOTIDINE 20 MG/1
20 TABLET, FILM COATED ORAL 2 TIMES DAILY
Status: DISCONTINUED | OUTPATIENT
Start: 2022-01-01 | End: 2022-01-01 | Stop reason: HOSPADM

## 2022-01-01 RX ORDER — MORPHINE SULFATE 100 MG/5ML
5 SOLUTION, CONCENTRATE ORAL EVERY 6 HOURS PRN
Qty: 30 ML | Refills: 0 | Status: SHIPPED | OUTPATIENT
Start: 2022-01-01 | End: 2022-01-01 | Stop reason: DRUGHIGH

## 2022-01-01 RX ORDER — HEPARIN SODIUM 1000 [USP'U]/ML
2800 INJECTION, SOLUTION INTRAVENOUS; SUBCUTANEOUS
Status: DISCONTINUED | OUTPATIENT
Start: 2022-01-01 | End: 2022-01-01

## 2022-01-01 RX ORDER — AMLODIPINE BESYLATE 5 MG/1
5 TABLET ORAL DAILY
Qty: 30 TABLET | Refills: 0 | Status: SHIPPED | OUTPATIENT
Start: 2022-01-01 | End: 2022-01-01

## 2022-01-01 RX ORDER — PANTOPRAZOLE SODIUM 40 MG/1
40 TABLET, DELAYED RELEASE ORAL
Status: DISCONTINUED | OUTPATIENT
Start: 2022-01-01 | End: 2022-01-01 | Stop reason: HOSPADM

## 2022-01-01 RX ORDER — CALCITRIOL 0.25 UG/1
0.25 CAPSULE, LIQUID FILLED ORAL DAILY
Status: DISCONTINUED | OUTPATIENT
Start: 2022-01-01 | End: 2022-01-01 | Stop reason: HOSPADM

## 2022-01-01 RX ORDER — LABETALOL HYDROCHLORIDE 5 MG/ML
10 INJECTION, SOLUTION INTRAVENOUS EVERY 6 HOURS PRN
Status: DISCONTINUED | OUTPATIENT
Start: 2022-01-01 | End: 2022-01-01

## 2022-01-01 RX ORDER — AMLODIPINE BESYLATE 5 MG/1
5 TABLET ORAL DAILY
Status: DISCONTINUED | OUTPATIENT
Start: 2022-01-01 | End: 2022-01-01

## 2022-01-01 RX ORDER — TRAMADOL HYDROCHLORIDE 50 MG/1
50 TABLET ORAL EVERY 6 HOURS PRN
Qty: 30 TABLET | Refills: 0 | Status: SHIPPED | OUTPATIENT
Start: 2022-01-01 | End: 2022-01-01

## 2022-01-01 RX ORDER — INSULIN LISPRO 100 [IU]/ML
1-5 INJECTION, SOLUTION INTRAVENOUS; SUBCUTANEOUS
Status: DISCONTINUED | OUTPATIENT
Start: 2022-01-01 | End: 2022-01-01 | Stop reason: HOSPADM

## 2022-01-01 RX ADMIN — ATORVASTATIN CALCIUM 80 MG: 80 TABLET, FILM COATED ORAL at 08:34

## 2022-01-01 RX ADMIN — OXYCODONE HYDROCHLORIDE 5 MG: 5 TABLET ORAL at 04:25

## 2022-01-01 RX ADMIN — APIXABAN 2.5 MG: 2.5 TABLET, FILM COATED ORAL at 17:47

## 2022-01-01 RX ADMIN — INSULIN LISPRO 1 UNITS: 100 INJECTION, SOLUTION INTRAVENOUS; SUBCUTANEOUS at 08:00

## 2022-01-01 RX ADMIN — APIXABAN 2.5 MG: 2.5 TABLET, FILM COATED ORAL at 18:12

## 2022-01-01 RX ADMIN — SODIUM CHLORIDE 1000 ML: 0.9 INJECTION, SOLUTION INTRAVENOUS at 16:02

## 2022-01-01 RX ADMIN — APIXABAN 2.5 MG: 2.5 TABLET, FILM COATED ORAL at 08:00

## 2022-01-01 RX ADMIN — CARVEDILOL 3.12 MG: 3.12 TABLET, FILM COATED ORAL at 17:36

## 2022-01-01 RX ADMIN — FAMOTIDINE 20 MG: 20 TABLET ORAL at 17:17

## 2022-01-01 RX ADMIN — INSULIN LISPRO 1 UNITS: 100 INJECTION, SOLUTION INTRAVENOUS; SUBCUTANEOUS at 12:02

## 2022-01-01 RX ADMIN — SODIUM CHLORIDE 100 ML/HR: 0.9 INJECTION, SOLUTION INTRAVENOUS at 10:09

## 2022-01-01 RX ADMIN — ASPIRIN 81 MG: 81 TABLET ORAL at 09:13

## 2022-01-01 RX ADMIN — AMLODIPINE BESYLATE 5 MG: 5 TABLET ORAL at 08:24

## 2022-01-01 RX ADMIN — ATORVASTATIN CALCIUM 80 MG: 80 TABLET, FILM COATED ORAL at 09:58

## 2022-01-01 RX ADMIN — FAMOTIDINE 20 MG: 20 TABLET ORAL at 17:00

## 2022-01-01 RX ADMIN — CARVEDILOL 6.25 MG: 3.12 TABLET, FILM COATED ORAL at 08:24

## 2022-01-01 RX ADMIN — PAROXETINE HYDROCHLORIDE 40 MG: 20 TABLET, FILM COATED ORAL at 09:00

## 2022-01-01 RX ADMIN — HYDRALAZINE HYDROCHLORIDE 10 MG: 20 INJECTION, SOLUTION INTRAMUSCULAR; INTRAVENOUS at 22:42

## 2022-01-01 RX ADMIN — CALCITRIOL 0.25 MCG: 0.25 CAPSULE ORAL at 08:33

## 2022-01-01 RX ADMIN — CARVEDILOL 6.25 MG: 3.12 TABLET, FILM COATED ORAL at 16:16

## 2022-01-01 RX ADMIN — HEPARIN SODIUM 5600 UNITS: 1000 INJECTION INTRAVENOUS; SUBCUTANEOUS at 18:02

## 2022-01-01 RX ADMIN — AMLODIPINE BESYLATE 5 MG: 5 TABLET ORAL at 05:08

## 2022-01-01 RX ADMIN — ACETAMINOPHEN 975 MG: 325 TABLET ORAL at 05:44

## 2022-01-01 RX ADMIN — PAROXETINE HYDROCHLORIDE 40 MG: 20 TABLET, FILM COATED ORAL at 08:37

## 2022-01-01 RX ADMIN — PANTOPRAZOLE SODIUM 40 MG: 40 TABLET, DELAYED RELEASE ORAL at 05:44

## 2022-01-01 RX ADMIN — APIXABAN 2.5 MG: 2.5 TABLET, FILM COATED ORAL at 17:06

## 2022-01-01 RX ADMIN — INSULIN LISPRO 1 UNITS: 100 INJECTION, SOLUTION INTRAVENOUS; SUBCUTANEOUS at 12:36

## 2022-01-01 RX ADMIN — SODIUM CHLORIDE 100 ML/HR: 0.9 INJECTION, SOLUTION INTRAVENOUS at 00:18

## 2022-01-01 RX ADMIN — AMLODIPINE BESYLATE 10 MG: 10 TABLET ORAL at 09:00

## 2022-01-01 RX ADMIN — INSULIN LISPRO 1 UNITS: 100 INJECTION, SOLUTION INTRAVENOUS; SUBCUTANEOUS at 16:10

## 2022-01-01 RX ADMIN — APIXABAN 2.5 MG: 2.5 TABLET, FILM COATED ORAL at 11:01

## 2022-01-01 RX ADMIN — INSULIN LISPRO 4 UNITS: 100 INJECTION, SOLUTION INTRAVENOUS; SUBCUTANEOUS at 13:32

## 2022-01-01 RX ADMIN — ATORVASTATIN CALCIUM 80 MG: 80 TABLET, FILM COATED ORAL at 08:49

## 2022-01-01 RX ADMIN — INSULIN LISPRO 2 UNITS: 100 INJECTION, SOLUTION INTRAVENOUS; SUBCUTANEOUS at 17:40

## 2022-01-01 RX ADMIN — APIXABAN 2.5 MG: 2.5 TABLET, FILM COATED ORAL at 08:34

## 2022-01-01 RX ADMIN — CARVEDILOL 6.25 MG: 3.12 TABLET, FILM COATED ORAL at 08:49

## 2022-01-01 RX ADMIN — FAMOTIDINE 20 MG: 20 TABLET ORAL at 08:24

## 2022-01-01 RX ADMIN — CALCITRIOL 0.25 MCG: 0.25 CAPSULE ORAL at 09:26

## 2022-01-01 RX ADMIN — SODIUM CHLORIDE, SODIUM GLUCONATE, SODIUM ACETATE, POTASSIUM CHLORIDE AND MAGNESIUM CHLORIDE 250 ML: 526; 502; 368; 37; 30 INJECTION, SOLUTION INTRAVENOUS at 11:56

## 2022-01-01 RX ADMIN — ACETAMINOPHEN 650 MG: 325 TABLET ORAL at 08:38

## 2022-01-01 RX ADMIN — Medication 5000 UNITS: at 09:26

## 2022-01-01 RX ADMIN — SODIUM CHLORIDE 75 ML/HR: 0.9 INJECTION, SOLUTION INTRAVENOUS at 18:15

## 2022-01-01 RX ADMIN — CARVEDILOL 6.25 MG: 3.12 TABLET, FILM COATED ORAL at 07:59

## 2022-01-01 RX ADMIN — LABETALOL HYDROCHLORIDE 10 MG: 5 INJECTION, SOLUTION INTRAVENOUS at 17:20

## 2022-01-01 RX ADMIN — INSULIN LISPRO 1 UNITS: 100 INJECTION, SOLUTION INTRAVENOUS; SUBCUTANEOUS at 09:27

## 2022-01-01 RX ADMIN — FAMOTIDINE 20 MG: 20 TABLET ORAL at 11:02

## 2022-01-01 RX ADMIN — Medication 5000 UNITS: at 08:49

## 2022-01-01 RX ADMIN — IOHEXOL 64 ML: 350 INJECTION, SOLUTION INTRAVENOUS at 17:27

## 2022-01-01 RX ADMIN — HEPARIN SODIUM 18 UNITS/KG/HR: 10000 INJECTION, SOLUTION INTRAVENOUS at 18:44

## 2022-01-01 RX ADMIN — INSULIN LISPRO 1 UNITS: 100 INJECTION, SOLUTION INTRAVENOUS; SUBCUTANEOUS at 17:41

## 2022-01-01 RX ADMIN — ATORVASTATIN CALCIUM 80 MG: 80 TABLET, FILM COATED ORAL at 08:36

## 2022-01-01 RX ADMIN — ATORVASTATIN CALCIUM 80 MG: 80 TABLET, FILM COATED ORAL at 08:24

## 2022-01-01 RX ADMIN — PAROXETINE HYDROCHLORIDE 40 MG: 20 TABLET, FILM COATED ORAL at 19:37

## 2022-01-01 RX ADMIN — PAROXETINE HYDROCHLORIDE 40 MG: 20 TABLET, FILM COATED ORAL at 22:51

## 2022-01-01 RX ADMIN — APIXABAN 2.5 MG: 2.5 TABLET, FILM COATED ORAL at 17:40

## 2022-01-01 RX ADMIN — FAMOTIDINE 20 MG: 20 TABLET ORAL at 08:33

## 2022-01-01 RX ADMIN — ATENOLOL 100 MG: 25 TABLET ORAL at 09:00

## 2022-01-01 RX ADMIN — Medication 5000 UNITS: at 08:34

## 2022-01-01 RX ADMIN — PAROXETINE HYDROCHLORIDE 40 MG: 20 TABLET, FILM COATED ORAL at 20:32

## 2022-01-01 RX ADMIN — LORAZEPAM 0.5 MG: 2 INJECTION INTRAMUSCULAR; INTRAVENOUS at 17:47

## 2022-01-01 RX ADMIN — CARVEDILOL 6.25 MG: 3.12 TABLET, FILM COATED ORAL at 16:08

## 2022-01-01 RX ADMIN — INSULIN LISPRO 1 UNITS: 100 INJECTION, SOLUTION INTRAVENOUS; SUBCUTANEOUS at 08:40

## 2022-01-01 RX ADMIN — ONDANSETRON 4 MG: 2 INJECTION INTRAMUSCULAR; INTRAVENOUS at 23:20

## 2022-01-01 RX ADMIN — PAROXETINE HYDROCHLORIDE 40 MG: 20 TABLET, FILM COATED ORAL at 19:57

## 2022-01-01 RX ADMIN — INSULIN LISPRO 1 UNITS: 100 INJECTION, SOLUTION INTRAVENOUS; SUBCUTANEOUS at 11:57

## 2022-01-01 RX ADMIN — ATORVASTATIN CALCIUM 80 MG: 80 TABLET, FILM COATED ORAL at 11:02

## 2022-01-01 RX ADMIN — FAMOTIDINE 20 MG: 20 TABLET ORAL at 18:12

## 2022-01-01 RX ADMIN — INSULIN LISPRO 1 UNITS: 100 INJECTION, SOLUTION INTRAVENOUS; SUBCUTANEOUS at 18:47

## 2022-01-01 RX ADMIN — APIXABAN 2.5 MG: 2.5 TABLET, FILM COATED ORAL at 17:17

## 2022-01-01 RX ADMIN — ATORVASTATIN CALCIUM 80 MG: 80 TABLET, FILM COATED ORAL at 09:26

## 2022-01-01 RX ADMIN — GADOBUTROL 7.5 ML: 604.72 INJECTION INTRAVENOUS at 17:50

## 2022-01-01 RX ADMIN — INSULIN LISPRO 1 UNITS: 100 INJECTION, SOLUTION INTRAVENOUS; SUBCUTANEOUS at 22:11

## 2022-01-01 RX ADMIN — Medication 500 MG: at 09:00

## 2022-01-01 RX ADMIN — FAMOTIDINE 20 MG: 20 TABLET ORAL at 17:18

## 2022-01-01 RX ADMIN — CARVEDILOL 3.12 MG: 3.12 TABLET, FILM COATED ORAL at 17:17

## 2022-01-01 RX ADMIN — CARVEDILOL 6.25 MG: 3.12 TABLET, FILM COATED ORAL at 08:34

## 2022-01-01 RX ADMIN — FAMOTIDINE 20 MG: 20 TABLET ORAL at 17:20

## 2022-01-01 RX ADMIN — INSULIN LISPRO 1 UNITS: 100 INJECTION, SOLUTION INTRAVENOUS; SUBCUTANEOUS at 11:50

## 2022-01-01 RX ADMIN — APIXABAN 2.5 MG: 2.5 TABLET, FILM COATED ORAL at 09:26

## 2022-01-01 RX ADMIN — PAROXETINE HYDROCHLORIDE 40 MG: 20 TABLET, FILM COATED ORAL at 19:54

## 2022-01-01 RX ADMIN — APIXABAN 2.5 MG: 2.5 TABLET, FILM COATED ORAL at 09:58

## 2022-01-01 RX ADMIN — CARVEDILOL 6.25 MG: 3.12 TABLET, FILM COATED ORAL at 16:59

## 2022-01-01 RX ADMIN — FENTANYL CITRATE 100 MCG: 50 INJECTION INTRAMUSCULAR; INTRAVENOUS at 15:21

## 2022-01-01 RX ADMIN — INSULIN LISPRO 1 UNITS: 100 INJECTION, SOLUTION INTRAVENOUS; SUBCUTANEOUS at 07:19

## 2022-01-01 RX ADMIN — CALCITRIOL 0.25 MCG: 0.25 CAPSULE ORAL at 11:01

## 2022-01-01 RX ADMIN — ONDANSETRON 4 MG: 2 INJECTION INTRAMUSCULAR; INTRAVENOUS at 06:32

## 2022-01-01 RX ADMIN — APIXABAN 2.5 MG: 2.5 TABLET, FILM COATED ORAL at 11:58

## 2022-01-01 RX ADMIN — Medication 5000 UNITS: at 09:00

## 2022-01-01 RX ADMIN — FAMOTIDINE 20 MG: 20 TABLET ORAL at 17:47

## 2022-01-01 RX ADMIN — INSULIN LISPRO 3 UNITS: 100 INJECTION, SOLUTION INTRAVENOUS; SUBCUTANEOUS at 07:29

## 2022-01-01 RX ADMIN — CALCITRIOL 0.25 MCG: 0.25 CAPSULE ORAL at 08:35

## 2022-01-01 RX ADMIN — ACETAMINOPHEN 975 MG: 325 TABLET ORAL at 22:11

## 2022-01-01 RX ADMIN — FENTANYL CITRATE 50 MCG: 50 INJECTION INTRAMUSCULAR; INTRAVENOUS at 13:15

## 2022-01-01 RX ADMIN — LORAZEPAM 0.5 MG: 2 INJECTION INTRAMUSCULAR; INTRAVENOUS at 00:17

## 2022-01-01 RX ADMIN — AMLODIPINE BESYLATE 5 MG: 5 TABLET ORAL at 16:34

## 2022-01-01 RX ADMIN — ONDANSETRON 4 MG: 2 INJECTION INTRAMUSCULAR; INTRAVENOUS at 20:39

## 2022-01-01 RX ADMIN — SODIUM CHLORIDE 1000 ML: 0.9 INJECTION, SOLUTION INTRAVENOUS at 13:14

## 2022-01-01 RX ADMIN — SODIUM CHLORIDE, SODIUM GLUCONATE, SODIUM ACETATE, POTASSIUM CHLORIDE AND MAGNESIUM CHLORIDE 250 ML: 526; 502; 368; 37; 30 INJECTION, SOLUTION INTRAVENOUS at 12:46

## 2022-01-01 RX ADMIN — CARVEDILOL 6.25 MG: 3.12 TABLET, FILM COATED ORAL at 17:17

## 2022-01-01 RX ADMIN — INSULIN LISPRO 1 UNITS: 100 INJECTION, SOLUTION INTRAVENOUS; SUBCUTANEOUS at 08:23

## 2022-01-01 RX ADMIN — HEPARIN SODIUM 18 UNITS/KG/HR: 10000 INJECTION, SOLUTION INTRAVENOUS at 19:38

## 2022-01-01 RX ADMIN — FAMOTIDINE 20 MG: 20 TABLET ORAL at 09:58

## 2022-01-01 RX ADMIN — ANASTROZOLE 1 MG: 1 TABLET, COATED ORAL at 09:00

## 2022-01-01 RX ADMIN — INSULIN LISPRO 5 UNITS: 100 INJECTION, SOLUTION INTRAVENOUS; SUBCUTANEOUS at 17:19

## 2022-01-01 RX ADMIN — FAMOTIDINE 20 MG: 20 TABLET ORAL at 17:06

## 2022-01-01 RX ADMIN — ATORVASTATIN CALCIUM 80 MG: 80 TABLET, FILM COATED ORAL at 09:00

## 2022-01-01 RX ADMIN — CALCITRIOL 0.25 MCG: 0.25 CAPSULE ORAL at 08:00

## 2022-01-01 RX ADMIN — INSULIN LISPRO 2 UNITS: 100 INJECTION, SOLUTION INTRAVENOUS; SUBCUTANEOUS at 17:29

## 2022-01-01 RX ADMIN — APIXABAN 2.5 MG: 2.5 TABLET, FILM COATED ORAL at 17:20

## 2022-01-01 RX ADMIN — FAMOTIDINE 20 MG: 20 TABLET ORAL at 17:36

## 2022-01-01 RX ADMIN — INSULIN LISPRO 1 UNITS: 100 INJECTION, SOLUTION INTRAVENOUS; SUBCUTANEOUS at 17:21

## 2022-01-01 RX ADMIN — SODIUM CHLORIDE 100 ML/HR: 0.9 INJECTION, SOLUTION INTRAVENOUS at 07:44

## 2022-01-01 RX ADMIN — ACETAMINOPHEN 650 MG: 325 TABLET ORAL at 17:08

## 2022-01-01 RX ADMIN — LABETALOL HYDROCHLORIDE 10 MG: 5 INJECTION, SOLUTION INTRAVENOUS at 14:39

## 2022-01-01 RX ADMIN — APIXABAN 2.5 MG: 2.5 TABLET, FILM COATED ORAL at 17:18

## 2022-01-01 RX ADMIN — INSULIN LISPRO 2 UNITS: 100 INJECTION, SOLUTION INTRAVENOUS; SUBCUTANEOUS at 08:34

## 2022-01-01 RX ADMIN — FAMOTIDINE 20 MG: 20 TABLET ORAL at 08:49

## 2022-01-01 RX ADMIN — OXYCODONE HYDROCHLORIDE 5 MG: 5 TABLET ORAL at 21:39

## 2022-01-01 RX ADMIN — INSULIN LISPRO 1 UNITS: 100 INJECTION, SOLUTION INTRAVENOUS; SUBCUTANEOUS at 17:00

## 2022-01-01 RX ADMIN — LABETALOL HYDROCHLORIDE 10 MG: 5 INJECTION, SOLUTION INTRAVENOUS at 08:34

## 2022-01-01 RX ADMIN — CALCITRIOL 0.25 MCG: 0.25 CAPSULE ORAL at 08:34

## 2022-01-01 RX ADMIN — CARVEDILOL 3.12 MG: 3.12 TABLET, FILM COATED ORAL at 09:26

## 2022-01-01 RX ADMIN — CARVEDILOL 6.25 MG: 3.12 TABLET, FILM COATED ORAL at 17:06

## 2022-01-01 RX ADMIN — INSULIN LISPRO 4 UNITS: 100 INJECTION, SOLUTION INTRAVENOUS; SUBCUTANEOUS at 11:02

## 2022-01-01 RX ADMIN — FAMOTIDINE 20 MG: 20 TABLET ORAL at 09:26

## 2022-01-01 RX ADMIN — ACETAMINOPHEN 650 MG: 325 TABLET ORAL at 09:27

## 2022-01-01 RX ADMIN — INSULIN LISPRO 2 UNITS: 100 INJECTION, SOLUTION INTRAVENOUS; SUBCUTANEOUS at 16:16

## 2022-01-01 RX ADMIN — FAMOTIDINE 20 MG: 20 TABLET ORAL at 08:35

## 2022-01-01 RX ADMIN — CARVEDILOL 3.12 MG: 3.12 TABLET, FILM COATED ORAL at 07:11

## 2022-01-01 RX ADMIN — AMLODIPINE BESYLATE 5 MG: 5 TABLET ORAL at 08:00

## 2022-01-01 RX ADMIN — ATORVASTATIN CALCIUM 80 MG: 80 TABLET, FILM COATED ORAL at 08:00

## 2022-01-01 RX ADMIN — FAMOTIDINE 20 MG: 20 TABLET ORAL at 17:40

## 2022-01-01 RX ADMIN — APIXABAN 2.5 MG: 2.5 TABLET, FILM COATED ORAL at 08:35

## 2022-01-01 RX ADMIN — CALCITRIOL 0.25 MCG: 0.25 CAPSULE ORAL at 09:00

## 2022-01-01 RX ADMIN — AMLODIPINE BESYLATE 5 MG: 5 TABLET ORAL at 08:33

## 2022-01-01 RX ADMIN — CALCITRIOL 0.25 MCG: 0.25 CAPSULE ORAL at 08:24

## 2022-01-01 RX ADMIN — LORAZEPAM 0.5 MG: 0.5 TABLET ORAL at 23:54

## 2022-01-01 RX ADMIN — APIXABAN 2.5 MG: 2.5 TABLET, FILM COATED ORAL at 08:24

## 2022-01-01 RX ADMIN — INSULIN LISPRO 3 UNITS: 100 INJECTION, SOLUTION INTRAVENOUS; SUBCUTANEOUS at 12:25

## 2022-01-01 RX ADMIN — INSULIN LISPRO 2 UNITS: 100 INJECTION, SOLUTION INTRAVENOUS; SUBCUTANEOUS at 13:01

## 2022-01-01 RX ADMIN — FAMOTIDINE 20 MG: 20 TABLET ORAL at 08:00

## 2022-01-01 RX ADMIN — CALCITRIOL 0.25 MCG: 0.25 CAPSULE ORAL at 08:49

## 2022-01-01 RX ADMIN — APIXABAN 2.5 MG: 2.5 TABLET, FILM COATED ORAL at 17:00

## 2022-01-01 RX ADMIN — Medication 5000 UNITS: at 08:00

## 2022-01-01 RX ADMIN — LABETALOL HYDROCHLORIDE 10 MG: 5 INJECTION, SOLUTION INTRAVENOUS at 22:11

## 2022-01-01 RX ADMIN — PAROXETINE HYDROCHLORIDE 40 MG: 20 TABLET, FILM COATED ORAL at 09:26

## 2022-01-01 RX ADMIN — CALCITRIOL 0.25 MCG: 0.25 CAPSULE ORAL at 09:58

## 2022-01-01 RX ADMIN — ACETAMINOPHEN 975 MG: 325 TABLET ORAL at 15:26

## 2022-01-01 RX ADMIN — AMLODIPINE BESYLATE 5 MG: 5 TABLET ORAL at 08:34

## 2022-01-01 RX ADMIN — PAROXETINE HYDROCHLORIDE 40 MG: 20 TABLET, FILM COATED ORAL at 19:59

## 2022-01-01 RX ADMIN — APIXABAN 2.5 MG: 2.5 TABLET, FILM COATED ORAL at 17:36

## 2022-01-01 RX ADMIN — OXYCODONE HYDROCHLORIDE 5 MG: 5 TABLET ORAL at 22:11

## 2022-01-01 RX ADMIN — CARVEDILOL 6.25 MG: 3.12 TABLET, FILM COATED ORAL at 17:20

## 2022-01-01 RX ADMIN — INSULIN LISPRO 1 UNITS: 100 INJECTION, SOLUTION INTRAVENOUS; SUBCUTANEOUS at 12:22

## 2022-01-01 RX ADMIN — APIXABAN 2.5 MG: 2.5 TABLET, FILM COATED ORAL at 08:49

## 2022-01-01 RX ADMIN — LABETALOL HYDROCHLORIDE 10 MG: 5 INJECTION, SOLUTION INTRAVENOUS at 07:11

## 2022-01-01 RX ADMIN — OXYCODONE HYDROCHLORIDE 5 MG: 5 TABLET ORAL at 18:58

## 2022-02-13 PROBLEM — R55 SYNCOPE: Status: ACTIVE | Noted: 2022-01-01

## 2022-02-13 PROBLEM — N18.2 TYPE 2 DIABETES MELLITUS WITH STAGE 2 CHRONIC KIDNEY DISEASE AND HYPERTENSION (HCC): Status: ACTIVE | Noted: 2020-11-24

## 2022-02-13 PROBLEM — I12.9 TYPE 2 DIABETES MELLITUS WITH STAGE 2 CHRONIC KIDNEY DISEASE AND HYPERTENSION (HCC): Status: ACTIVE | Noted: 2020-11-24

## 2022-02-13 PROBLEM — S42.301A FRACTURE OF HUMERAL SHAFT, RIGHT, CLOSED: Status: ACTIVE | Noted: 2022-01-01

## 2022-02-13 PROBLEM — E11.22 TYPE 2 DIABETES MELLITUS WITH STAGE 2 CHRONIC KIDNEY DISEASE AND HYPERTENSION (HCC): Status: ACTIVE | Noted: 2020-11-24

## 2022-02-13 NOTE — ASSESSMENT & PLAN NOTE
· Patient's daughter reports her mother fell this morning in the kitchen, possible LOC, and per daughter, patient hit her head  H/o breast cancer, completed chemotherapy  Recently scheduled to have MRI this Wednesday here at [facility] for syncopal episodes, increased confusion, to investigate possible mets  H/o DVT, and reports compliance with Eliquis, and all other medications  · Came in via trauma evaluation  · CT head negative intracranial hemorrhage   · XR shoulder and right humerus reveal displaced, comminuted mid right humeral shaft fracture  · ED physician discussed case with Dr Ana Tejada  Likely inoperable  Coaptation splint placed by ED, with repeat XR (pending)  · Formal ortho evaluation appreciated  Will place consult  · DD 2 03   · ECHO ordered   · CTA pending - considering elevated DD, h/o DVT, and h/o cancer   · Discussed with nephro  OK to give contrast for CTA  Will give 500 cc NS bolus after CTA completed  · Also approved by radiologist Dr Cassy Kennedy  · EKG: NSR, 64  · Telemetry  · Check orthostatics  · VTE/PE protocol - started on heparin gtt (Eliquis on hold)  · Hold off on MRI brain right now   Discussed with nephrology, and given patient's low GFR, gadolinium should be avoided, particularly with concurrent CTA with contrast

## 2022-02-13 NOTE — ASSESSMENT & PLAN NOTE
Lab Results   Component Value Date    HGBA1C 7 7 (H) 12/18/2020       No results for input(s): POCGLU in the last 72 hours      Blood Sugar Average: Last 72 hrs:     · Hold home oral hypoglycemic meds  · SSI with accu checks

## 2022-02-13 NOTE — ASSESSMENT & PLAN NOTE
· Patient had unwitnessed fall earlier today with possible syncopal episode, per daughter, who responded shortly after she heard her mom fall to the floor  · XR right shoulder and humerus reveal displaced, comminuted mid right humeral shaft fracture  · ED physician discussed with Ortho on call, and [facility] does not have equipment to fix surgically  Patient will be admitted here after coaptation splint applied, and have formal orthopedic evaluation  · Coaptation splint in place  · Ortho consult appreciated

## 2022-02-13 NOTE — ED PROVIDER NOTES
Emergency Department Trauma Note  Christen Church 76 y o  female MRN: [id number]  Unit/Bed#: ED 24/ED 24 Encounter: [id number]      Trauma Alert: Trauma Acuity: Trauma Evaluation  Model of Arrival: Mode of Arrival: BLS via    Trauma Team: Current Providers  Attending Provider: Laura Pearce MD  Attending Provider: Griselda Pouch, MD  Registered Nurse: Mohan Rocha RN  Consultants:     None      History of Present Illness     Chief Complaint:   Chief Complaint   Patient presents with    Dizziness     HPI:  Christen Church is a 76 y o  female who presents with dizziness and syncopal episode at home  Patient states she was standing at the kitchen counter but this sink and felt dizzy and lower herself to the floor  Patient states she did not hit her head or loss consciousness  She states she was trying to protect herself when she twisted her arm and felt immediate pop  Complaining of right arm pain  She denies any chest pain shortness of breath  Denies any abdominal pain nausea vomiting or diarrhea  Awake alert oriented x3 GCS 15  Mechanism:           HPI  Review of Systems   Constitutional: Negative  HENT: Negative  Respiratory: Negative  Cardiovascular: Negative  Gastrointestinal: Negative  Endocrine: Negative  Genitourinary: Negative  Musculoskeletal:        Right arm pain    Skin: Negative  Allergic/Immunologic: Negative  Neurological: Positive for syncope  Negative for weakness  Psychiatric/Behavioral: Negative          Historical Information     Immunizations:   Immunization History   Administered Date(s) Administered    COVID-19 PFIZER VACCINE 0 3 ML IM 03/24/2021, 04/17/2021, 12/11/2021    INFLUENZA 10/24/2014, 09/30/2015, 10/13/2016, 10/02/2017, 10/24/2017, 10/01/2018, 10/03/2019    Pneumococcal Conjugate 13-Valent 09/30/2015    Pneumococcal Polysaccharide PPV23 05/28/2014, 10/01/2018    Tdap 12/12/2012    Zoster 12/02/2014       Past Medical History:   Diagnosis Date    Anxiety     Anxiety with depression     Breast cancer [facility])     2019 - right sided    Breast cancer ([address]     Depression     Diabetes mellitus ([address]     Hypertension     Port-A-Cath in place     Left    TIA (transient ischemic attack)     only a possible diagnosis    TIA (transient ischemic attack)     "possible"       Family History   Problem Relation Age of Onset    No Known Problems Mother      Past Surgical History:   Procedure Laterality Date    AXILLARY NODE DISSECTION Bilateral     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    AXILLARY NODE DISSECTION      CHOLECYSTECTOMY      FOOT SURGERY      IR PORT PLACEMENT      SIMPLE MASTECTOMY Right     TONSILLECTOMY      TONSILLECTOMY AND ADENOIDECTOMY      WISDOM TOOTH EXTRACTION      WRIST SURGERY Right      Social History     Tobacco Use    Smoking status: Former Smoker     Packs/day: 0 25     Years: 62 00     Pack years: 15 50     Types: Cigarettes     Start date:      Quit date: 10/25/2020     Years since quittin 3    Smokeless tobacco: Never Used    Tobacco comment: stopped smoking 4 weeks ago   Vaping Use    Vaping Use: Former    Start date: 1/3/1958   [facility] Quit date: 2020   Substance Use Topics    Alcohol use: Never    Drug use: Never     E-Cigarette/Vaping    E-Cigarette Use Former User     Start Date 1/3/1958    [facility] Quit Date 20      E-Cigarette/Vaping Substances    Nicotine No     THC No     CBD No     Flavoring No     Other No     Unknown No        Family History: non-contributory    Meds/Allergies   Prior to Admission Medications   Prescriptions Last Dose Informant Patient Reported? Taking?    Cholecalciferol (Vitamin D3) 1 25 MG (15756 UT) TABS   Yes No   Sig: Take 1 tablet by mouth Take one tablet 3 days per week   LINAGLIPTIN PO  Child Yes No   Sig: Take 5 mg by mouth   PARoxetine (PAXIL) 40 MG tablet  Child Yes No   Sig: Take by mouth   amLODIPine (NORVASC) 10 mg tablet   No No   Sig: Take 1 tablet (10 mg total) by mouth daily   amLODIPine (NORVASC) 5 mg tablet  Child Yes No   Sig: Take 5 mg by mouth daily   anastrozole (ARIMIDEX) 1 mg tablet   Yes No   Sig: Take 1 mg by mouth daily   apixaban (ELIQUIS) 5 mg  Child No No   Sig: Take 1 tablet (5 mg total) by mouth 2 (two) times a day   apixaban (Eliquis) 2 5 mg   Yes No   Sig: Take by mouth 2 (two) times a day   aspirin (ECOTRIN LOW STRENGTH) 81 mg EC tablet  Child Yes No   Sig: Take 81 mg by mouth daily   atenolol (TENORMIN) 100 mg tablet  Child Yes No   Sig: Take by mouth   atorvastatin (LIPITOR) 80 mg tablet  Child Yes No   Sig: Take 80 mg by mouth daily   atorvastatin (LIPITOR) 80 mg tablet   Yes No   Sig: Take 80 mg by mouth daily   calcitriol (ROCALTROL) 0 25 mcg capsule   Yes No   Sig: Take 0 25 mcg by mouth daily   cholecalciferol (VITAMIN D3) 1,000 units tablet  Child Yes No   Sig: Take 5,000 Units by mouth 3 (three) times a week   famotidine (PEPCID) 20 mg tablet  Child Yes No   Sig: Take 20 mg by mouth 2 (two) times a day   magnesium gluconate (MAGONATE) 500 mg tablet   Yes No   Sig: Take 500 mg by mouth daily   omeprazole (PriLOSEC) 40 MG capsule  Child Yes No   Sig: Take 40 mg by mouth daily   repaglinide (PRANDIN) 1 mg tablet  Child Yes No   Sig: TAKE 1 TAB BY MOUTH THREE TIMES A DAY BEFORE MEALS    varenicline (CHANTIX CAMRON) 0 5 MG X 11 & 1 MG X 42 tablet  Child No No   Sig: Take one 0 5mg tab by mouth 1x daily for 3 days, then increase to one 0 5mg tab 2x daily for 3 days, then increase to one 1mg tab 2x daily   Patient not taking: Reported on 11/23/2020      Facility-Administered Medications: None       No Known Allergies    PHYSICAL EXAM    PE limited by:     Objective   Vitals:   First set: Temperature: 98 °F (36 7 °C) (02/13/22 1550)  Pulse: 64 (02/13/22 1315)  Respirations: 18 (02/13/22 1315)  Blood Pressure: 123/68 (02/13/22 1315)  SpO2: 95 % (02/13/22 1315)    Primary Survey:   (A) Airway: patent   (B) Breathing:  Lungs are clear to auscultation bilateral  (C) Circulation: Pulses:   normal  (D) Disabliity:  GCS Total:  15  (E) Expose:  Completed    Secondary Survey: (Click on Physical Exam tab above)  Physical Exam  Vitals and nursing note reviewed  Constitutional:       Appearance: Normal appearance  HENT:      Head: Normocephalic and atraumatic  Nose: Nose normal    Eyes:      Extraocular Movements: Extraocular movements intact  Conjunctiva/sclera: Conjunctivae normal       Pupils: Pupils are equal, round, and reactive to light  Cardiovascular:      Rate and Rhythm: Normal rate and regular rhythm  Abdominal:      General: Bowel sounds are normal       Palpations: Abdomen is soft  Tenderness: There is no abdominal tenderness  Musculoskeletal:         General: Tenderness and deformity (right arm : obvious deformity , tender to palpation ROM decrease ) present  Cervical back: Normal range of motion and neck supple  Skin:     General: Skin is warm  Neurological:      General: No focal deficit present  Mental Status: She is alert and oriented to person, place, and time  Cervical spine cleared by clinical criteria?  Yes     Invasive Devices  Report    Peripheral Intravenous Line            Peripheral IV 02/13/22 Left Antecubital <1 day                Lab Results:   Results Reviewed     Procedure Component Value Units Date/Time    CBC [[id number]]     Lab Status: No result Specimen: Blood     APTT [[id number]]     Lab Status: No result Specimen: Blood     Protime-INR [[id number]]     Lab Status: No result Specimen: Blood     HS Troponin I 2hr [[id number]]  (Normal) Collected: 02/13/22 1454    Lab Status: Final result Specimen: Blood from Arm, Left Updated: 02/13/22 1530     hs TnI 2hr 4 ng/L      Delta 2hr hsTnI -1 ng/L     APTT [[id number]]  (Normal) Collected: 02/13/22 1454    Lab Status: Final result Specimen: Blood from Arm, Left Updated: 02/13/22 1516     PTT 31 seconds     Protime-INR [[id number]] (Abnormal) Collected: 02/13/22 1454    Lab Status: Final result Specimen: Blood from Arm, Left Updated: 02/13/22 1516     Protime 15 4 seconds      INR 1 24    HS Troponin I 4hr [[id number]]     Lab Status: No result Specimen: Blood     APTT [[id number]]     Lab Status: No result Specimen: Blood     HS Troponin 0hr (reflex protocol) [[id number]]  (Normal) Collected: 02/13/22 1311    Lab Status: Final result Specimen: Blood from Arm, Left Updated: 02/13/22 1359     hs TnI 0hr 5 ng/L     Basic metabolic panel [[id number]]  (Abnormal) Collected: 02/13/22 1311    Lab Status: Final result Specimen: Blood from Arm, Left Updated: 02/13/22 1356     Sodium 137 mmol/L      Potassium 4 7 mmol/L      Chloride 104 mmol/L      CO2 26 mmol/L      ANION GAP 7 mmol/L      BUN 28 mg/dL      Creatinine 1 96 mg/dL      Glucose 216 mg/dL      Calcium 8 3 mg/dL      eGFR 23 ml/min/1 73sq m     Narrative:      [facility] guidelines for Chronic Kidney Disease (CKD):     Stage 1 with normal or high GFR (GFR > 90 mL/min/1 73 square meters)    Stage 2 Mild CKD (GFR = 60-89 mL/min/1 73 square meters)    Stage 3A Moderate CKD (GFR = 45-59 mL/min/1 73 square meters)    Stage 3B Moderate CKD (GFR = 30-44 mL/min/1 73 square meters)    Stage 4 Severe CKD (GFR = 15-29 mL/min/1 73 square meters)    Stage 5 End Stage CKD (GFR <15 mL/min/1 73 square meters)  Note: GFR calculation is accurate only with a steady state creatinine    B-Type Natriuretic Peptide(BNP) CA, GH, EA Camp[facility] Only [[id number]]  (Normal) Collected: 02/13/22 1311    Lab Status: Final result Specimen: Blood from Arm, Left Updated: 02/13/22 1356      pg/mL     D-Dimer [[id number]]  (Abnormal) Collected: 02/13/22 1311    Lab Status: Final result Specimen: Blood from Arm, Left Updated: 02/13/22 1347     D-Dimer, Quant 2 03 ug/ml FEU     Protime-INR [[id number]]  (Abnormal) Collected: 02/13/22 1311    Lab Status: Final result Specimen: Blood from Arm, Left Updated: 02/13/22 1347     Protime 15 4 seconds      INR 1 23    CBC and differential [[id number]]  (Abnormal) Collected: 02/13/22 1311    Lab Status: Final result Specimen: Blood from Arm, Left Updated: 02/13/22 1318     WBC 7 16 Thousand/uL      RBC 3 83 Million/uL      Hemoglobin 10 1 g/dL      Hematocrit 33 5 %      MCV 88 fL      MCH 26 4 pg      MCHC 30 1 g/dL      RDW 15 1 %      MPV 9 1 fL      Platelets 165 Thousands/uL      nRBC 0 /100 WBCs      Neutrophils Relative 71 %      Immat GRANS % 1 %      Lymphocytes Relative 20 %      Monocytes Relative 7 %      Eosinophils Relative 1 %      Basophils Relative 0 %      Neutrophils Absolute 5 11 Thousands/µL      Immature Grans Absolute 0 04 Thousand/uL      Lymphocytes Absolute 1 41 Thousands/µL      Monocytes Absolute 0 49 Thousand/µL      Eosinophils Absolute 0 08 Thousand/µL      Basophils Absolute 0 03 Thousands/µL     UA w Reflex to Microscopic w Reflex to Culture [[id number]]     Lab Status: No result Specimen: Urine                  Imaging Studies:   Direct to CT: No  TRAUMA - CT head wo contrast   Final Result by Tavia Brunson MD (02/13 1516)      No acute intracranial abnormality  Microangiopathic changes  Workstation performed: [id number]         XR shoulder 2+ views RIGHT   Final Result by Tavia Brunson MD (02/13 1444)      Displaced, comminuted mid right humeral shaft fracture  The study was marked in [facility] for immediate notification  Workstation performed: [id number]         XR humerus RIGHT   Final Result by Tavia Brunson MD (02/13 1444)      Displaced, comminuted mid right humeral shaft fracture  The study was marked in [facility] for immediate notification        Workstation performed: [id number]         XR humerus RIGHT    (Results Pending)   CTA chest pe study    (Results Pending)   XR humerus RIGHT    (Results Pending)         Procedures  Procedures         ED Course  ED Course as of 02/13/22 2018 [facility] Feb 13, 2022   1421 Daughter arrived at the scene provided with more history (1400) and stated that patient had a syncopal episode at home and had up possible head strike  She states she has been confused with history  Trauma eval was called  [address] [place] [facility]  [facility]  I just had a discussion with my trauma rep  We do not have any equipment at [place] to fix this type of fracture  [place] , [place]  If the patient would need to be admitted, would recommend transferring to a tertiary center where there's adequate equipment  Thanks   1447 Displaced, comminuted mid right humeral shaft fracture  1622 Case discuss with Trauma @ [facility] [phone] dr Castaneda recommended to speak with Ortho if they recommend transfer they will take them under their service        [id number]: Discuss with Dr Walter Cruz Most of them are non-operative  Looking at imaging  Put her in split, with arm hanging straight to gravity  She doesn't need to be transferred, just need to be put in splint  Ortho doesn't need it transferred  Closed reduction arm hang straight with gravity, can avoid transfer  1623 Patient is placed in coaptation splint and sling   1623 Case discuss with Horacio Lambert will admit    [id number] Pt placed in a coaptation splint with assistance from tech, improved symptoms , NVI   Post reduction showed improved of dislocation    1644 Marked improvement after splint            MDM        Disposition  [facility] Transfer: No  Final diagnoses:   Syncope   Closed comminuted right humeral fracture     Time reflects when diagnosis was documented in both MDM as applicable and the Disposition within this note     Time User Action Codes Description Comment    2/13/2022  3:30 PM Allayne Sames Add [R55] Syncope     2/13/2022  3:31 PM Allayne Sames Add [[id number]] Closed comminuted right humeral fracture       ED Disposition     ED Disposition Condition Date/Time Comment    Admit Hope Owusu Feb 13, 2022  3:30 PM Case was discussed with Linda Graff and the patient's admission status was agreed to be Admission Status: inpatient status to the service of Dr Maris Bynum    Follow-up Information    None       Patient's Medications   Discharge Prescriptions    No medications on file     No discharge procedures on file      PDMP Review       Value Time User    PDMP Reviewed  Yes 9/22/2019  9:52 AM Fco Oliveira          ED Provider  Electronically Signed by         Mando Epps MD  02/13/22 0575

## 2022-02-13 NOTE — H&P
[address] 1945, 76 y o  female MRN: [id number]  Unit/Bed#: ED 24 Encounter: [id number]  Primary Care Provider: Sonia Powell DO   Date and time admitted to hospital: 2/13/2022 12:56 PM    * Syncope  Assessment & Plan  · Patient's daughter reports her mother fell this morning in the kitchen, possible LOC, and per daughter, patient hit her head  H/o breast cancer, completed chemotherapy  Recently scheduled to have MRI this Wednesday here at [facility] for syncopal episodes, increased confusion, to investigate possible mets  H/o DVT, and reports compliance with Eliquis, and all other medications  · Came in via trauma evaluation  · CT head negative intracranial hemorrhage   · XR shoulder and right humerus reveal displaced, comminuted mid right humeral shaft fracture  · ED physician discussed case with Dr Estuardo Steve  Likely inoperable  Coaptation splint placed by ED, with repeat XR (pending)  · Formal ortho evaluation appreciated  Will place consult  · DD 2 03   · ECHO ordered   · CTA pending - considering elevated DD, h/o DVT, and h/o cancer   · Discussed with nephro  OK to give contrast for CTA  Will give 500 cc NS bolus after CTA completed  · Also approved by radiologist Dr Mandy Myers  · EKG: NSR, 64  · Telemetry  · Check orthostatics  · VTE/PE protocol - started on heparin gtt (Eliquis on hold)  · Hold off on MRI brain right now  Discussed with nephrology, and given patient's low GFR, gadolinium should be avoided, particularly with concurrent CTA with contrast      Fracture of humeral shaft, right, closed  Assessment & Plan  · Patient had unwitnessed fall earlier today with possible syncopal episode, per daughter, who responded shortly after she heard her mom fall to the floor  · XR right shoulder and humerus reveal displaced, comminuted mid right humeral shaft fracture  · ED physician discussed with Ortho on call, and [facility] does not have equipment to fix surgically  Patient will be admitted here after coaptation splint applied, and have formal orthopedic evaluation  · Coaptation splint in place  · Ortho consult appreciated  History of DVT (deep vein thrombosis)  Assessment & Plan  · On Eliquis   · Hold for now as patient is on heparin gtt for elevated DD    Type 2 diabetes mellitus with stage 2 chronic kidney disease and hypertension [facility])  Assessment & Plan  Lab Results   Component Value Date    EGFR 23 02/13/2022    EGFR 25 06/08/2021    EGFR 24 12/18/2020    CREATININE 1 96 (H) 02/13/2022    CREATININE 1 90 (H) 06/08/2021    CREATININE 1 99 (H) 12/18/2020     · Baseline Cr around 1 9  · Monitor renal function closely in the setting of IV contrast for CTA   · Discussed with nephrology on call  Will give 500cc NS bolus after CTA completed  · BP well-controlled  · Continue pre-admission amlodipine, atenolol  · Hold home oral hypoglycemic agents   · SSI with accu checks     VTE Pharmacologic Prophylaxis: VTE Score: 14 High Risk (Score >/= 5) - Pharmacological DVT Prophylaxis Ordered: heparin drip  Sequential Compression Devices Ordered  Code Status: Prior Level 1 - Discussed with patient's daughter at the bedside on 2/13/22  Discussion with family: Updated  (daughter) at bedside  Anticipated Length of Stay: Patient will be admitted on an inpatient basis with an anticipated length of stay of greater than 2 midnights secondary to right humeral shaft fracture and work-up for syncope  Total Time for Visit, including Counseling / Coordination of Care: 70 minutes Greater than 50% of this total time spent on direct patient counseling and coordination of care  Chief Complaint: fall/syncope     History of Present Illness:  Gaurav Hernandez is a 76 y o  female with a PMH of breast cancer s/p chemotherapy, DVT, CKD, HTN, DM who presents with fall and pre-syncope/syncope   Patient brought in to the ED for trauma evaluation, and found to have right humeral shaft fracture  Coaptation splint applied in the ED and orthopedic surgery consulted  CT head  Negative for acute intracranial abnormality  Patient also found to have elevated DD  CTA with contrast was approved by nephrologist on call and radiologist  Patient admitted to medicine for further evaluation of syncope and orthopedic evaluation  Please see above assessment/plan for remainder of the details  Review of Systems:  Review of Systems   Constitutional: Negative for chills, fever and unexpected weight change  HENT: Negative for congestion, rhinorrhea and sore throat  Eyes: Negative for visual disturbance  Respiratory: Negative for cough and shortness of breath  Cardiovascular: Negative for chest pain, palpitations and leg swelling  Gastrointestinal: Negative for abdominal pain, blood in stool, diarrhea, nausea and vomiting  Endocrine: Negative for cold intolerance and heat intolerance  Genitourinary: Negative for dysuria, frequency, hematuria and urgency  Musculoskeletal: Negative for arthralgias  Skin: Negative for rash  Allergic/Immunologic: Negative for environmental allergies and food allergies  Neurological: Positive for dizziness, syncope and light-headedness  Negative for headaches  Hematological: Does not bruise/bleed easily  Psychiatric/Behavioral: The patient is not nervous/anxious          Past Medical and Surgical History:   Past Medical History:   Diagnosis Date    Anxiety     Anxiety with depression     Breast cancer [facility])     December 2019 - right sided    Breast [facility] [address] )     Depression     Diabetes mellitus ([facility] [address] )     Hypertension     Port-A-Cath in place     Left    TIA (transient ischemic attack)     only a possible diagnosis    TIA (transient ischemic attack)     "possible"       Past Surgical History:   Procedure Laterality Date    AXILLARY NODE DISSECTION Bilateral     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    AXILLARY NODE DISSECTION      CHOLECYSTECTOMY      FOOT SURGERY      IR PORT PLACEMENT      SIMPLE MASTECTOMY Right     TONSILLECTOMY      TONSILLECTOMY AND ADENOIDECTOMY      WISDOM TOOTH EXTRACTION      WRIST SURGERY Right        Meds/Allergies:  Prior to Admission medications    Medication Sig Start Date End Date Taking?  Authorizing Provider   amLODIPine (NORVASC) 10 mg tablet Take 1 tablet (10 mg total) by mouth daily 11/28/20   Chirag Jang MD   amLODIPine (NORVASC) 5 mg tablet Take 5 mg by mouth daily 6/18/19   Historical Provider, MD   anastrozole (ARIMIDEX) 1 mg tablet Take 1 mg by mouth daily    Historical Provider, MD   apixaban (Eliquis) 2 5 mg Take by mouth 2 (two) times a day    Historical Provider, MD   apixaban (ELIQUIS) 5 mg Take 1 tablet (5 mg total) by mouth 2 (two) times a day 9/22/19   [facility] MARYAN Jones   aspirin (ECOTRIN LOW STRENGTH) 81 mg EC tablet Take 81 mg by mouth daily    Historical Provider, MD   atenolol (TENORMIN) 100 mg tablet Take by mouth    Historical Provider, MD   atorvastatin (LIPITOR) 80 mg tablet Take 80 mg by mouth daily    Historical Provider, MD   atorvastatin (LIPITOR) 80 mg tablet Take 80 mg by mouth daily    Historical Provider, MD   calcitriol (ROCALTROL) 0 25 mcg capsule Take 0 25 mcg by mouth daily    Historical Provider, MD   cholecalciferol (VITAMIN D3) 1,000 units tablet Take 5,000 Units by mouth 3 (three) times a week    Historical Provider, MD   Cholecalciferol (Vitamin D3) 1 25 MG (41256 UT) TABS Take 1 tablet by mouth Take one tablet 3 days per week    Historical Provider, MD   famotidine (PEPCID) 20 mg tablet Take 20 mg by mouth 2 (two) times a day    Historical Provider, MD   LINAGLIPTIN PO Take 5 mg by mouth    Historical Provider, MD   magnesium gluconate (MAGONATE) 500 mg tablet Take 500 mg by mouth daily    Historical Provider, MD   omeprazole (PriLOSEC) 40 MG capsule Take 40 mg by mouth daily    Historical Provider, MD   PARoxetine (PAXIL) 40 MG tablet Take by mouth Historical Provider, MD   repaglinide (PRANDIN) 1 mg tablet TAKE 1 TAB BY MOUTH THREE TIMES A DAY BEFORE MEALS  19   Historical Provider, MD   varenicline (CHANTIX CAMRON) 0 5 MG X 11 & 1 MG X 42 tablet Take one 0 5mg tab by mouth 1x daily for 3 days, then increase to one 0 5mg tab 2x daily for 3 days, then increase to one 1mg tab 2x daily  Patient not taking: Reported on 2020 10/15/19   Rui Ivy MD     I have reviewed home medications using recent Epic encounter  Allergies: No Known Allergies    Social History:  Marital Status: Unknown   Occupation:   Patient Pre-hospital Living Situation: With other family member: daughter  Patient Pre-hospital Level of Mobility: walks  Patient Pre-hospital Diet Restrictions:   Substance Use History:   Social History     Substance and Sexual Activity   Alcohol Use Never     Social History     Tobacco Use   Smoking Status Former Smoker    Packs/day: 0 25    Years: 62 00    Pack years: 15 50    Types: Cigarettes    Start date: 78    Quit date: 10/25/2020    Years since quittin 3   Smokeless Tobacco Never Used   Tobacco Comment    stopped smoking 4 weeks ago     Social History     Substance and Sexual Activity   Drug Use Never       Family History:  Family History   Problem Relation Age of Onset    No Known Problems Mother        Physical Exam:     Vitals:   Blood Pressure: 123/68 (22 1315)  Pulse: 64 (22 1315)  Temperature: 98 °F (36 7 °C) (22 1550)  Respirations: 18 (22 1315)  Weight - Scale: 74 8 kg (165 lb) (22 1550)  SpO2: 95 % (22 1315)    Physical Exam  Vitals and nursing note reviewed  Constitutional:       General: She is not in acute distress  Appearance: She is normal weight  She is not toxic-appearing  HENT:      Head: Normocephalic and atraumatic        Mouth/Throat:      Mouth: Mucous membranes are moist    Eyes:      Conjunctiva/sclera: Conjunctivae normal    Cardiovascular:      Rate and Rhythm: Normal rate and regular rhythm  Pulses: Normal pulses  Heart sounds: Normal heart sounds  Pulmonary:      Effort: Pulmonary effort is normal  No respiratory distress  Breath sounds: Normal breath sounds  No wheezing, rhonchi or rales  Abdominal:      General: Bowel sounds are normal  There is no distension  Palpations: Abdomen is soft  Tenderness: There is no abdominal tenderness  Musculoskeletal:      Cervical back: Neck supple  Right lower leg: No edema  Left lower leg: No edema  Comments: Right upper extremity coaptation splint in place    Skin:     General: Skin is warm and dry  Neurological:      General: No focal deficit present  Mental Status: She is alert and oriented to person, place, and time  Mental status is at baseline  Cranial Nerves: No cranial nerve deficit  Sensory: No sensory deficit  Motor: No weakness  Coordination: Coordination normal    Psychiatric:         Mood and Affect: Mood normal          Behavior: Behavior normal          Thought Content: Thought content normal          Additional Data:     Lab Results:  Results from last 7 days   Lab Units 02/13/22  1311   WBC Thousand/uL 7 16   HEMOGLOBIN g/dL 10 1*   HEMATOCRIT % 33 5*   PLATELETS Thousands/uL 234   NEUTROS PCT % 71   LYMPHS PCT % 20   MONOS PCT % 7   EOS PCT % 1     Results from last 7 days   Lab Units 02/13/22  1311   SODIUM mmol/L 137   POTASSIUM mmol/L 4 7   CHLORIDE mmol/L 104   CO2 mmol/L 26   BUN mg/dL 28*   CREATININE mg/dL 1 96*   ANION GAP mmol/L 7   CALCIUM mg/dL 8 3*   GLUCOSE RANDOM mg/dL 216*     Results from last 7 days   Lab Units 02/13/22  1454   INR  1 24*                   Imaging: Reviewed radiology reports from this admission including: XR right shoulder, XR right humerus  TRAUMA - CT head wo contrast   Final Result by Bijan Ash MD (02/13 1516)      No acute intracranial abnormality  Microangiopathic changes  Workstation performed: [id number]         XR shoulder 2+ views RIGHT   Final Result by Michael Ricketts MD (02/13 1444)      Displaced, comminuted mid right humeral shaft fracture  The study was marked in [facility] for immediate notification  Workstation performed: [id number]         XR humerus RIGHT   Final Result by Michael Ricketts MD (02/13 1444)      Displaced, comminuted mid right humeral shaft fracture  The study was marked in [facility] for immediate notification  Workstation performed: [id number]         XR humerus RIGHT    (Results Pending)   CTA chest pe study    (Results Pending)   XR humerus RIGHT    (Results Pending)       EKG and Other Studies Reviewed on Admission:   · EKG: NSR  HR 64     ** Please Note: This note has been constructed using a voice recognition system   **

## 2022-02-13 NOTE — ASSESSMENT & PLAN NOTE
Lab Results   Component Value Date    EGFR 23 02/13/2022    EGFR 25 06/08/2021    EGFR 24 12/18/2020    CREATININE 1 96 (H) 02/13/2022    CREATININE 1 90 (H) 06/08/2021    CREATININE 1 99 (H) 12/18/2020     · Baseline Cr around 1 9  · Monitor renal function closely in the setting of IV contrast for CTA   · Discussed with nephrology on call  Will give 500cc NS bolus after CTA completed     · BP well-controlled  · Continue pre-admission amlodipine, atenolol  · Hold home oral hypoglycemic agents   · SSI with accu checks

## 2022-02-14 NOTE — ASSESSMENT & PLAN NOTE
· Orthopedic input appreciated  · No acute surgical intervention needed at this time  · Outpatient follow-up in 1 week for repeat imaging and further discussion regarding plan of care depending on results of repeat imaging  · Home with home care  · Pain control as needed

## 2022-02-14 NOTE — CONSULTS
Consultation - Julia Thomson 76 y o  female MRN: [id number]  Unit/Bed#: [facility] 16 Encounter: [id number]      Assessment/Plan     Assessment:  Status post syncopal episode  Reduced right humeral shaft fracture    Plan:  The reduction is greatly improved and is nearly anatomic  At this point, would not recommend any surgical intervention  Continue splint  Placed sling back on  Do not remove splint  May range hand and wrist   Follow-up office next week for repeat x-rays and possible brace placement versus surgical intervention if reduction is lost    History of Present Illness   Physician Requesting Consult: Chapito Ellison MD  Reason for Consult / Principal Problem:  Fracture right humerus  HPI: Tasha Morris is a 76y o  year old female who presents with the above symptoms after falling at home yesterday  Apparently, she had a syncopal episode while making breakfast   After she fell, she was complaining of pain along her right arm  She states she might have hit her head  She came to the emergency room where x-rays were taken which showed a displaced humeral shaft fracture  Orthopedics was consulted at the tertiary center recommended a closed reduction and splinting which occurred without any incidence  The patient is now admitted for her medical issues    Inpatient consult to Orthopedic Surgery  Consult performed by: Dali Whittaker DO  Consult ordered by: Cyndie Macedo PA-C          Review of Systems   Constitutional: Negative for chills, fever and unexpected weight change  HENT: Negative for hearing loss, nosebleeds and sore throat  Eyes: Negative for pain, redness and visual disturbance  Respiratory: Negative for cough, shortness of breath and wheezing  Cardiovascular: Negative for chest pain, palpitations and leg swelling  Gastrointestinal: Negative for abdominal pain, nausea and vomiting  Endocrine: Negative for polydipsia and polyuria     Genitourinary: Negative for dysuria and hematuria  Musculoskeletal: Positive for arthralgias, joint swelling and myalgias  Negative for back pain, gait problem, neck pain and neck stiffness  As noted in HPI   Skin: Negative for rash and wound  Neurological: Negative for dizziness, numbness and headaches  Psychiatric/Behavioral: Negative for decreased concentration and suicidal ideas  The patient is not nervous/anxious          Historical Information   Past Medical History:   Diagnosis Date    Anxiety     Anxiety with depression     Breast cancer [facility])     2019 - right sided    Breast cancer ([facility] )     Depression     Diabetes mellitus ([facility] )     Hypertension     Port-A-Cath in place     Left    TIA (transient ischemic attack)     only a possible diagnosis    TIA (transient ischemic attack)     "possible"     Past Surgical History:   Procedure Laterality Date    AXILLARY NODE DISSECTION Bilateral     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    AXILLARY NODE DISSECTION      CHOLECYSTECTOMY      FOOT SURGERY      IR PORT PLACEMENT      SIMPLE MASTECTOMY Right     TONSILLECTOMY      TONSILLECTOMY AND ADENOIDECTOMY      WISDOM TOOTH EXTRACTION      WRIST SURGERY Right      Social History   Social History     Substance and Sexual Activity   Alcohol Use Never     Social History     Substance and Sexual Activity   Drug Use Never     E-Cigarette/Vaping    E-Cigarette Use Former User     Start Date 1[birth year]/1958     Quit Date 20      E-Cigarette/Vaping Substances    Nicotine No     THC No     CBD No     Flavoring No     Other No     Unknown No      Social History     Tobacco Use   Smoking Status Former Smoker    Packs/day: 0 25    Years: 62 00    Pack years: 15 50    Types: Cigarettes    Start date:     Quit date: 10/25/2020    Years since quittin 3   Smokeless Tobacco Never Used   Tobacco Comment    stopped smoking 4 weeks ago     Family History:   Family History   Problem Relation Age of Onset  No Known Problems Mother        Meds/Allergies   current meds:   Current Facility-Administered Medications   Medication Dose Route Frequency    acetaminophen (TYLENOL) tablet 975 mg  975 mg Oral Q8H [address]    amLODIPine (NORVASC) tablet 10 mg  10 mg Oral Daily    anastrozole (ARIMIDEX) tablet 1 mg  1 mg Oral Daily    aspirin (ECOTRIN LOW STRENGTH) EC tablet 81 mg  81 mg Oral Daily    atenolol (TENORMIN) tablet 100 mg  100 mg Oral Daily    atorvastatin (LIPITOR) tablet 80 mg  80 mg Oral Daily    calcitriol (ROCALTROL) capsule 0 25 mcg  0 25 mcg Oral Daily    cholecalciferol (VITAMIN D3) tablet 5,000 Units  5,000 Units Oral Once per day on Mon Wed Fri    heparin (porcine) 25,000 units in 0 45% NaCl 250 mL infusion (premix)  3-30 Units/kg/hr (Order-Specific) Intravenous Titrated    heparin (porcine) injection 2,800 Units  2,800 Units Intravenous Q1H PRN    heparin (porcine) injection 5,600 Units  5,600 Units Intravenous Q1H PRN    hydrALAZINE (APRESOLINE) injection 10 mg  10 mg Intravenous Q6H PRN    insulin lispro (HumaLOG) 100 units/mL subcutaneous injection 1-5 Units  1-5 Units Subcutaneous HS    insulin lispro (HumaLOG) 100 units/mL subcutaneous injection 1-6 Units  1-6 Units Subcutaneous TID AC    magnesium gluconate (MAGONATE) tablet 500 mg  500 mg Oral Daily    oxyCODONE (ROXICODONE) IR tablet 2 5 mg  2 5 mg Oral Q6H PRN    oxyCODONE (ROXICODONE) IR tablet 5 mg  5 mg Oral Q6H PRN    pantoprazole (PROTONIX) EC tablet 40 mg  40 mg Oral Early Morning    PARoxetine (PAXIL) tablet 40 mg  40 mg Oral Daily    sodium chloride 0 9 % bolus 500 mL  500 mL Intravenous Once     No Known Allergies    Objective   Vitals: Blood pressure 140/69, pulse 78, temperature 97 9 °F (36 6 °C), temperature source Tympanic, resp  rate 17, weight 74 8 kg (165 lb), SpO2 98 %  ,Body mass index is 29 23 kg/m²        Intake/Output Summary (Last 24 hours) at 2/14/2022 0724  Last data filed at 2/13/2022 1423  Gross per 24 hour Intake 1000 ml   Output --   Net 1000 ml     I/O last 24 hours: In: 1000 [IV Piggyback:1000]  Out: -     Invasive Devices  Report    Peripheral Intravenous Line            Peripheral IV 02/13/22 Distal;Left;Upper;Ventral (anterior) Arm <1 day                Physical Exam  Ortho Exam     Physical Exam   Constitutional: Appears well-developed and well-nourished  No distress  HENT:   Head: Normocephalic  Eyes: Conjunctivae are normal  Right eye exhibits no discharge  Left eye exhibits no discharge  No scleral icterus  Cardiovascular: Normal rate  Pulmonary/Chest: Effort normal    Neurological: Alert and oriented to person, place, and time  Skin: Skin is warm and dry  No rash noted  The patient is not diaphoretic  No erythema  No pallor  Psychiatric: Normal mood and affect  Behavior is normal  Judgment and thought content normal      Neck was soft and supple  There is a negative axial compression test in her neck  Right upper extremity is neurovascular intact  Fingers are pink and mobile  Compartments are soft  The extremity is splinted  There is tenderness along the humerus region  Good dorsiflexion of the wrist   Good extension of the thumb  Pulses intact  Good capillary refill    Lab Results:   CBC:   Lab Results   Component Value Date    WBC 7 16 02/13/2022    HGB 10 1 (L) 02/13/2022    HCT 33 5 (L) 02/13/2022    MCV 88 02/13/2022     02/13/2022    MCH 26 4 (L) 02/13/2022    MCHC 30 1 (L) 02/13/2022    RDW 15 1 02/13/2022    MPV 9 1 02/13/2022    NRBC 0 02/13/2022     CMP:   Lab Results   Component Value Date    SODIUM 137 02/13/2022     02/13/2022    CO2 26 02/13/2022    BUN 28 (H) 02/13/2022    CREATININE 1 96 (H) 02/13/2022    CALCIUM 8 3 (L) 02/13/2022    EGFR 23 02/13/2022     PT/INR:   Lab Results   Component Value Date    INR 1 24 (H) 02/13/2022     Imaging Studies: I have personally reviewed pertinent reports         Initial x-rays show a severely angulated right midshaft humerus fracture    Post reduction films show the reduction to be in anatomic alignment      EKG, Pathology, and Other Studies: I have personally reviewed pertinent reports  VTE Prophylaxis: Sequential compression device Quynh Westfall)     Code Status: Prior  Advance Directive and Living Will:      Power of :    POLST:      Counseling / Coordination of Care  Total floor / unit time spent today 40 minutes  Greater than 50% of total time was spent with the patient and / or family counseling and / or coordination of care   A description of the counseling / coordination of care:

## 2022-02-14 NOTE — PLAN OF CARE
Problem: MOBILITY - ADULT  Goal: Maintain or return to baseline ADL function  Description: INTERVENTIONS:  -  Assess patient's ability to carry out ADLs; assess patient's baseline for ADL function and identify physical deficits which impact ability to perform ADLs (bathing, care of mouth/teeth, toileting, grooming, dressing, etc )  - Assess/evaluate cause of self-care deficits   - Assess range of motion  - Assess patient's mobility; develop plan if impaired  - Assess patient's need for assistive devices and provide as appropriate  - Encourage maximum independence but intervene and supervise when necessary  - Involve family in performance of ADLs  - Assess for home care needs following discharge   - Consider OT consult to assist with ADL evaluation and planning for discharge  - Provide patient education as appropriate  Outcome: Progressing  Goal: Maintains/Returns to pre admission functional level  Description: INTERVENTIONS:  - Perform BMAT or MOVE assessment daily    - Set and communicate daily mobility goal to care team and patient/family/caregiver     - Collaborate with rehabilitation services on mobility goals if consulted  - Stand patient 3 times a day  - Ambulate patient 3 times a day  - Out of bed to chair 3 times a day   - Out of bed for meals 3 times a day  - Out of bed for toileting  - Record patient progress and toleration of activity level   Outcome: Progressing     Problem: MUSCULOSKELETAL - ADULT  Goal: Maintain or return mobility to safest level of function  Description: INTERVENTIONS:  - Assess patient's ability to carry out ADLs; assess patient's baseline for ADL function and identify physical deficits which impact ability to perform ADLs (bathing, care of mouth/teeth, toileting, grooming, dressing, etc )  - Assess/evaluate cause of self-care deficits   - Assess range of motion  - Assess patient's mobility  - Assess patient's need for assistive devices and provide as appropriate  - Encourage maximum independence but intervene and supervise when necessary  - Involve family in performance of ADLs  - Assess for home care needs following discharge   - Consider OT consult to assist with ADL evaluation and planning for discharge  - Provide patient education as appropriate  Outcome: Progressing  Goal: Maintain proper alignment of affected body part  Description: INTERVENTIONS:  - Support, maintain and protect limb and body alignment  - Provide patient/ family with appropriate education  Outcome: Progressing     Problem: PAIN - ADULT  Goal: Verbalizes/displays adequate comfort level or baseline comfort level  Description: Interventions:  - Encourage patient to monitor pain and request assistance  - Assess pain using appropriate pain scale  - Administer analgesics based on type and severity of pain and evaluate response  - Implement non-pharmacological measures as appropriate and evaluate response  - Consider cultural and social influences on pain and pain management  - Notify physician/advanced practitioner if interventions unsuccessful or patient reports new pain  Outcome: Not Progressing

## 2022-02-14 NOTE — UTILIZATION REVIEW
Inpatient Admission Authorization Request   NOTIFICATION OF INPATIENT ADMISSION/INPATIENT AUTHORIZATION REQUEST   SERVICING FACILITY:   [place] [address]  [address] [place] [address] AFFILIATED WITH [place] [address] [address]  Tax ID: [id number]  NPI: [id number]  Place of Service: Inpatient [address] [facility]  [address]  Place of Service Code: 24     ATTENDING PROVIDER:  Attending Name and NPI#: Clarence Rivera [[id number]]  Address: [address] [place] [address] AFFILIATED WITH [place] [address] [address]  Phone: [phone]     UTILIZATION REVIEW CONTACT:  Adis Avalos Utilization Review Supervisor  Network Utilization Review Department  Phone: [phone]  Fax [phone]  Email: [email or web address]     PHYSICIAN ADVISORY SERVICES:  FOR [id number]-[id number] REVIEW - MEDICAL NECESSITY DENIAL  Phone: [phone]  Fax: [phone]  Email: [email or web address]     TYPE OF REQUEST:  Inpatient Status     ADMISSION INFORMATION:  ADMISSION DATE/TIME: 2/13/22  3:45 PM  PATIENT DIAGNOSIS CODE/DESCRIPTION:  Syncope [R55]  Shoulder pain [M25 519]  Closed comminuted right humeral fracture [S42 351A]  DISCHARGE DATE/TIME: No discharge date for patient encounter  DISCHARGE DISPOSITION (IF DISCHARGED): Home with Home Health Care     IMPORTANT INFORMATION:  Please contact the Adis Avalos directly with any questions or concerns regarding this request  Department voicemails are confidential     Send requests for admission clinical reviews, concurrent reviews, approvals, and administrative denials due to lack of clinical to fax [phone]

## 2022-02-14 NOTE — ASSESSMENT & PLAN NOTE
· Continue home Eliquis dosing  CTA chest negative for new PE  Patient was briefly on heparin drip, discontinued

## 2022-02-14 NOTE — ASSESSMENT & PLAN NOTE
· Continue home diabetic regimen  Creatinine at baseline  Continue outpatient lab monitoring with PCP  BP elevated, improved with home medications    Continue outpatient blood pressure monitoring with PCP

## 2022-02-14 NOTE — UTILIZATION REVIEW
Initial Clinical Review    Admission: Date/Time/Statement:   Admission Orders (From admission, onward)     Ordered        02/13/22 1545  INPATIENT ADMISSION  Once                      Orders Placed This Encounter   Procedures    INPATIENT ADMISSION     Standing Status:   Standing     Number of Occurrences:   1     Order Specific Question:   Level of Care     Answer:   Med Surg [16]     Order Specific Question:   Estimated length of stay     Answer:   More than 2 Midnights     Order Specific Question:   Certification     Answer:   I certify that inpatient services are medically necessary for this patient for a duration of greater than two midnights  See H&P and MD Progress Notes for additional information about the patient's course of treatment  ED Arrival Information     Expected Arrival Acuity    - 2/13/2022 12:56 Urgent         Means of arrival Escorted by Service Admission type    [facility] Hospitalist Urgent         Arrival complaint    R shoulder pain        Chief Complaint   Patient presents with    Dizziness       Initial Presentation: 76year old female to the ED from home via EMS with complaints of dizziness, had to lower herself to the floor, twisting her arm and now has arm pain  Admitted to  Inpatient for possible syncope, right humeral shaft fracture  H/O breast ca s/p chemo, scheduled for MRI brain for syncopal episodes, increased confusion  ON arrival, gcs 15, she has right arm pain  Unclear if LOC  Xray shows: Displaced, comminuted mid right humeral shaft fracture  Placed on coaptation splint in the ED  Ortho consult  CTA shows no acute abnormality  Check ECHO  Has elevated d-dimer  Check orthostatics  Monitor tele  Date: 2/14   Day 2: Unclear etiology of sncope and states she tripped and fell  OP MRI  Ortho consult: Neck was soft and supple  There is a negative axial compression test in her neck  Right upper extremity is neurovascular intact    Fingers are pink and mobile  Compartments are soft  The extremity is splinted  There is tenderness along the humerus region  Good dorsiflexion of the wrist   Good extension of the thumb  Pulses intact  Good capillary refill  Reduction is greatly improved and nearly anatomic  No surgical intervention at this time       ED Triage Vitals   Temperature Pulse Respirations Blood Pressure SpO2   02/13/22 1550 02/13/22 1315 02/13/22 1315 02/13/22 1315 02/13/22 1315   98 °F (36 7 °C) 64 18 123/68 95 %      Temp Source Heart Rate Source Patient Position - Orthostatic VS BP Location FiO2 (%)   02/13/22 2300 02/13/22 1315 02/13/22 1315 02/13/22 1315 --   Tympanic Monitor Sitting Left arm       Pain Score       02/13/22 1315       3          Wt Readings from Last 1 Encounters:   02/13/22 74 8 kg (165 lb)     Additional Vital Signs:   Date/Time Temp Pulse Resp BP MAP (mmHg) SpO2 O2 Device Patient Position - Orthostatic VS   02/14/22 0900 -- -- -- 152/73 -- -- -- --   02/14/22 0700 97 9 °F (36 6 °C) 78 17 140/69 95 98 % None (Room air) Lying   02/13/22 2300 99 2 °F (37 3 °C) 66 18 153/66 95 96 % None (Room air) Lying   02/13/22 2242 -- -- -- 167/78 -- -- -- --   02/13/22 2100 -- -- 20 182/88 Abnormal  -- 97 % None (Room air) Lying   02/13/22 2000 -- 71 18 147/99 120 92 % None (Room air) --   02/13/22 1950 -- 80 18 197/95 Abnormal  137 95 % None (Room air) --   02/13/22 1550 98 °F (36 7 °C) 72 -- -- -- -- -- --   02/13/22 1455 -- 68 -- -- -- 97 % -- --   02/13/22 1450 -- 69 -- -- -- 94 % -- --   02/13/22 1430 -- 71 -- 176/80 Abnormal  -- 96 % -- --   02/13/22 1425 -- 69 -- -- -- 98 % -- --   02/13/22 1420 -- 69 -- -- -- 97 % -- --   02/13/22 1415 -- 67 -- -- -- 95 % -- --   02/13/22 1413 -- -- -- 149/70 -- -- -- --   02/13/22 1410 -- 67 -- -- -- 94 % -- --   02/13/22 1405 -- 66 -- -- -- 95 % -- --   02/13/22 1400 -- 66 -- -- -- 94 % -- --   02/13/22 1315 -- 64 18 123/68 89 95 % None (Room air) Sitting     Pertinent Labs/Diagnostic Test Results:   2/13 CTA chest:  No pulmonary embolism     Spiculated left upper lobe mass measuring 3 0 x 2 1 x 2 0 cm, highly suspicious for primary lung neoplasm   Further evaluation with PET/CT is recommended  Extensive mediastinal and left hilar lymphadenopathy, suspicious for metastasis  Nodular thickening of bilateral adrenal glands, indeterminate for metastasis    Enlarged thyroid gland with 2 6 cm nodule in the right lobe     2/14 Xray right humerus:  Near anatomic alignment of right mid shaft humeral fracture on single view  2/14 Xray right humerus Persistent marked angulation of mid humeral shaft fracture with bony overriding  2/13 CT head: No acute intracranial abnormality   Microangiopathic changes  2/13 Xray right shoulder:   Displaced, comminuted mid right humeral shaft fracture  2/13 Xray right humerus: Displaced, comminuted mid right humeral shaft fracture     2/13 EKG:Normal sinus rhythm  Normal ECG  When compared with ECG of 24-NOV-2020 20:16,  No significant change was found      Results from last 7 days   Lab Units 02/13/22  1311   WBC Thousand/uL 7 16   HEMOGLOBIN g/dL 10 1*   HEMATOCRIT % 33 5*   PLATELETS Thousands/uL 234   NEUTROS ABS Thousands/µL 5 11         Results from last 7 days   Lab Units 02/14/22  0915 02/13/22  1311   SODIUM mmol/L 133* 137   POTASSIUM mmol/L 4 4 4 7   CHLORIDE mmol/L 104 104   CO2 mmol/L 22 26   ANION GAP mmol/L 7 7   BUN mg/dL 27* 28*   CREATININE mg/dL 1 79* 1 96*   EGFR ml/min/1 73sq m 26 23   CALCIUM mg/dL 7 6* 8 3*         Results from last 7 days   Lab Units 02/14/22  1137 02/14/22  0841 02/14/22  0642 02/13/22  2108   POC GLUCOSE mg/dl 160* 130 127 165*     Results from last 7 days   Lab Units 02/14/22  0915 02/13/22  1311   GLUCOSE RANDOM mg/dL 138 216*         Results from last 7 days   Lab Units 02/13/22  1454 02/13/22  1311   HS TNI 0HR ng/L  --  5   HS TNI 2HR ng/L 4  --    HSTNI D2 ng/L -1  --      Results from last 7 days   Lab Units 02/13/22  1311   D-DIMER QUANTITATIVE ug/ml FEU 2 03*     Results from last 7 days   Lab Units 02/14/22  0915 02/14/22  0050 02/13/22  1454 02/13/22  1311   PROTIME seconds  --   --  15 4* 15 4*   INR   --   --  1 24* 1 23*   PTT seconds 72* 141* 31  --      Results from last 7 days   Lab Units 02/13/22  1311   BNP pg/mL 100       Results from last 7 days   Lab Units 02/13/22 2023   CLARITY UA  Slightly Cloudy*   COLOR UA  Straw   SPEC GRAV UA  1 010   PH UA  7 0   GLUCOSE UA mg/dl 1+*   KETONES UA mg/dl Negative   BLOOD UA  Trace-Intact*   PROTEIN UA mg/dl Negative   NITRITE UA  Negative   BILIRUBIN UA  Negative   UROBILINOGEN UA E U /dl 0 2   LEUKOCYTES UA  Negative   WBC UA /hpf None Seen   RBC UA /hpf 0-1   BACTERIA UA /hpf None Seen   EPITHELIAL CELLS WET PREP /hpf Moderate*     ED Treatment:   Medication Administration from 02/13/2022 1256 to 02/13/2022 2051       Date/Time Order Dose Route Action     02/13/2022 1314 sodium chloride 0 9 % bolus 1,000 mL 1,000 mL Intravenous New Bag     02/13/2022 1315 fentanyl citrate (PF) 100 MCG/2ML 50 mcg 50 mcg Intravenous Given     02/13/2022 1521 fentanyl citrate (PF) 100 MCG/2ML 100 mcg 100 mcg Intravenous Given     02/13/2022 1802 heparin (porcine) injection 5,600 Units 5,600 Units Intravenous Given     02/13/2022 1938 heparin (porcine) 25,000 units in 0 45% NaCl 250 mL infusion (premix) 18 Units/kg/hr Intravenous New Bag     02/13/2022 1844 heparin (porcine) 25,000 units in 0 45% NaCl 250 mL infusion (premix) 18 Units/kg/hr Intravenous New Bag        Past Medical History:   Diagnosis Date    Anxiety     Anxiety with depression     Breast cancer [facility])     December 2019 - right sided    [facility] cancer ([facility] [address] )     Depression     Diabetes mellitus ([facility] [address] )     Hypertension     Port-A-Cath in place     Left    TIA (transient ischemic attack)     only a possible diagnosis    TIA (transient ischemic attack)     "possible"       Admitting Diagnosis: Syncope [R55]  Shoulder pain [M25 519]  Closed comminuted right humeral fracture [S42 351A]  Age/Sex: 76 y o  female  Admission Orders:  Orthostatic bp  Troponin  Cbc, ptt/inr,   ECHO    Scheduled Medications:  acetaminophen, 975 mg, Oral, Q8H STAS  amLODIPine, 10 mg, Oral, Daily  anastrozole, 1 mg, Oral, Daily  apixaban, 2 5 mg, Oral, BID  aspirin, 81 mg, Oral, Daily  atenolol, 100 mg, Oral, Daily  atorvastatin, 80 mg, Oral, Daily  calcitriol, 0 25 mcg, Oral, Daily  cholecalciferol, 5,000 Units, Oral, Once per day on Mon Wed Fri  insulin lispro, 1-5 Units, Subcutaneous, HS  insulin lispro, 1-6 Units, Subcutaneous, TID AC  magnesium gluconate, 500 mg, Oral, Daily  pantoprazole, 40 mg, Oral, Early Morning  PARoxetine, 40 mg, Oral, Daily  sodium chloride, 500 mL, Intravenous, Once      Continuous IV Infusions:     PRN Meds:  hydrALAZINE, 10 mg, Intravenous, Q6H PRN  oxyCODONE, 2 5 mg, Oral, Q6H PRN  oxyCODONE, 5 mg, Oral, Q6H PRN x2      IP CONSULT TO ORTHOPEDIC SURGERY    Network Utilization Review Department  ATTENTION: Please call with any questions or concerns to [phone] and carefully listen to the prompts so that you are directed to the right person  All voicemails are confidential   [facility] all requests for admission clinical reviews, approved or denied determinations and any other requests to dedicated fax number below belonging to the campus where the patient is receiving treatment   List of dedicated fax numbers for the Facilities:  [address] DENIALS (Administrative/Medical Necessity) [phone]   [address] (Maternity/NICU/Pediatrics) [phone]   [address]  [address] Medical [place] [address] [place] [address] [address] [facility] [phone]

## 2022-02-14 NOTE — ASSESSMENT & PLAN NOTE
· Unclear etiology  Patient states that she tripped and fell and denies any LOC  No head injury    · Heart rate has been stable  · CT head negative for acute findings  · Patient did have right humeral fracture, please see orthopedic note  · Patient will get MRI as outpatient which is part of workup for patient's recently diagnosed lung cancer  · Outpatient follow-up with PCP

## 2022-02-14 NOTE — DISCHARGE SUMMARY
[address]  Discharge- Zack Powell 1945, 76 y o  female MRN: [id number]  Unit/Bed#: APU 16 Encounter: [id number]  Primary Care Provider: Prateek Moreland DO   Date and time admitted to hospital: 2/13/2022 12:56 PM    * Syncope  Assessment & Plan  · Unclear etiology  Patient states that she tripped and fell and denies any LOC  No head injury  · Heart rate has been stable  · CT head negative for acute findings  · Patient did have right humeral fracture, please see orthopedic note  · Patient will get MRI as outpatient which is part of workup for patient's recently diagnosed lung cancer  · Outpatient follow-up with PCP    Fracture of humeral shaft, right, closed  Assessment & Plan  · Orthopedic input appreciated  · No acute surgical intervention needed at this time  · Outpatient follow-up in 1 week for repeat imaging and further discussion regarding plan of care depending on results of repeat imaging  · Home with home care  · Pain control as needed      History of DVT (deep vein thrombosis)  Assessment & Plan  · Continue home Eliquis dosing  CTA chest negative for new PE  Patient was briefly on heparin drip, discontinued  Type 2 diabetes mellitus with stage 2 chronic kidney disease and hypertension ([facility] [address] )  Assessment & Plan  · Continue home diabetic regimen  Creatinine at baseline  Continue outpatient lab monitoring with PCP  BP elevated, improved with home medications    Continue outpatient blood pressure monitoring with PCP      Discharging Physician / Practitioner: Dung Pedro MD  PCP: Prateek Moreland DO  Admission Date:   Admission Orders (From admission, onward)     Ordered        02/13/22 [address]  Once                      Discharge Date: 02/14/22    Medical Problems             Resolved Problems  Date Reviewed: 2/13/2022    None                Consultations During Hospital Stay:  · Orthopedic surgery    Procedures Performed:   · None    Significant Findings / Test Results:   · Humeral fracture    Incidental Findings:   · None     Test Results Pending at Discharge (will require follow up): · None     Outpatient Tests Requested:  · Routine labs with PCP    Complications:     none    Reason for Admission: syncope/humeral fx    Hospital Course:     Georgette Brian is a 76 y o  female patient who originally presented to the hospital on 2/13/2022 due to syncope/fall  Patient states that she did not pass out and did have a mechanical fall  Trauma workup did show right humeral fracture  CT head negative for acute findings  Patient remained clinically stable  Orthopedic team evaluated patient, no surgical intervention needed  Patient will follow-up with orthopedic team as outpatient  Patient was placed in a sling  Patient was refusing rehab and therefore home with home care was initiated  I spoke with patient's daughter regarding patient's refusal to go to rehab and she states that she will see how she does at home  Patient's blood pressure was elevated on admission however did improve once patient's home medications were re-initiated  Patient advised to follow-up with PCP and orthopedic team as outpatient  Will return to the ER if she has any worsening pain or actual syncope    Please see above list of diagnoses and related plan for additional information  Condition at Discharge: stable     Discharge Day Visit / Exam:     Subjective:  No complaints at this time    Vitals: Blood Pressure: 152/73 (02/14/22 0900)  Pulse: 78 (02/14/22 0700)  Temperature: 97 9 °F (36 6 °C) (02/14/22 0700)  Temp Source: Tympanic (02/14/22 0700)  Respirations: 17 (02/14/22 0700)  Weight - Scale: 74 8 kg (165 lb) (02/13/22 1550)  SpO2: 98 % (02/14/22 0700)     Exam:   Physical Exam  Constitutional:       General: She is not in acute distress  HENT:      Head: Normocephalic and atraumatic        Nose: Nose normal       Mouth/Throat:      Mouth: Mucous membranes are moist    Eyes: Extraocular Movements: Extraocular movements intact  Conjunctiva/sclera: Conjunctivae normal    Cardiovascular:      Rate and Rhythm: Normal rate and regular rhythm  Pulmonary:      Effort: Pulmonary effort is normal  No respiratory distress  Abdominal:      Palpations: Abdomen is soft  Tenderness: There is no abdominal tenderness  Musculoskeletal:         General: Normal range of motion  Cervical back: Normal range of motion and neck supple  Comments: Right upper extremity with sling in place  Decreased range of motion right upper extremity   Skin:     General: Skin is warm and dry  Neurological:      General: No focal deficit present  Mental Status: She is alert  Cranial Nerves: No cranial nerve deficit  Psychiatric:         Mood and Affect: Mood normal          Behavior: Behavior normal          Discussion with Family:  Spoke with patient's daughter over the phone regarding discharge plan    Discharge instructions/Information to patient and family:   See after visit summary for information provided to patient and family  Provisions for Follow-Up Care:  See after visit summary for information related to follow-up care and any pertinent home health orders  Disposition:     Home with [facility] (Reminder: Complete face to face encounter)      Planned Readmission:    no     Discharge Statement:  I spent 35 minutes discharging the patient  This time was spent on the day of discharge  I had direct contact with the patient on the day of discharge  Greater than 50% of the total time was spent examining patient, answering all patient questions, arranging and discussing plan of care with patient as well as directly providing post-discharge instructions  Additional time then spent on discharge activities  Discharge Medications:  See after visit summary for reconciled discharge medications provided to patient and family        ** Please Note: This note has been constructed using a voice recognition system **

## 2022-02-14 NOTE — NURSING NOTE
IV removed  Discharge instructions reviewed with patient and patients daughter at the bedside, both parties verbalized understanding and denied having any questions  Patient was transported to main entrance via wheelchair by Davion Paniagua for discharge with all personal belongings

## 2022-02-15 NOTE — UTILIZATION REVIEW
Notification of Discharge   This is a Notification of Discharge from our facility [address]  Please be advised that this patient has been discharge from our facility  Below you will find the admission and discharge date and time including the patients disposition  UTILIZATION REVIEW CONTACT:  Ana Paula Pete  Utilization   Network Utilization Review Department  Phone: [phone] carefully listen to the prompts  All voicemails are confidential   Email: [email or web address]     PHYSICIAN ADVISORY SERVICES:  FOR [id number] REVIEW - MEDICAL NECESSITY DENIAL  Phone: [phone]  Fax: [phone]  Email: [email or web address]     PRESENTATION DATE: 2/13/2022 12:56 PM  OBERVATION ADMISSION DATE:   INPATIENT ADMISSION DATE: 2/13/22  3:45 PM   DISCHARGE DATE: 2/14/2022  4:40 PM  DISPOSITION: Home with [place] with [address] [place] [address] Road INFORMATION:  Send all requests for admission clinical reviews, approved or denied determinations and any other requests to dedicated fax number below belonging to the campus where the patient is receiving treatment   List of dedicated fax numbers:  [address] DENIALS (Administrative/Medical Necessity) [phone]   [address] (Maternity/NICU/Pediatrics) [phone]   Alexia Caballero [phone]   130 [facility] [phone]   [address] [place] [address] [phone]   2000 [facility] [address] [place] [address],[address] [place] [address] [phone]   [facility]  [phone]   [address]  [address] [facility] 1000 [address] [facility] [phone]

## 2022-02-15 NOTE — CASE MANAGEMENT
Case Management Assessment & Discharge Planning Note    Patient name Bijan Parrish  Location [facility] MRN [id number]  : 1945 Date 2022       Current Admission Date: 2022  Current Admission Diagnosis:Syncope   Patient Active Problem List    Diagnosis Date Noted    Syncope 2022    Fracture of humeral shaft, right, closed 2022    [facility]NA [facility]) ([facility] ) 2020    History of DVT (deep vein thrombosis) 2020    Type 2 diabetes mellitus with stage 2 chronic kidney disease and hypertension ([place] [address] ) 2020    Stroke-like symptoms 2020    Pulmonary nodule 2020    History of breast cancer 2020    Hypertensive urgency 2020    Hypoxia 2019    Bronchospasm     Acute respiratory failure with hypoxia ([place] [address] ) 2019    DVT (deep venous thrombosis) ([place] [address] ) 2019    Tobacco abuse 2019    Stage 3 chronic kidney disease ([place] [address] ) 2019    Type 2 diabetes mellitus without complication, without long-term current use of insulin ([place] [address] ) 2019      LOS (days): 1  Geometric Mean LOS (GMLOS) (days):   Days to GMLOS:     OBJECTIVE:    Risk of Unplanned Readmission Score: 18         Current admission status: Inpatient  Referral Reason: Other (D/C PLANNING)    Preferred Pharmacy:   [facility]/pharmacy #[id number]- [address] [address]  Phone: [phone] Fax: [phone]    [facility]/pharmacy #[id number]- Sergio Izaguirre  [facility]  [address] [address]  Phone: [phone] Fax: [phone]    Primary Care Provider: Regino Perez DO    Primary Insurance: [facility]  Secondary Insurance:     ASSESSMENT:  Connor 26 Agents    There are no active Health Care Agents on file                   Readmission Root Cause  30 Day Readmission: No    Patient Information  Admitted from[de-identified] Home  Mental Status: Alert  During Assessment patient was accompanied by: Not accompanied during assessment  Assessment information provided by[de-identified] Patient  Primary Caregiver: Family  Caregiver's Name[de-identified] Bolivar Hughes Relationship to Patient[de-identified] Family Member (daughter)  Caregiver's Telephone Number[de-identified] [phone]  Support Systems: Daughter  [place] of Residence: [address] do you live in?: [address] entry access options   Select all that apply : Stairs  Number of steps to enter home : 2  Do the steps have railings?: Yes  Type of Current Residence: [place]  In the last 12 months, was there a time when you were not able to pay the mortgage or rent on time?: No  In the last 12 months, how many places have you lived?: 1  In the last 12 months, was there a time when you did not have a steady place to sleep or slept in a shelter (including now)?: No  Homeless/housing insecurity resource given?: N/A  Living Arrangements: Lives w/ Daughter (they have a door that seperates their living areas she has an in-law suite)  Is patient a ?: No    Activities of Daily Living Prior to Admission  Functional Status: Assistance (cook, cleaning, meds  they shop together)  Completes ADLs independently?: Yes  Ambulates independently?: Yes  Does patient use assisted devices?: Yes  Assisted Devices (DME) used: You Narvaez  Does patient currently own DME?: Yes  What DME does the patient currently own?: Walker,Other (Comment),Home Oxygen concentrator,Shower Chair (oxygen 2 liters at night young's)  Does patient have a history of Outpatient Therapy (PT/OT)?: No  Does the patient have a history of Short-Term Rehab?: No  Does patient have a history of HHC?: No  Does patient currently have [facility]?: No (pt does have a nurse from [employer] come to see her one time a month)         Patient Information Continued  Income Source: Pension/FPC  Does patient have prescription coverage?: Yes ([employer] [facility])  Within the past 12 months, you worried that your food would run out before you got the money to buy more : Never true  Within the past 12 months, the food you bought just didnt last and you didnt have money to get more : Never true  Food insecurity resource given?: N/A  Does patient receive dialysis treatments?: No  Does patient have a history of substance abuse?: No  Does patient have a history of Mental Health Diagnosis?: Yes (depression)  Is patient receiving treatment for mental health?: Yes (medication from her pcp)  Has patient received inpatient treatment related to mental health in the last 2 years?: No         Means of Transportation  Means of Transport to Appts[de-identified] Family transport  In the past 12 months, has lack of transportation kept you from medical appointments or from getting medications?: No  In the past 12 months, has lack of transportation kept you from meetings, work, or from getting things needed for daily living?: No  Was application for public transport provided?: N/A        DISCHARGE DETAILS:    Discharge planning discussed with[de-identified] patient  and daughter was called at 13:40pm and met her in the room  Freedom of Choice: Yes  Comments - Freedom of Choice: hhc  was ordered by the doctor  CM contacted family/caregiver?: Yes  Were Treatment Team discharge recommendations reviewed with patient/caregiver?: Yes  Did patient/caregiver verbalize understanding of patient care needs?: Yes  Were patient/caregiver advised of the risks associated with not following Treatment Team discharge recommendations?: Yes    Contacts  Patient Contacts: Eduardo Jane  Relationship to Patient[de-identified] Family  Contact Method: [address] [place] [address] Person  Phone Number: [phone]  Reason/Outcome: Discharge [address]         Is the patient interested in Rejikim Bienvenido at discharge?: Yes         Other Referral/Resources/Interventions Provided:  Interventions: Re[facility] (home with [facility] and family support)  Referral Comments: permission to send a blanket referrals pt was not active carline rosen [facility], she has a mariela rn come once a month to check on her  , [facility] accepted the pt     family was given phone# of private care agencies    Would you like to participate in our [address] service program?  : No - Declined    Treatment Team Recommendation: Home with [address] ([facility])  Discharge Destination Plan[de-identified] Home with [address] [employer] (home with family support and [facility])  Transport at Discharge : [place] by: Family member           Family notified[de-identified] daughter was in the room

## 2022-02-16 NOTE — DISCHARGE INSTRUCTIONS
-continue to use Ace bandage and elevation in order to improve swelling related to your arm fracture    -follow-up with orthopedics as soon as possible

## 2022-02-16 NOTE — TELEPHONE ENCOUNTER
Spoke to patient about an appointment and put appointment request in as noted in previous note  Patient's home health care nurse was at the home at the time of call  Danette Liang from [address] [place] [address] # [phone]  Danette Liang stated the patient's arm is so swollen it is wheeping  She stated the patient is dabbing the fluid with paper towels and it is soaking her clothes  She stated she wants to be sure this is okay  Advised that per the ER yesterday she is to continue ice 20 mins on 20 mins off and elevate above heart level by laying down as much as possible  Advised they also recommended compression with ACE bandage and follow up with us  We are working on follow up  She reports that patient is not having circulatory issues at all, blood supply is okay  But this wheeping is concerning  Please advise if we have anything else to add prior to an appointment next week

## 2022-02-16 NOTE — TELEPHONE ENCOUNTER
Appt scheduled for Monday with Shireen Patel  In the meantime, Ice 20 on 20 off, elevation, flex and extend fingers several times per hour  Keep covering over weeping areas  Daughter verbalized understanding

## 2022-02-16 NOTE — ED PROVIDER NOTES
History  Chief Complaint   Patient presents with    Hand Swelling     recently broken arm edema with leaking that started today      Patient is a 75-year-old female with history of DVT on Eliquis that presents for evaluation of right hand swelling  Patient recently admitted for midshaft humeral fracture  Patient says that over the day today she has had worsening swelling of her right hand and distal forearm  The way that her splint is constructed, patient has her right hand at a dependent position  Please use has had some worsening swelling in the hand throughout the day today with some associated ecchymosis  Patient denies any pain associated with the symptoms  She denies weakness, numbness, paresthesias associated with the symptoms  He has not been compressing or able to elevate her arm  She has not been icing the arm  Patient has not her from Orthopedics for follow-up  Prior to Admission Medications   Prescriptions Last Dose Informant Patient Reported? Taking?    LINAGLIPTIN PO  Child Yes No   Sig: Take 5 mg by mouth   PARoxetine (PAXIL) 40 MG tablet  Child Yes No   Sig: Take by mouth   amLODIPine (NORVASC) 10 mg tablet   No No   Sig: Take 1 tablet (10 mg total) by mouth daily   anastrozole (ARIMIDEX) 1 mg tablet   Yes No   Sig: Take 1 mg by mouth daily   apixaban (Eliquis) 2 5 mg   Yes No   Sig: Take by mouth 2 (two) times a day   aspirin (ECOTRIN LOW STRENGTH) 81 mg EC tablet  Child Yes No   Sig: Take 81 mg by mouth daily   atenolol (TENORMIN) 100 mg tablet  Child Yes No   Sig: Take by mouth   atorvastatin (LIPITOR) 80 mg tablet  Child Yes No   Sig: Take 80 mg by mouth daily   calcitriol (ROCALTROL) 0 25 mcg capsule   Yes No   Sig: Take 0 25 mcg by mouth daily   cholecalciferol (VITAMIN D3) 1,000 units tablet  Child Yes No   Sig: Take 5,000 Units by mouth 3 (three) times a week   famotidine (PEPCID) 20 mg tablet  Child Yes No   Sig: Take 20 mg by mouth 2 (two) times a day   magnesium gluconate (MAGONATE) 500 mg tablet   Yes No   Sig: Take 500 mg by mouth daily   omeprazole (PriLOSEC) 40 MG capsule  Child Yes No   Sig: Take 40 mg by mouth daily   repaglinide (PRANDIN) 1 mg tablet  Child Yes No   Sig: TAKE 1 TAB BY MOUTH THREE TIMES A DAY BEFORE MEALS  Facility-Administered Medications: None       Past Medical History:   Diagnosis Date    Anxiety     Anxiety with depression     Breast cancer [facility])     2019 - right sided    [email or web address] )     Depression     Diabetes mellitus ([email or web address] )     Hypertension     Port-A-Cath in place     Left    TIA (transient ischemic attack)     only a possible diagnosis    TIA (transient ischemic attack)     "possible"       Past Surgical History:   Procedure Laterality Date    AXILLARY NODE DISSECTION Bilateral     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    AXILLARY NODE DISSECTION      CHOLECYSTECTOMY      FOOT SURGERY      IR PORT PLACEMENT      SIMPLE MASTECTOMY Right     TONSILLECTOMY      TONSILLECTOMY AND ADENOIDECTOMY      WISDOM TOOTH EXTRACTION      WRIST SURGERY Right        Family History   Problem Relation Age of Onset    No Known Problems Mother      I have reviewed and agree with the history as documented      E-Cigarette/Vaping    E-Cigarette Use Former User     Start Date 1/3/1960     Quit Date 20      E-Cigarette/Vaping Substances    Nicotine No     THC No     CBD No     Flavoring No     Other No     Unknown No      Social History     Tobacco Use    Smoking status: Former Smoker     Packs/day: 0 25     Years: 62 00     Pack years: 15 50     Types: Cigarettes     Start date:      Quit date: 10/25/2020     Years since quittin 3    Smokeless tobacco: Never Used    Tobacco comment: stopped smoking 4 weeks ago   Vaping Use    Vaping Use: Former    Start date: 1/3/1958   [facility] Quit date: 2020   Substance Use Topics    Alcohol use: Never    Drug use: Never       Review of Systems   Constitutional: Negative for fever  HENT: Negative for sore throat  Respiratory: Negative for shortness of breath  Cardiovascular: Negative for chest pain  Gastrointestinal: Negative for abdominal pain  Genitourinary: Negative for dysuria  Musculoskeletal: Negative for back pain  Right hand swelling    Skin: Negative for rash  Neurological: Negative for light-headedness  Psychiatric/Behavioral: Negative for agitation  All other systems reviewed and are negative  Physical Exam  Physical Exam  Vitals reviewed  Constitutional:       General: She is not in acute distress  Appearance: She is well-developed  HENT:      Head: Normocephalic  Eyes:      Pupils: Pupils are equal, round, and reactive to light  Cardiovascular:      Rate and Rhythm: Normal rate and regular rhythm  Heart sounds: Normal heart sounds  Pulmonary:      Effort: Pulmonary effort is normal       Breath sounds: Normal breath sounds  Abdominal:      General: Bowel sounds are normal  There is no distension  Palpations: Abdomen is soft  Tenderness: There is no abdominal tenderness  There is no guarding  Musculoskeletal:         General: Swelling present  No tenderness or deformity  Normal range of motion  Cervical back: Normal range of motion and neck supple  Comments: Patient with pitting edema and right hand and right distal forearm  2+ radial pulse  Median/radial/ulnar nerve distribution intact motor and sensory  Skin:     General: Skin is warm and dry  Capillary Refill: Capillary refill takes less than 2 seconds  Neurological:      Mental Status: She is alert and oriented to person, place, and time  Cranial Nerves: No cranial nerve deficit  Sensory: No sensory deficit  Psychiatric:         Behavior: Behavior normal          Thought Content:  Thought content normal          Judgment: Judgment normal          Vital Signs  ED Triage Vitals [02/15/22 2105]   Temperature Pulse Respirations Blood Pressure SpO2   98 5 °F (36 9 °C) 76 17 133/72 95 %      Temp Source Heart Rate Source Patient Position - Orthostatic VS BP Location FiO2 (%)   Oral Monitor Sitting Left arm --      Pain Score       8           Vitals:    02/15/22 2105   BP: 133/72   Pulse: 76   Patient Position - Orthostatic VS: Sitting         Visual Acuity      ED Medications  Medications   oxyCODONE (ROXICODONE) IR tablet 5 mg (5 mg Oral Given 2/15/22 2139)       Diagnostic Studies  Results Reviewed     None                 No orders to display              Procedures  Procedures         ED Course                               SBIRT 20yo+      Most Recent Value   SBIRT (25 yo +)    In order to provide better care to our patients, we are screening all of our patients for alcohol and drug use  Would it be okay to ask you these screening questions? Yes Filed at: 02/15/2022 2239   Initial Alcohol Screen: US AUDIT-C     1  How often do you have a drink containing alcohol? 0 Filed at: 02/15/2022 2239   2  How many drinks containing alcohol do you have on a typical day you are drinking? 0 Filed at: 02/15/2022 2239   3a  Male UNDER 65: How often do you have five or more drinks on one occasion? 0 Filed at: 02/15/2022 2239   3b  FEMALE Any Age, or MALE 65+: How often do you have 4 or more drinks on one occassion? 0 Filed at: 02/15/2022 2239   Audit-C Score 0 Filed at: 02/15/2022 2239   CRUZ: How many times in the past year have you    Used an illegal drug or used a prescription medication for non-medical reasons? Never Filed at: 02/15/2022 2239                    MDM  Number of Diagnoses or Management Options  Swelling of right hand  Diagnosis management comments: Patient is a 75-year-old female who presents for evaluation of right hand swelling with associated splint in place from humerus fracture    Patient had right hand/forearm wrapped with Ace bandage for compression dressing and had the arm elevated above her heart while laying down with subsequent improvement of swelling  Near complete resolution swelling this time  Advised on continued compression and elevation of follow-up to Orthopedics  Disposition  Final diagnoses:   Swelling of right hand     Time reflects when diagnosis was documented in both MDM as applicable and the Disposition within this note     Time User Action Codes Description Comment    2/15/2022 10:28 PM Matilda Garcia Add [M79 89] Swelling of right hand       ED Disposition     ED Disposition Condition Date/Time Comment    Discharge Stable Tue Feb 15, 2022 10:28 PM Selene Thomson discharge to home/self care  Follow-up Information     Follow up With Specialties Details Why Contact Info Additional [address] Emergency Department Emergency Medicine  If symptoms worsen [address] Sienna Rutledge [facility] [phone]  [facility] Emergency Department, [address] [facility] [place], [address] Mary Norris DO Orthopedic Surgery Schedule an appointment as soon as possible for a visit   [address] [place] [address]  [place] [address]  [phone]             Patient's Medications   Discharge Prescriptions    No medications on file       No discharge procedures on file      PDMP Review       Value Time User    PDMP Reviewed  Yes 9/22/2019  9:52 AM [address]          ED Provider  Electronically Signed by           Gretchen Walton MD  02/15/22 3986

## 2022-02-16 NOTE — TELEPHONE ENCOUNTER
Good Afternoon!!    Patient: Linn Dumont      [birth year]    MRN: [id number]    Call back # [phone]    Reason for appointment: 1 week follow up from hospital for R humerus fx     Requested doctor/location: Dr Jaya Horne    Please make accommodations for this patient      Thank you,  Abbie Higgins RN

## 2022-02-17 NOTE — ED PROVIDER NOTES
History  Chief Complaint   Patient presents with    Medical Problem     Patient reports she broke her arm a fw days ago, had it splinted, and reports increased weeping  Patient is a 43-year-old female with a past medical history of breast cancer, diabetes mellitus, DVT (on Eliquis and compliant) and hypertension, presenting to ED for evaluation of right upper extremity swelling  Patient sustained a right humeral shaft fracture after a fall on 2/13  She was placed in a coaptation splint and sling and discharged with ortho follow-up  Patient return to the ED 2 days ago for increased swelling and ecchymosis of the distal right upper extremity  The extremity was elevated and wrapped with compression bandages and the swelling significantly improved  Patient reports increased swelling since returning home and daughter noticed that distal forearm was weeping blood  Patient denies any increased pain in the extremity  She denies chest pain or shortness of breath  The daughter has been attempting to elevate the extremity and apply compression bandages when she is home but says the patient is home alone during the day and not able to do this on her own  Prior to Admission Medications   Prescriptions Last Dose Informant Patient Reported? Taking?    LINAGLIPTIN PO  Child Yes No   Sig: Take 5 mg by mouth   PARoxetine (PAXIL) 40 MG tablet  Child Yes No   Sig: Take by mouth   amLODIPine (NORVASC) 10 mg tablet   No No   Sig: Take 1 tablet (10 mg total) by mouth daily   anastrozole (ARIMIDEX) 1 mg tablet   Yes No   Sig: Take 1 mg by mouth daily   apixaban (Eliquis) 2 5 mg   Yes No   Sig: Take by mouth 2 (two) times a day   aspirin (ECOTRIN LOW STRENGTH) 81 mg EC tablet  Child Yes No   Sig: Take 81 mg by mouth daily   atenolol (TENORMIN) 100 mg tablet  Child Yes No   Sig: Take by mouth   atorvastatin (LIPITOR) 80 mg tablet  Child Yes No   Sig: Take 80 mg by mouth daily   calcitriol (ROCALTROL) 0 25 mcg capsule   Yes No   Sig: Take 0 25 mcg by mouth daily   cholecalciferol (VITAMIN D3) 1,000 units tablet  Child Yes No   Sig: Take 5,000 Units by mouth 3 (three) times a week   famotidine (PEPCID) 20 mg tablet  Child Yes No   Sig: Take 20 mg by mouth 2 (two) times a day   magnesium gluconate (MAGONATE) 500 mg tablet   Yes No   Sig: Take 500 mg by mouth daily   omeprazole (PriLOSEC) 40 MG capsule  Child Yes No   Sig: Take 40 mg by mouth daily   repaglinide (PRANDIN) 1 mg tablet  Child Yes No   Sig: TAKE 1 TAB BY MOUTH THREE TIMES A DAY BEFORE MEALS  Facility-Administered Medications: None       Past Medical History:   Diagnosis Date    Anxiety     Anxiety with depression     [facility] cancer [facility])     2019 - right sided    [facility] [address] [facility]     Depression     [facility]     Hypertension     Port-A-Cath in place     Left    TIA (transient ischemic attack)     only a possible diagnosis    TIA (transient ischemic attack)     "possible"       Past Surgical History:   Procedure Laterality Date    AXILLARY NODE DISSECTION Bilateral     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    AXILLARY NODE DISSECTION      CHOLECYSTECTOMY      FOOT SURGERY      IR PORT PLACEMENT      SIMPLE MASTECTOMY Right     TONSILLECTOMY      TONSILLECTOMY AND ADENOIDECTOMY      WISDOM TOOTH EXTRACTION      WRIST SURGERY Right        Family History   Problem Relation Age of Onset    No Known Problems Mother      I have reviewed and agree with the history as documented      E-Cigarette/Vaping    E-Cigarette Use Former User     Start Date 1/3/1958     Quit Date 20      E-Cigarette/Vaping Substances    Nicotine No     THC No     CBD No     Flavoring No     Other No     Unknown No      Social History     Tobacco Use    Smoking status: Former Smoker     Packs/day: 0 25     Years: 62 00     Pack years: 15 50     Types: Cigarettes     Start date:      Quit date: 10/25/2020     Years since quittin 3  Smokeless tobacco: Never Used    Tobacco comment: stopped smoking 4 weeks ago   Vaping Use    Vaping Use: Former    Start date: 1/3/1958   Breonna Godinez Quit date: 11/4/2020   Substance Use Topics    Alcohol use: Never    Drug use: Never       Review of Systems   Constitutional: Negative for appetite change, chills, fatigue and fever  HENT: Negative for congestion, rhinorrhea, sinus pressure, sinus pain and sore throat  Eyes: Negative for photophobia and visual disturbance  Respiratory: Negative for cough, shortness of breath and wheezing  Cardiovascular: Negative for chest pain, palpitations and leg swelling  Gastrointestinal: Negative for abdominal pain, blood in stool, constipation, diarrhea, nausea and vomiting  Genitourinary: Negative for difficulty urinating, dysuria, flank pain, frequency, hematuria and urgency  Musculoskeletal: Negative for arthralgias, back pain, joint swelling, myalgias and neck pain  Skin: Positive for color change (Diffuse ecchymosis, right upper extremity)  Neurological: Negative for dizziness, syncope, weakness, light-headedness and headaches  All other systems reviewed and are negative  Physical Exam  Physical Exam  Vitals and nursing note reviewed  Constitutional:       General: She is awake  Appearance: Normal appearance  She is well-developed  She is not toxic-appearing or diaphoretic  HENT:      Head: Normocephalic and atraumatic  Right Ear: External ear normal       Left Ear: External ear normal       Nose: Nose normal       Mouth/Throat:      Lips: Pink  Mouth: Mucous membranes are moist    Eyes:      General: Lids are normal  No scleral icterus  Conjunctiva/sclera: Conjunctivae normal       Pupils: Pupils are equal, round, and reactive to light  Cardiovascular:      Rate and Rhythm: Normal rate and regular rhythm  Pulses: Normal pulses  Radial pulses are 2+ on the right side and 2+ on the left side        Heart sounds: Normal heart sounds, S1 normal and S2 normal    Pulmonary:      Effort: Pulmonary effort is normal  No accessory muscle usage  Breath sounds: Normal breath sounds  No stridor  No decreased breath sounds, wheezing, rhonchi or rales  Abdominal:      General: Abdomen is flat  Bowel sounds are normal  There is no distension  Palpations: Abdomen is soft  Tenderness: There is no abdominal tenderness  There is no right CVA tenderness, left CVA tenderness, guarding or rebound  Musculoskeletal:        Arms:       Cervical back: Full passive range of motion without pain and neck supple  No signs of trauma  No pain with movement  Right lower leg: No edema  Left lower leg: No edema  Lymphadenopathy:      Cervical: No cervical adenopathy  Skin:     General: Skin is warm and dry  Capillary Refill: Capillary refill takes less than 2 seconds  Coloration: Skin is not cyanotic, jaundiced or pale  Comments: Patient with pitting edema and ecchymosis of right hand and right forearm  2+ radial pulse  Strength/sensation intact  Cap refill <2 seconds  No erythema/warmth  Neurological:      Mental Status: She is alert and oriented to person, place, and time  GCS: GCS eye subscore is 4  GCS verbal subscore is 5  GCS motor subscore is 6  Gait: Gait normal    Psychiatric:         Mood and Affect: Mood normal          Speech: Speech normal          Behavior: Behavior is cooperative           Vital Signs  ED Triage Vitals [02/17/22 1814]   Temperature Pulse Respirations Blood Pressure SpO2   98 7 °F (37 1 °C) 87 18 (!) 137/102 99 %      Temp Source Heart Rate Source Patient Position - Orthostatic VS BP Location FiO2 (%)   Oral Monitor Lying Left arm --      Pain Score       --           Vitals:    02/17/22 1814   BP: (!) 137/102   Pulse: 87   Patient Position - Orthostatic VS: Lying         Visual Acuity      ED Medications  Medications   oxyCODONE (ROXICODONE) IR tablet 5 mg (5 mg Oral Given 2/17/22 1858)       Diagnostic Studies  Results Reviewed     Procedure Component Value Units Date/Time    Basic metabolic panel [[id number]]  (Abnormal) Collected: 02/17/22 1911    Lab Status: Final result Specimen: Blood from Arm, Left Updated: 02/17/22 2002     Sodium 137 mmol/L      Potassium 4 2 mmol/L      Chloride 106 mmol/L      CO2 25 mmol/L      ANION GAP 6 mmol/L      BUN 21 mg/dL      Creatinine 1 60 mg/dL      Glucose 188 mg/dL      Calcium 7 4 mg/dL      eGFR 30 ml/min/1 73sq m     Narrative:      [place] guidelines for Chronic Kidney Disease (CKD):     Stage 1 with normal or high GFR (GFR > 90 mL/min/1 73 square meters)    Stage 2 Mild CKD (GFR = 60-89 mL/min/1 73 square meters)    Stage 3A Moderate CKD (GFR = 45-59 mL/min/1 73 square meters)    Stage 3B Moderate CKD (GFR = 30-44 mL/min/1 73 square meters)    Stage 4 Severe CKD (GFR = 15-29 mL/min/1 73 square meters)    Stage 5 End Stage CKD (GFR <15 mL/min/1 73 square meters)  Note: GFR calculation is accurate only with a steady state creatinine    CBC and differential [[id number]]  (Abnormal) Collected: 02/17/22 1911    Lab Status: Final result Specimen: Blood from Arm, Left Updated: 02/17/22 1930     WBC 8 61 Thousand/uL      RBC 3 66 Million/uL      Hemoglobin 9 7 g/dL      Hematocrit 32 3 %      MCV 88 fL      MCH 26 5 pg      MCHC 30 0 g/dL      RDW 15 5 %      MPV 8 9 fL      Platelets 899 Thousands/uL      nRBC 0 /100 WBCs      Neutrophils Relative 75 %      Immat GRANS % 1 %      Lymphocytes Relative 15 %      Monocytes Relative 8 %      Eosinophils Relative 1 %      Basophils Relative 0 %      Neutrophils Absolute 6 49 Thousands/µL      Immature Grans Absolute 0 04 Thousand/uL      Lymphocytes Absolute 1 32 Thousands/µL      Monocytes Absolute 0 67 Thousand/µL      Eosinophils Absolute 0 07 Thousand/µL      Basophils Absolute 0 02 Thousands/µL                  XR humerus RIGHT    (Results Pending)              Procedures  Splint application    Date/Time: 2/17/2022 7:34 PM  Performed by: Anna Sanchez PA-C  Authorized by: Anna Sanchez PA-C   [place] Protocol:  Procedure performed by: (Dr Jazmin Xiao)  Consent: Verbal consent obtained  Risks and benefits: risks, benefits and alternatives were discussed  Consent given by: patient  Time out: Immediately prior to procedure a "time out" was called to verify the correct patient, procedure, equipment, support staff and site/side marked as required  Timeout called at: 2/17/2022 7:34 PM   Patient understanding: patient states understanding of the procedure being performed  Required items: required blood products, implants, devices, and special equipment available  Patient identity confirmed: verbally with patient      Pre-procedure details:     Sensation:  Normal    Skin color:  Pink  Procedure details:     Laterality:  Right    Location:  Arm    Arm:  L upper arm    Splint type:  Long arm (coaptation splint + posterior long arm)    Supplies:  Cotton padding, Ortho-Glass and 3 layer wrap  Post-procedure details:     Pain:  Unchanged    Sensation:  Normal    Skin color:  Pink    Patient tolerance of procedure: Tolerated well, no immediate complications             ED Course                               SBIRT 20yo+      Most Recent Value   SBIRT (22 yo +)    In order to provide better care to our patients, we are screening all of our patients for alcohol and drug use  Would it be okay to ask you these screening questions?  No Filed at: 02/17/2022 1922                    MDM  Number of Diagnoses or Management Options  Closed displaced transverse fracture of shaft of right humerus, initial encounter  Swelling of right hand  Diagnosis management comments: Patient is a 61-year-old female with a past medical history of breast cancer, diabetes mellitus, DVT (on Eliquis and compliant) and hypertension, presenting to ED for evaluation of right upper extremity swelling  Splint removed to evaluate upper extremity  No evidence of compartment syndrome or large hematoma  Humeral fracture is very unstable  Coaptation splint and posterior long-arm splint applied  X-ray obtained and images sent to Orthopedics who are okay with current reduction status  Risks of obtaining venous duplex outweigh the benefits as fracture is very unstable and would put patient at risk for neurovascular injury or other damage to structures surrounding the fracture site  Patient is currently on Eliquis which makes risk of DVT low  CBC near patient's baseline  Encouraged elevation, compression of distal arm and follow-up with ortho (patient has appointment on Monday)  The management plan was discussed in detail with the patient at bedside and all questions were answered  Prior to discharge, verbal and written instructions provided  Strict ED return precautions discussed in detail  The patient verbalized understanding of our discussion and plan of care, and agrees to return to the Emergency Department for concerns and progression of illness         Amount and/or Complexity of Data Reviewed  Clinical lab tests: ordered and reviewed  Tests in the radiology section of CPT®: ordered and reviewed  Discuss the patient with other providers: yes    Patient Progress  Patient progress: stable      Disposition  Final diagnoses:   Swelling of right hand   Closed displaced transverse fracture of shaft of right humerus, initial encounter     Time reflects when diagnosis was documented in both MDM as applicable and the Disposition within this note     Time User Action Codes Description Comment    2/17/2022  7:19 PM Matthieu Mark [[id number]] Swelling of right hand     2/17/2022  7:33 PM Graham Oconnor1 [facility] Closed displaced transverse fracture of shaft of right humerus, initial encounter       ED Disposition     ED Disposition Condition Date/Time Comment    Discharge Stable Thu Feb 17, 2022  7:45 PM Selene Cava discharge to home/self care              Follow-up Information     Follow up With Specialties Details Why Contact Info Additional [address] Emergency Department Emergency Medicine  If symptoms worsen [address] [place] [phone] [facility] Emergency Department, [address] [place] [address], [facility], [address]  Schedule an appointment as soon as possible for a visit   [place] [phone]  [address] [facility] Orthopedic Surgery Go in 4 days  [address] [facility] [address] [place] [phone]  [address] [facility], [facility] [address] [place], [place], [address]          Patient's Medications   Discharge Prescriptions    No medications on file           PDMP Review       Value Time User    PDMP Reviewed  Yes 9/22/2019  9:52 AM Roz Smith, 10 [facility]          ED Provider  Electronically Signed by           Mandie Polo PA-C  02/17/22 2008

## 2022-02-21 NOTE — PROGRESS NOTES
Assessment/Plan:   Diagnoses and all orders for this visit:    Closed comminuted right humeral fracture  -     Ambulatory referral to Orthopedic Surgery  -     XR humerus right; Future  -     Brace  -     Fracture / Dislocation Treatment    Reviewed today's physical exam findings and x-ray findings with patient at time of visit  Today's x-rays demonstrate improved angulation of comminuted humeral shaft fracture as compared to previous imaging  She will remain in the current Ortho Glass splint  An order has been submitted for clamshell humeral brace, she will be contacted by the orthotist for an appointment for fitment  Discussed conservative management via bracing versus surgical intervention, for which she would have to be referred to a specialist   Given her recent medical history of stage III lung cancer as per parents, and past medical history of DVT, diabetes mellitus, and hypertension, she does not wish to pursue surgical intervention at this time  She will be seen for follow-up in 3 weeks for re-evaluation and repeat x-rays of the right humerus  Patient expresses understanding is in agreement with treatment plan  Treatment options were discussed with the patient and the daughter in great detail  Unfortunate, she has been diagnosed with grade 3 lung cancer  They do not want any surgical intervention  The angulation has slightly increased  Treatment options were discussed  She will be placed in a fracture brace  She understands that this does not improve her healing or the healing does not occur, she may still require surgical intervention and they are willing to take this wrist   Return back in 3 weeks for re-evaluation with new x-rays of right humerus-two views  If her condition changes, she will not hesitate to let us know  Physical exam shows diffuse tenderness  There is no weeping of the skin  Residual ecchymosis is present which is not to be unexpected    Good dorsiflexion of the wrist      Subjective:   Patient ID: Greer Louis  1945     HPI  Patient is a 76 y o  female who presents for initial evaluation of right comminuted proximal humeral fracture sustained 2/13/2022 as result of a fall secondary to syncopal episode  She was evaluated that same day at a local [facility] ED where x-rays were taken and read as significant for midshaft humerus fracture  She was placed in a splint and referred to Orthopedics  She reported to the ED 2 subsequent times on 02/15 and 9/75 for complications involving swelling, development of fracture blisters, and repeat  Each time her dressings and splint were changed  On today's presentation, she reports continued bruising and swelling of the arm, forearm, and hand of the right upper extremity  She reports dull achy pain at rest, and sharp pain with minimal motion  She denies any numbness or tingling      The following portions of the patient's history were reviewed and updated as appropriate:  Past medical history, past surgical history, Family history, social history, current medications and allergies    Past Medical History:   Diagnosis Date    Anxiety     Anxiety with depression     Breast cancer [facility])     December 2019 - right sided    [facility] cancer ([facility] [address] )     Depression     Diabetes mellitus ([facility] )     Hypertension     Port-A-Cath in place     Left    TIA (transient ischemic attack)     only a possible diagnosis    TIA (transient ischemic attack)     "possible"       Past Surgical History:   Procedure Laterality Date    AXILLARY NODE DISSECTION Bilateral     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    AXILLARY NODE DISSECTION      CHOLECYSTECTOMY      FOOT SURGERY      IR PORT PLACEMENT      SIMPLE MASTECTOMY Right     TONSILLECTOMY      TONSILLECTOMY AND ADENOIDECTOMY      WISDOM TOOTH EXTRACTION      WRIST SURGERY Right        Family History   Problem Relation Age of Onset    No Known Problems Mother        Social History     Socioeconomic History    Marital status: Unknown     Spouse name: None    Number of children: None    Years of education: None    Highest education level: None   Occupational History    None   Tobacco Use    Smoking status: Former Smoker     Packs/day: 0 25     Years: 62 00     Pack years: 15 50     Types: Cigarettes     Start date:      Quit date: 10/25/2020     Years since quittin 3    Smokeless tobacco: Never Used    Tobacco comment: stopped smoking 4 weeks ago   Vaping Use    Vaping Use: Former    Start date: 1/3/1958   Arabella Hurst Quit date: 2020   Substance and Sexual Activity    Alcohol use: Never    Drug use: Never    Sexual activity: None   Other Topics Concern    None   Social History Narrative    ** Merged History Encounter **         Daily caffeine use- 1 cup of coffee     Social Determinants of Health     Financial Resource Strain: Not on file   Food Insecurity: No Food Insecurity    Worried About Running Out of Food in the Last Year: Never true    Jenna of Food in the Last Year: Never true   Transportation Needs: No Transportation Needs    Lack of Transportation (Medical): No    Lack of Transportation (Non-Medical):  No   Physical Activity: Not on file   Stress: Not on file   Social Connections: Not on file   Intimate Partner Violence: Not on file   Housing Stability: Low Risk     Unable to Pay for Housing in the Last Year: No    Number of Places Lived in the Last Year: 1    Unstable Housing in the Last Year: No         Current Outpatient Medications:     amLODIPine (NORVASC) 10 mg tablet, Take 1 tablet (10 mg total) by mouth daily, Disp: 30 tablet, Rfl: 0    anastrozole (ARIMIDEX) 1 mg tablet, Take 1 mg by mouth daily, Disp: , Rfl:     apixaban (Eliquis) 2 5 mg, Take by mouth 2 (two) times a day, Disp: , Rfl:     aspirin (ECOTRIN LOW STRENGTH) 81 mg EC tablet, Take 81 mg by mouth daily, Disp: , Rfl:     atenolol (TENORMIN) 100 mg tablet, Take by mouth, Disp: , Rfl:     atorvastatin (LIPITOR) 80 mg tablet, Take 80 mg by mouth daily, Disp: , Rfl:     calcitriol (ROCALTROL) 0 25 mcg capsule, Take 0 25 mcg by mouth daily, Disp: , Rfl:     cholecalciferol (VITAMIN D3) 1,000 units tablet, Take 5,000 Units by mouth 3 (three) times a week, Disp: , Rfl:     famotidine (PEPCID) 20 mg tablet, Take 20 mg by mouth 2 (two) times a day, Disp: , Rfl:     LINAGLIPTIN PO, Take 5 mg by mouth, Disp: , Rfl:     magnesium gluconate (MAGONATE) 500 mg tablet, Take 500 mg by mouth daily, Disp: , Rfl:     omeprazole (PriLOSEC) 40 MG capsule, Take 40 mg by mouth daily, Disp: , Rfl:     PARoxetine (PAXIL) 40 MG tablet, Take by mouth, Disp: , Rfl:     repaglinide (PRANDIN) 1 mg tablet, TAKE 1 TAB BY MOUTH THREE TIMES A DAY BEFORE MEALS , Disp: , Rfl: 3    No Known Allergies    Review of Systems   Constitutional: Negative for chills, fever and unexpected weight change  HENT: Negative for hearing loss, nosebleeds and sore throat  Eyes: Negative for pain, redness and visual disturbance  Respiratory: Negative for cough, shortness of breath and wheezing  Cardiovascular: Negative for chest pain, palpitations and leg swelling  Gastrointestinal: Negative for abdominal pain, nausea and vomiting  Endocrine: Negative for polydipsia and polyuria  Genitourinary: Negative for dysuria and hematuria  Musculoskeletal:        As noted in HPI   Skin: Negative for rash and wound  Neurological: Negative for dizziness, numbness and headaches  Psychiatric/Behavioral: Negative for decreased concentration and suicidal ideas  The patient is not nervous/anxious  Objective:  Ht 5' 3" (1 6 m)   Wt 74 4 kg (164 lb)   BMI 29 05 kg/m²     Ortho Exam  Right arm -   Patient presents with long arm Ortho Glass splint and shoulder sling in place at time of visit  Splint was not removed at time of visit    She demonstrates significant soft tissue swelling and bruising extending into the forearm and hand  She is tender to palpation over proximal humerus  FDS, FDP, and extensor mechanisms are intact  Sensation light touch is intact distally    Physical Exam  Vitals and nursing note reviewed  Constitutional:       General: She is not in acute distress  Appearance: She is well-developed  HENT:      Head: Normocephalic and atraumatic  Eyes:      Conjunctiva/sclera: Conjunctivae normal    Cardiovascular:      Rate and Rhythm: Normal rate  Pulmonary:      Effort: Pulmonary effort is normal    Musculoskeletal:      Cervical back: Neck supple  Skin:     General: Skin is warm and dry  Capillary Refill: Capillary refill takes less than 2 seconds  Neurological:      Mental Status: She is alert and oriented to person, place, and time  Psychiatric:         Mood and Affect: Mood normal          Behavior: Behavior normal         Diagnostic Test Review:  Attending Physician has personally reviewed pertinent imaging in PACS, impression is as follows:    Review of radiographic series taken 2/21/2022 of the right shoulder shows residual angulation of comminuted fracture of the proximal humerus, but alignment is overall improved as compared to previous imaging  Fracture / Dislocation Treatment    Date/Time: 2/21/2022 4:04 PM  Performed by: Bryan Hamm DO  Authorized by: Bryan Hamm DO     Patient Location:  Clinic  Other Assisting Provider: No    Verbal consent obtained?: Yes    Risks and benefits: Risks, benefits and alternatives were discussed    Consent given by:  Patient  Patient states understanding of procedure being performed: Yes    Radiology Images displayed and confirmed   If images not available, report reviewed: Yes    Patient identity confirmed:  Verbally with patient  Injury location:  Upper arm  Location details:  Right upper arm  Injury type:  Fracture  Fracture type: humeral shaft    Neurovascular status: Neurovascularly intact    Range of motion: reduced    Local anesthesia used?: No    Manipulation performed?: No    Immobilization:  Splint  Splint type:  Long arm  Supplies used:  Cotton padding, Ortho-Glass and elastic bandage  Neurovascular status: Neurovascularly intact    Range of motion: unchanged    Patient tolerance:  Patient tolerated the procedure well with no immediate complications         Scribe Attestation    I,:  Radha Sun am acting as a scribe while in the presence of the attending physician :       I,:  Sanjeev Novak, DO personally performed the services described in this documentation    as scribed in my presence :

## 2022-02-22 NOTE — TELEPHONE ENCOUNTER
Louise Devlin from [facility] is with the patient now, she has R arm pain, rating it a 9 out of 10  Her cast was put on this morning  She is asking for something stronger than tylenol for pain regiment  Please advise  Preferred pharmacy is [facility] on [address] (the 2nd one on file)    [facility] [address] [facility], or call the patient directly ([phone])

## 2022-03-14 NOTE — PROGRESS NOTES
Assessment/Plan:  Assessment/Plan   Diagnoses and all orders for this visit:    Closed displaced transverse fracture of shaft of right humerus with routine healing, subsequent encounter  -     DXA bone density spine hip and pelvis; Future  -     XR humerus right; Future    Reviewed today's physical exam findings and x-ray findings with patient at time of visit  Today's x-rays demonstrate improved fracture reduction alignment in the clamshell brace with early signs of callus formation consistent with routine healing  As adequate reduction is observed on x-ray, she will continue conservative management keeping the clamshell brace in place for an additional 4 weeks  She is instructed to stay in the immobilizer as much as possible, only removing the immobilizer for hygiene purposes, but the clamshell brace should remain in place at all times  She will be seen for follow-up in 4 weeks for re-evaluation, and repeat x-rays two views right humerus  She can continue NSAIDs/Tylenol as needed for pain and soreness  Patient expresses understanding is in agreement with this treatment plan  The alignment has greatly improved  There is a start of early callus formation  Would recommend continuation of fracture brace and sling  Return back in 1 month for re-evaluation with new x-rays of right humerus-two views  If her condition changes, she will not hesitate to let us know  Physical examination shows the swelling to have dramatically decreased  Good dorsiflexion of the wrist   Good thumb extension as well  Subjective:   Patient ID: Beatrice Son is a 76 y o  female  HPI  Patient presents for follow-up evaluation 4 weeks s/p right midshaft humerus fracture sustained 2/13/2022  Patient reports that she has been consistent with use of the provided shoulder immobilizer  She reports only mild soreness at rest   She denies any recent onset bruising, swelling, numbness, or tingling    She does note that she is beginning to experience some skin irritation under the left axilla due to the straps for the clamshell brace  The following portions of the patient's history were reviewed and updated as appropriate: allergies, current medications, past family history, past medical history, past social history, past surgical history and problem list     Review of Systems   Constitutional: Negative for chills, fever and unexpected weight change  HENT: Negative for hearing loss, nosebleeds and sore throat  Eyes: Negative for pain, redness and visual disturbance  Respiratory: Negative for cough, shortness of breath and wheezing  Cardiovascular: Negative for chest pain, palpitations and leg swelling  Gastrointestinal: Negative for abdominal pain, nausea and vomiting  Endocrine: Negative for polydipsia and polyuria  Genitourinary: Negative for dysuria and hematuria  Musculoskeletal:        As noted in HPI   Skin: Negative for rash and wound  Neurological: Negative for dizziness, numbness and headaches  Psychiatric/Behavioral: Negative for decreased concentration and suicidal ideas  The patient is not nervous/anxious          Objective:  /60 (BP Location: Left arm, Patient Position: Sitting, Cuff Size: Standard)   Pulse 83   Ht 5' 3" (1 6 m)   SpO2 97%   BMI 29 05 kg/m²     Ortho Exam   Right upper arm -   Minimal anatomical deformity  Skin is warm and dry to touch with no signs of erythema, ecchymosis, or infection  No soft tissue swelling or effusion noted  No injury site crepitus with passive motion  TTP over midshaft humerus  ROM  Deferred  MMT 5/5 finger abduction/adduction, 5/5 wrist flexion and extension  Demonstrates normal elbow, wrist, and finger motion  2+ distal radial pulse with brisk capillary refill to the fingers  Radial, median, and ulnar motor and sensory distributions intact  Sensation light touch intact distally      Physical Exam  Constitutional:       Appearance: She is well-developed  HENT:      Right Ear: External ear normal       Left Ear: External ear normal       Nose: Nose normal    Eyes:      Conjunctiva/sclera: Conjunctivae normal       Pupils: Pupils are equal, round, and reactive to light  Pulmonary:      Effort: Pulmonary effort is normal    Musculoskeletal:         General: Normal range of motion  Cervical back: Normal range of motion  Skin:     General: Skin is warm and dry  Neurological:      Mental Status: She is alert and oriented to person, place, and time  Psychiatric:         Behavior: Behavior normal          Thought Content:  Thought content normal          Judgment: Judgment normal           Diagnostic Test Review:  Attending Physician has personally reviewed pertinent imaging in PACS, impression is as follows:    Review of radiographic series taken 3/14/2022 of the right shoulder shows acceptable fracture reduction within clamshell brace and early signs of callus formation consistent with routine healing      Scribe Attestation    I,:  Sony Carter am acting as a scribe while in the presence of the attending physician :       I,:  Fabienne Martinez, DO personally performed the services described in this documentation    as scribed in my presence :

## 2022-04-12 NOTE — PROGRESS NOTES
ASSESSMENT/PLAN:    Diagnoses and all orders for this visit:    Closed displaced transverse fracture of shaft of right humerus with routine healing, subsequent encounter  -     XR humerus right; Future        X-rays of the patient's right humerus show the fracture to be healing  There is good callus formation  The fracture is healing in acceptable alignment  She no longer needs to use the clamshell brace, but she should continue using the sling  She can work on gentle range of motion with pendulums and wall climbs  She will follow up with our office in 6 weeks with new x-rays of her right humerus two views  She is acceptable to this plan  Return in about 6 weeks (around 5/24/2022)  The patient is doing quite well  The fracture is progressively healing  She was instructed on a passive range of motion program   The brace will be discontinued  Return back in 6 weeks re-evaluation with new x-rays of right humerus-two views  If her condition changes, she will not hesitate to let us know  _____________________________________________________  CHIEF COMPLAINT:  Chief Complaint   Patient presents with    Right Shoulder - Fracture, Follow-up         SUBJECTIVE:  Thelma Valentine is a 76 y o  female who presents to our office for a follow-up visit  The patient is status post right midshaft humerus fracture from 02/13/2022  She has been using the shoulder immobilizer and clamshell brace  She does complain of some occasional pain  She denies any numbness or tingling  She denies any fever or chills      The following portions of the patient's history were reviewed and updated as appropriate: allergies, current medications, past family history, past medical history, past social history, past surgical history and problem list     PAST MEDICAL HISTORY:  Past Medical History:   Diagnosis Date    Anxiety     Anxiety with depression     Breast cancer [facility])     December 2019 - right sided    [facility] ([facility] [address] )  Depression     [facility] mellitus [facility] [address] [facility]     Hypertension     Port-A-Cath in place     Left    TIA (transient ischemic attack)     only a possible diagnosis    TIA (transient ischemic attack)     "possible"       PAST SURGICAL HISTORY:  Past Surgical History:   Procedure Laterality Date    AXILLARY NODE DISSECTION Bilateral     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    AXILLARY NODE DISSECTION      CHOLECYSTECTOMY      FOOT SURGERY      IR PORT PLACEMENT      REMOVAL VENOUS PORT (PORT-A-CATH)  2022    SIMPLE MASTECTOMY Right     TONSILLECTOMY      TONSILLECTOMY AND ADENOIDECTOMY      WISDOM TOOTH EXTRACTION      WRIST SURGERY Right        FAMILY HISTORY:  Family History   Problem Relation Age of Onset    No Known Problems Mother        SOCIAL HISTORY:  Social History     Tobacco Use    Smoking status: Former Smoker     Packs/day: 0 25     Years: 62 00     Pack years: 15 50     Types: Cigarettes     Start date:      Quit date: 10/25/2020     Years since quittin 4    Smokeless tobacco: Never Used    Tobacco comment: stopped smoking 4 weeks ago   Vaping Use    Vaping Use: Former    Start date: 1/3/1958   Hugo Sloan Quit date: 2020   Substance Use Topics    Alcohol use: Never    Drug use: Never       MEDICATIONS:    Current Outpatient Medications:     amLODIPine (NORVASC) 10 mg tablet, Take 1 tablet (10 mg total) by mouth daily, Disp: 30 tablet, Rfl: 0    anastrozole (ARIMIDEX) 1 mg tablet, Take 1 mg by mouth daily, Disp: , Rfl:     apixaban (Eliquis) 2 5 mg, Take by mouth 2 (two) times a day, Disp: , Rfl:     aspirin (ECOTRIN LOW STRENGTH) 81 mg EC tablet, Take 81 mg by mouth daily, Disp: , Rfl:     atenolol (TENORMIN) 100 mg tablet, Take by mouth, Disp: , Rfl:     atorvastatin (LIPITOR) 80 mg tablet, Take 80 mg by mouth daily, Disp: , Rfl:     calcitriol (ROCALTROL) 0 25 mcg capsule, Take 0 25 mcg by mouth daily, Disp: , Rfl:     cholecalciferol (VITAMIN D3) 1,000 units tablet, Take 5,000 Units by mouth 3 (three) times a week, Disp: , Rfl:     famotidine (PEPCID) 20 mg tablet, Take 20 mg by mouth 2 (two) times a day, Disp: , Rfl:     LINAGLIPTIN PO, Take 5 mg by mouth, Disp: , Rfl:     magnesium gluconate (MAGONATE) 500 mg tablet, Take 500 mg by mouth daily, Disp: , Rfl:     omeprazole (PriLOSEC) 40 MG capsule, Take 40 mg by mouth daily, Disp: , Rfl:     PARoxetine (PAXIL) 40 MG tablet, Take by mouth, Disp: , Rfl:     repaglinide (PRANDIN) 1 mg tablet, TAKE 1 TAB BY MOUTH THREE TIMES A DAY BEFORE MEALS , Disp: , Rfl: 3    traMADol (Ultram) 50 mg tablet, Take 1 tablet (50 mg total) by mouth every 6 (six) hours as needed for moderate pain (Patient not taking: Reported on 3/14/2022 ), Disp: 30 tablet, Rfl: 0    ALLERGIES:  No Known Allergies    ROS:  Review of Systems     Constitutional: Negative for fatigue, fever or loss of appetite  HENT: Negative  Respiratory: Negative for shortness of breath, dyspnea  Cardiovascular: Negative for chest pain/tightness  Gastrointestinal: Negative for abdominal pain, N/V  Endocrine: Negative for cold/heat intolerance, unexplained weight loss/gain  Genitourinary: Negative for flank pain, dysuria, hematuria  Musculoskeletal: Positive for arthralgia   Skin: Negative for rash  Neurological: Negative for numbness or tingling  Psychiatric/Behavioral: Negative for agitation  _____________________________________________________  PHYSICAL EXAMINATION:    There were no vitals taken for this visit  Constitutional: Oriented to person, place, and time  Appears well-developed and well-nourished  No distress  HENT:   Head: Normocephalic  Eyes: Conjunctivae are normal  Right eye exhibits no discharge  Left eye exhibits no discharge  No scleral icterus  Cardiovascular: Normal rate  Pulmonary/Chest: Effort normal    Neurological: Alert and oriented to person, place, and time  Skin: Skin is warm and dry  No rash noted  Not diaphoretic  No erythema  No pallor  Psychiatric: Normal mood and affect  Behavior is normal  Judgment and thought content normal       MUSCULOSKELETAL EXAMINATION:   Physical Exam  Ortho Exam    Right upper extremity is neurovascularly intact  Fingers are pink and mobile  Compartments are soft  No tenderness to palpation along fracture site  The fracture is moving as 1 unit  Brisk cap refill  Range of motion of the shoulder is improved  Sensation intact  Objective:  BP Readings from Last 1 Encounters:   03/14/22 126/60      Wt Readings from Last 1 Encounters:   02/21/22 74 4 kg (164 lb)        BMI:   Estimated body mass index is 29 05 kg/m² as calculated from the following:    Height as of 3/14/22: 5' 3" (1 6 m)  Weight as of 2/21/22: 74 4 kg (164 lb)        Scribe Attestation    I,:  Soheila Oh PA-C am acting as a scribe while in the presence of the attending physician :       I,:  Lizette Palacios, DO personally performed the services described in this documentation    as scribed in my presence :

## 2022-04-26 NOTE — ED PROVIDER NOTES
Emergency Department Trauma Note  Thomas Rutledge 76 y o  female MRN: [id number]  Unit/Bed#: ED 25/ED 25 Encounter: [id number]      Trauma Alert: Trauma Acuity: Trauma Evaluation  Model of Arrival: Mode of Arrival: ALS via    Trauma Team: Current Providers  Attending Provider: Chan Parkinson DO  Registered Nurse: Bishop Marie RN  Consultants:     None      History of Present Illness     Chief Complaint:   Chief Complaint   Patient presents with    Fall     HPI:  Thomas Rutledge is a 76 y o  female who presents with fall  Mechanism:Details of Incident: Patient reports falling this evening  Injury Date: 04/26/22   Injury Occurence Location - [address] [place] [address] Way: In the home     66-year-old female with history of left-sided DVT, on Eliquis, states 3 lung cancer, presenting after fall  Patient states she was in the kitchen and she lost her footing and fell onto her left forearm  Patient was recently seen for a midshaft right humerus fracture  Patient struck the back her head on the counter  She denies LOC, headache, nausea or vomiting  She denies weakness or numbness  Head Injury w/unknown LOC  Mechanism of injury: fall    Fall:     Point of impact:  Head  Pain details:     Quality:  Dull    Severity:  Mild    Timing:  Constant  Chronicity:  New  Associated symptoms: no nausea, no numbness and no vomiting      Review of Systems   Constitutional: Negative for chills and fever  HENT: Negative for congestion, nosebleeds, rhinorrhea and sore throat  Eyes: Negative for pain and visual disturbance  Respiratory: Negative for cough and wheezing  Cardiovascular: Negative for chest pain and leg swelling  Gastrointestinal: Negative for abdominal distention, abdominal pain, diarrhea, nausea and vomiting  Genitourinary: Negative for dysuria and frequency  Musculoskeletal: Negative for back pain and joint swelling  Skin: Negative for rash and wound  Neurological: Negative for weakness and numbness  Psychiatric/Behavioral: Negative for decreased concentration and suicidal ideas         Historical Information     Immunizations:   Immunization History   Administered Date(s) Administered    COVID-19 PFIZER VACCINE 0 3 ML IM 2021, 2021, 2021    INFLUENZA 10/24/2014, 2015, 10/13/2016, 10/02/2017, 10/24/2017, 10/01/2018, 10/03/2019    Pneumococcal Conjugate 13-Valent 2015    Pneumococcal Polysaccharide PPV23 2014, 10/01/2018    Tdap 2012    Zoster 2014       Past Medical History:   Diagnosis Date    Anxiety     Anxiety with depression     [facility])     2019 - right sided    Breast cancer ([facility] [address] )     Depression     Diabetes mellitus ([facility] )     Hypertension     Port-A-Cath in place     Left    TIA (transient ischemic attack)     only a possible diagnosis    TIA (transient ischemic attack)     "possible"       Family History   Problem Relation Age of Onset    No Known Problems Mother      Past Surgical History:   Procedure Laterality Date    AXILLARY NODE DISSECTION Bilateral     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    AXILLARY NODE DISSECTION      CHOLECYSTECTOMY      FOOT SURGERY      IR PORT PLACEMENT      REMOVAL VENOUS PORT (PORT-A-CATH)  2022    SIMPLE MASTECTOMY Right     TONSILLECTOMY      TONSILLECTOMY AND ADENOIDECTOMY      WISDOM TOOTH EXTRACTION      WRIST SURGERY Right      Social History     Tobacco Use    Smoking status: Former Smoker     Packs/day: 0 25     Years: 62 00     Pack years: 15 50     Types: Cigarettes     Start date: 78     Quit date: 10/25/2020     Years since quittin 5    Smokeless tobacco: Never Used    Tobacco comment: stopped smoking 4 weeks ago   Vaping Use    Vaping Use: Former    Start date: 1/3/1958   Jorje Polo Quit date: 2020   Substance Use Topics    Alcohol use: Never    Drug use: Never     E-Cigarette/Vaping    E-Cigarette Use Former User     Start Date 1/3/1958     Quit Date 11/4/20      E-Cigarette/Vaping Substances    Nicotine No     THC No     CBD No     Flavoring No     Other No     Unknown No        Family History:   Family History   Problem Relation Age of Onset    No Known Problems Mother        Meds/Allergies   Prior to Admission Medications   Prescriptions Last Dose Informant Patient Reported? Taking?    LINAGLIPTIN PO  Child Yes No   Sig: Take 5 mg by mouth   PARoxetine (PAXIL) 40 MG tablet  Child Yes No   Sig: Take by mouth   amLODIPine (NORVASC) 10 mg tablet   No No   Sig: Take 1 tablet (10 mg total) by mouth daily   anastrozole (ARIMIDEX) 1 mg tablet   Yes No   Sig: Take 1 mg by mouth daily   apixaban (Eliquis) 2 5 mg   Yes No   Sig: Take by mouth 2 (two) times a day   aspirin (ECOTRIN LOW STRENGTH) 81 mg EC tablet  Child Yes No   Sig: Take 81 mg by mouth daily   atenolol (TENORMIN) 100 mg tablet  Child Yes No   Sig: Take by mouth   atorvastatin (LIPITOR) 80 mg tablet  Child Yes No   Sig: Take 80 mg by mouth daily   calcitriol (ROCALTROL) 0 25 mcg capsule   Yes No   Sig: Take 0 25 mcg by mouth daily   cholecalciferol (VITAMIN D3) 1,000 units tablet  Child Yes No   Sig: Take 5,000 Units by mouth 3 (three) times a week   famotidine (PEPCID) 20 mg tablet  Child Yes No   Sig: Take 20 mg by mouth 2 (two) times a day   magnesium gluconate (MAGONATE) 500 mg tablet   Yes No   Sig: Take 500 mg by mouth daily   omeprazole (PriLOSEC) 40 MG capsule  Child Yes No   Sig: Take 40 mg by mouth daily   repaglinide (PRANDIN) 1 mg tablet  Child Yes No   Sig: TAKE 1 TAB BY MOUTH THREE TIMES A DAY BEFORE MEALS    traMADol (Ultram) 50 mg tablet   No No   Sig: Take 1 tablet (50 mg total) by mouth every 6 (six) hours as needed for moderate pain   Patient not taking: Reported on 3/14/2022       Facility-Administered Medications: None       No Known Allergies    PHYSICAL EXAM    PE limited by: none    Objective   Vitals:   First set: Temperature: 97 9 °F (36 6 °C) (04/26/22 1830)  Pulse: 88 (04/26/22 1832)  Respirations: 16 (04/26/22 1832)  Blood Pressure: (!) 226/106 (04/26/22 1832)  SpO2: 95 % (04/26/22 1832)    Primary Survey:   (A) Airway: intact  (B) Breathing: intact  (C) Circulation: Pulses:   normal  (D) Disabliity:  GCS Total:  15  (E) Expose:  Completed    Secondary Survey: (Click on Physical Exam tab above)  Physical Exam  Constitutional:       Appearance: She is well-developed  HENT:      Head: Normocephalic  Comments: Soft tissue swelling posterior scalp without laceration     Mouth/Throat:      Comments: Superficial laceration to the left tongue, not bleeding  Eyes:      Conjunctiva/sclera: Conjunctivae normal       Pupils: Pupils are equal, round, and reactive to light  Neck:      Trachea: No tracheal deviation  Cardiovascular:      Rate and Rhythm: Normal rate and regular rhythm  Heart sounds: Normal heart sounds  No murmur heard  Pulmonary:      Effort: Pulmonary effort is normal  No respiratory distress  Breath sounds: Normal breath sounds  No wheezing or rales  Abdominal:      General: Bowel sounds are normal  There is no distension  Palpations: Abdomen is soft  Tenderness: There is no abdominal tenderness  Musculoskeletal:         General: No deformity  Cervical back: Normal range of motion and neck supple  Comments: Right-sided mid shaft humerus tenderness, no bony tenderness elsewhere   Skin:     General: Skin is warm and dry  Capillary Refill: Capillary refill takes less than 2 seconds  Comments: 15 cm skin tear to the left volar distal upper extremity   Neurological:      Mental Status: She is alert and oriented to person, place, and time  Sensory: No sensory deficit  Psychiatric:         Judgment: Judgment normal          Cervical spine cleared by clinical criteria?  Yes     Invasive Devices  Report    None                 Lab Results:   Results Reviewed     None                 Imaging Studies:   Direct to CT: No  TRAUMA - CT head wo contrast   Final Result by Kristen Damon MD (04/26 1853)      No acute intracranial abnormality  Workstation performed: [id number]         XR Trauma chest portable   ED Interpretation by Neptali Vega DO (04/26 1851)   Mass left lobe c/c known lung ca  No ptx/obvious fractures            Procedures  Laceration repair    Date/Time: 4/26/2022 8:59 PM  Performed by: Neptali Vega DO  Authorized by: Neptali Vega DO   Consent: Verbal consent obtained  Risks and benefits: risks, benefits and alternatives were discussed  Consent given by: patient  Required items: required blood products, implants, devices, and special equipment available  Patient identity confirmed: verbally with patient  Time out: Immediately prior to procedure a "time out" was called to verify the correct patient, procedure, equipment, support staff and site/side marked as required  Laceration length: 15 cm  Tendon involvement: none  Nerve involvement: none    Wound Dehiscence:  Superficial Wound Dehiscence: simple closure      Procedure Details:  Skin closure: Steri-Strips  Number of sutures: 10  Approximation: loose  Approximation difficulty: simple  Dressing: 4x4 sterile gauze and gauze roll  Patient tolerance: patient tolerated the procedure well with no immediate complications               ED Course           MDM  Number of Diagnoses or Management Options  Fall, initial encounter: new and requires workup  Injury of head, initial encounter: new and requires workup  Skin tear of forearm without complication: new and requires workup  Diagnosis management comments: 77-year-old female presents after mechanical fall from standing, she is on Eliquis for DVT    Trauma evaluation called prior to patient arrival, was present and room prior to patient arrival in the ER  No acute life-threatening injuries found on primary survey,  Small air swelling on posterior of her head, right shoulder tenderness which is unchanged from recent dislocation  Long skin tear to the left forearm, bleeding intact  C-spine negative, nexus criteria negative, no neck pain       Amount and/or Complexity of Data Reviewed  Review and summarize past medical records: yes  Independent visualization of images, tracings, or specimens: yes    Risk of Complications, Morbidity, and/or Mortality  Presenting problems: high  Diagnostic procedures: minimal  Management options: high            Disposition  [facility] Transfer: No  Final diagnoses:   Injury of head, initial encounter   Skin tear of forearm without complication   Fall, initial encounter     Time reflects when diagnosis was documented in both MDM as applicable and the Disposition within this note     Time User Action Codes Description Comment    4/26/2022  7:17 PM Leeanne Espinosa Add [S09 90XA] Injury of head, initial encounter     4/26/2022  7:17 PM Leeanne Espinosa Add [G63 307R] Skin tear of forearm without complication     2/86/4689  7:17 PM Antoinetteamae Pare Add Devyn Barnett, initial encounter       ED Disposition     ED Disposition Condition Date/Time Comment    Discharge Stable Tue Apr 26, 2022  7:17 PM Selnee Thomson discharge to home/self care              Follow-up Information     Follow up With Specialties Details Why Contact Info Additional Information    Cynomayra Blood, DO Family Medicine Schedule an appointment as soon as possible for a visit   [address] [facility] [address] [address] [facility] [address]  [phone]       [address], [address] [place] [address] Darrell Peterson   [phone]        Discharge Medication List as of 4/26/2022  7:20 PM      CONTINUE these medications which have NOT CHANGED    Details   amLODIPine (NORVASC) 10 mg tablet Take 1 tablet (10 mg total) by mouth daily, Starting Sat 11/28/2020, Print      anastrozole (ARIMIDEX) 1 mg tablet Take 1 mg by mouth daily, Historical Med      apixaban (Eliquis) 2 5 mg Take by mouth 2 (two) times a day, Historical Med      aspirin (ECOTRIN LOW STRENGTH) 81 mg EC tablet Take 81 mg by mouth daily, Historical Med      atenolol (TENORMIN) 100 mg tablet Take by mouth, Historical Med      atorvastatin (LIPITOR) 80 mg tablet Take 80 mg by mouth daily, Historical Med      calcitriol (ROCALTROL) 0 25 mcg capsule Take 0 25 mcg by mouth daily, Historical Med      cholecalciferol (VITAMIN D3) 1,000 units tablet Take 5,000 Units by mouth 3 (three) times a week, Historical Med      famotidine (PEPCID) 20 mg tablet Take 20 mg by mouth 2 (two) times a day, Historical Med      LINAGLIPTIN PO Take 5 mg by mouth, Historical Med      magnesium gluconate (MAGONATE) 500 mg tablet Take 500 mg by mouth daily, Historical Med      omeprazole (PriLOSEC) 40 MG capsule Take 40 mg by mouth daily, Historical Med      PARoxetine (PAXIL) 40 MG tablet Take by mouth, Historical Med      repaglinide (PRANDIN) 1 mg tablet TAKE 1 TAB BY MOUTH THREE TIMES A DAY BEFORE MEALS , Historical Med      traMADol (Ultram) 50 mg tablet Take 1 tablet (50 mg total) by mouth every 6 (six) hours as needed for moderate pain, Starting Tue 2/22/2022, Normal               PDMP Review       Value Time User    PDMP Reviewed  Yes 9/22/2019  9:52 AM Yessica Ramsey [address] [place] [address] Provider  Electronically Signed by         Gita Cedeno DO  04/26/22 2100

## 2022-04-26 NOTE — ED NOTES
Provider in with pt and family discussing findings and family concerns     Doc Greenwood RN  04/26/22 9004

## 2022-04-26 NOTE — DISCHARGE INSTRUCTIONS
Change dressing daily   Keep steri-strips on until they fall off  Follow up with wound care for further care  Return if you develop symptoms of infection

## 2022-05-11 PROBLEM — R91.8 LUNG MASS: Status: ACTIVE | Noted: 2022-01-01

## 2022-05-11 NOTE — ASSESSMENT & PLAN NOTE
Chest x-ray on admission shows left paramediastinal mass, left upper lobe mass and left pleural effusion  Patient follows Oncology at 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

## 2022-05-11 NOTE — ASSESSMENT & PLAN NOTE
Lab Results   Component Value Date    HGBA1C 7 7 (H) 12/18/2020     Hold home p o   Diabetic medication  Check A1c, sliding scale and Accu-Cheks

## 2022-05-11 NOTE — H&P
[address]  [facility]PM[facility] 1945, 76 y o  female MRN: [id number]  Unit/Bed#: ED 25 Encounter: [id number]  Primary Care Provider: Filiberto Henriquez DO   Date and time admitted to hospital: 5/11/2022  1:44 PM    * Syncope  Assessment & Plan  Patient daughter reports patient was recently diagnosed with small-cell lung cancer, currently seeking hospice  Has had poor p o  Intake over the past several days, upon getting up she reportedly had a syncopal spell  Daughter reports twitching, but patient awoken briefly after with no post-ictal symptoms  CT head negative  Chest x-ray showing lung mass  In the ED, patient's initial blood pressure was 120 systolic, upon sitting 433, upon standing 100  Will hold further blood pressure medications, atenolol, Norvasc  Cardiology consulted, will defer echo to Cardiology  Monitor on telemetry, initial troponin negative, EKG NSR without ischemic changes, pt denies cp  PT and OT to evaluate  Check orthostatic vitals  Status post 1 L NS, will give gentle fluids on 75 cc/hour        Lung mass  Assessment & Plan  Chest x-ray on admission shows left paramediastinal mass, left upper lobe mass and left pleural effusion  Patient follows Oncology at [facility] to review documentation taking care everywhere  Per daughter, reports diagnosis of small-cell lung cancer, recently had bed port removed declining further chemotherapy  Daughter is currently in talks with [facility] hospice  Case mgmt consulted for further review    Type 2 diabetes mellitus without complication, without long-term current use of insulin [facility])  Assessment & Plan  Lab Results   Component Value Date    HGBA1C 7 7 (H) 12/18/2020     Hold home p o   Diabetic medication  Check A1c, sliding scale and Accu-Cheks    Stage 3 chronic kidney disease [facility])  Assessment & Plan  Lab Results   Component Value Date    EGFR 36 05/11/2022    EGFR 30 02/17/2022    EGFR 26 02/14/2022    CREATININE 1 39 (H) 05/11/2022    CREATININE 1 60 (H) 02/17/2022    CREATININE 1 79 (H) 02/14/2022     Renal functions appear to be at baseline  Will continue monitor at this time, will give gentle IV fluids given orthostatic hypotension    DVT (deep venous thrombosis) (HCC)  Assessment & Plan  History of DVT, on Eliquis 2 5 b i d , will continue    History of breast cancer  Assessment & Plan  Patient reports history of breast cancer status post resection and chemotherapy  Currently in surveillance, follows Oncology at [facility]    VTE Prophylaxis: Apixaban (Eliquis)  / sequential compression device   Code Status: DNR/DNI  POLST: There is no POLST form on file for this patient (pre-hospital)  Discussion with family: daughter    Anticipated Length of Stay:  Patient will be admitted on an Observation basis with an anticipated length of stay of 2 midnights  Justification for Hospital Stay: IVFs, cardiology evaluation for positive orthostatic    Total Time for Visit, including Counseling / Coordination of Care: 45 minutes  Greater than 50% of this total time spent on direct patient counseling and coordination of care  Chief Complaint:   Syncope    History of Present Illness:    Pierre Britt is a 76 y o  female who presents with dizziness reports of syncope  Patient has past medical history of breast cancer, newly diagnosed lung cancer primary, hypertension, diabetes and DVT  Patient is a poor historian, most history is obtained via daughter  Patient currently lives with the daughter at home  Recently was diagnosed with small cell lung cancer, follows Oncology at [facility] and opted to discontinue chemotherapy  She currently is seeking hospice for there mother and is in talks with [facility] team   Patient has been physically declining, with decreased p o  Intake  She reports that for the past several days the patient has been laying in bed had not been eating or drinking very much    Today she states that she was hungry and was trying to sit up in bed, but proceeded to fall back  After she fell back, she noted that her body was twitching and her daughter was concern for possible seizure and called EMS  She immediately awoke and reports doing well shortly afterwards  She did take her blood pressure medications this morning has been compliant  Patient currently denies any chest pains, shortness breath, palpitations, diarrhea, dysuria  Review of Systems:    Review of Systems   Constitutional: Negative for activity change, appetite change, chills and fever  HENT: Negative for congestion, facial swelling, sinus pain and sore throat  Respiratory: Negative for cough, shortness of breath and wheezing  Cardiovascular: Negative for chest pain, palpitations and leg swelling  Gastrointestinal: Positive for nausea  Negative for abdominal distention, abdominal pain, constipation, diarrhea and vomiting  Endocrine: Negative for cold intolerance, heat intolerance, polydipsia, polyphagia and polyuria  Genitourinary: Negative for dysuria, flank pain and urgency  Skin: Negative for color change and pallor  Neurological: Negative for dizziness, syncope, weakness, light-headedness and headaches  Psychiatric/Behavioral: Negative for agitation, confusion and dysphoric mood         Past Medical and Surgical History:     Past Medical History:   Diagnosis Date    Anxiety     Anxiety with depression     Breast cancer [facility])     December 2019 - right sided    [facility] [address] [facility]     Depression     Diabetes mellitus [facility] [address] [facility]     Hypertension     Port-A-Cath in place     Left    TIA (transient ischemic attack)     only a possible diagnosis    TIA (transient ischemic attack)     "possible"       Past Surgical History:   Procedure Laterality Date    AXILLARY NODE DISSECTION Bilateral     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    AXILLARY NODE DISSECTION      CHOLECYSTECTOMY      FOOT SURGERY      IR PORT PLACEMENT      REMOVAL VENOUS PORT (PORT-A-CATH)  04/06/2022    SIMPLE MASTECTOMY Right     TONSILLECTOMY      TONSILLECTOMY AND ADENOIDECTOMY      WISDOM TOOTH EXTRACTION      WRIST SURGERY Right        Meds/Allergies:    Prior to Admission medications    Medication Sig Start Date End Date Taking? Authorizing Provider   amLODIPine (NORVASC) 10 mg tablet Take 1 tablet (10 mg total) by mouth daily 11/28/20   Edda Clark MD   apixaban (Eliquis) 2 5 mg Take by mouth 2 (two) times a day    Historical Provider, MD   atenolol (TENORMIN) 100 mg tablet Take by mouth    Historical Provider, MD   atorvastatin (LIPITOR) 80 mg tablet Take 80 mg by mouth daily    Historical Provider, MD   calcitriol (ROCALTROL) 0 25 mcg capsule Take 0 25 mcg by mouth daily    Historical Provider, MD   cholecalciferol (VITAMIN D3) 1,000 units tablet Take 5,000 Units by mouth 3 (three) times a week    Historical Provider, MD   famotidine (PEPCID) 20 mg tablet Take 20 mg by mouth 2 (two) times a day    Historical Provider, MD   LINAGLIPTIN PO Take 5 mg by mouth    Historical Provider, MD   magnesium gluconate (MAGONATE) 500 mg tablet Take 500 mg by mouth daily    Historical Provider, MD   omeprazole (PriLOSEC) 40 MG capsule Take 40 mg by mouth daily    Historical Provider, MD   PARoxetine (PAXIL) 40 MG tablet Take by mouth    Historical Provider, MD   repaglinide (PRANDIN) 1 mg tablet TAKE 1 TAB BY MOUTH THREE TIMES A DAY BEFORE MEALS  6/18/19   Historical Provider, MD   traMADol (Ultram) 50 mg tablet Take 1 tablet (50 mg total) by mouth every 6 (six) hours as needed for moderate pain  Patient not taking: Reported on 3/14/2022  2/22/22   Valencia Galvez DO   anastrozole (ARIMIDEX) 1 mg tablet Take 1 mg by mouth daily  5/11/22  Historical Provider, MD   aspirin (ECOTRIN LOW STRENGTH) 81 mg EC tablet Take 81 mg by mouth daily  5/11/22  Historical Provider, MD     I have reviewed home medications with patient personally      Allergies: No Known Allergies    Social History:     Marital Status: Unknown   Occupation: Retired  Patient Pre-hospital Living Situation: Home with daughter  Patient Pre-hospital Level of Mobility: unclear  Patient Pre-hospital Diet Restrictions: none  Substance Use History:   Social History     Substance and Sexual Activity   Alcohol Use Never     Social History     Tobacco Use   Smoking Status Former Smoker    Packs/day: 0 25    Years: 62 00    Pack years: 15 50    Types: Cigarettes    Start date: 78    Quit date: 10/25/2020    Years since quittin 5   Smokeless Tobacco Never Used   Tobacco Comment    stopped smoking 4 weeks ago     Social History     Substance and Sexual Activity   Drug Use Never       Family History:     Family History   Problem Relation Age of Onset    No Known Problems Mother        Physical Exam:     Vitals:   Blood Pressure: 104/55 (22 1545)  Pulse: 88 (22 1700)  Temperature: 97 8 °F (36 6 °C) (22 1346)  Respirations: 17 (22 1539)  SpO2: (!) 87 % (PATIENT not tolerating room air  usually wears 2 L only at night) (22 1700)    Physical Exam  Vitals and nursing note reviewed  Constitutional:       Appearance: Normal appearance  She is normal weight  HENT:      Head: Normocephalic and atraumatic  Eyes:      Conjunctiva/sclera: Conjunctivae normal    Cardiovascular:      Rate and Rhythm: Normal rate and regular rhythm  Heart sounds: Normal heart sounds  Pulmonary:      Breath sounds: Normal breath sounds  No wheezing or rhonchi  Abdominal:      General: Bowel sounds are normal  There is no distension  Palpations: Abdomen is soft  Tenderness: There is no abdominal tenderness  Musculoskeletal:         General: No swelling  Right lower leg: No edema  Left lower leg: No edema  Skin:     General: Skin is warm and dry  Neurological:      General: No focal deficit present  Mental Status: She is alert  Mental status is at baseline  Additional Data:     Lab Results: I have personally reviewed pertinent reports  Results from last 7 days   Lab Units 05/11/22  1354   WBC Thousand/uL 7 28   HEMOGLOBIN g/dL 10 1*   HEMATOCRIT % 32 6*   PLATELETS Thousands/uL 240   NEUTROS PCT % 65   LYMPHS PCT % 23   MONOS PCT % 10   EOS PCT % 2     Results from last 7 days   Lab Units 05/11/22  1354   SODIUM mmol/L 134*   POTASSIUM mmol/L 4 0   CHLORIDE mmol/L 100   CO2 mmol/L 27   BUN mg/dL 20   CREATININE mg/dL 1 39*   ANION GAP mmol/L 7   CALCIUM mg/dL 9 5   ALBUMIN g/dL 3 4*   TOTAL BILIRUBIN mg/dL 0 64   ALK PHOS U/L 109*   ALT U/L <3*   AST U/L 7*   GLUCOSE RANDOM mg/dL 171*                       Imaging: I have personally reviewed pertinent reports  No orders to display       EKG, Pathology, and Other Studies Reviewed on Admission:   · EKG: NSR    Allscripts / Epic Records Reviewed: Yes     ** Please Note: This note has been constructed using a voice recognition system   **

## 2022-05-11 NOTE — ED PROVIDER NOTES
History  Chief Complaint   Patient presents with    Dizziness     Patient reports dizziness  Patient was seen a few weeks ago for a previous issue  Patient daughter reports she stood her up to go to the bathroom and had a "seizure"  Patient reports she was conscious during this seizure  [de-identified] Year old female presents to the emergency department seeking evaluation for episodes of lightheadedness dizziness and near syncope that occurred when the patient is transitioning from laying or sitting to a standing position  These episodes also occurred when the patient stands for any length of time  Patient is known to have small cell lung cancer which she has elected to not treat at this time  Patient does require oxygen intermittently at home  Upon presentation in the emergency department she is well-appearing in no acute distress  At the time of presentation the patient is well-appearing in no acute distress  She is accompanied by her daughter who provides some history  Daughter states today there was an episode of syncope that occurred when the patient was attempting stand changing position  There was some mild full body twitching possibly representing seizure however the patient awoke after seconds and was alert oriented  Patient has no history of seizures  Allergies reviewed          Prior to Admission Medications   Prescriptions Last Dose Informant Patient Reported? Taking?    LINAGLIPTIN PO  Child Yes No   Sig: Take 5 mg by mouth   PARoxetine (PAXIL) 40 MG tablet  Child Yes No   Sig: Take by mouth   amLODIPine (NORVASC) 10 mg tablet   No No   Sig: Take 1 tablet (10 mg total) by mouth daily   apixaban (Eliquis) 2 5 mg   Yes No   Sig: Take by mouth 2 (two) times a day   atenolol (TENORMIN) 100 mg tablet  Child Yes No   Sig: Take by mouth   atorvastatin (LIPITOR) 80 mg tablet  Child Yes No   Sig: Take 80 mg by mouth daily   calcitriol (ROCALTROL) 0 25 mcg capsule   Yes No   Sig: Take 0 25 mcg by mouth daily   cholecalciferol (VITAMIN D3) 1,000 units tablet  Child Yes No   Sig: Take 5,000 Units by mouth 3 (three) times a week   famotidine (PEPCID) 20 mg tablet  Child Yes No   Sig: Take 20 mg by mouth 2 (two) times a day   magnesium gluconate (MAGONATE) 500 mg tablet   Yes No   Sig: Take 500 mg by mouth daily   omeprazole (PriLOSEC) 40 MG capsule  Child Yes No   Sig: Take 40 mg by mouth daily   repaglinide (PRANDIN) 1 mg tablet  Child Yes No   Sig: TAKE 1 TAB BY MOUTH THREE TIMES A DAY BEFORE MEALS    traMADol (Ultram) 50 mg tablet   No No   Sig: Take 1 tablet (50 mg total) by mouth every 6 (six) hours as needed for moderate pain   Patient not taking: Reported on 3/14/2022       Facility-Administered Medications: None       Past Medical History:   Diagnosis Date    Anxiety     Anxiety with depression     Breast cancer [facility])     December 2019 - right sided    [facility] [address] )     Depression     Diabetes mellitus ([facility] [address] [facility]     Hypertension     Port-A-Cath in place     Left    TIA (transient ischemic attack)     only a possible diagnosis    TIA (transient ischemic attack)     "possible"       Past Surgical History:   Procedure Laterality Date    AXILLARY NODE DISSECTION Bilateral     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    AXILLARY NODE DISSECTION      CHOLECYSTECTOMY      FOOT SURGERY      IR PORT PLACEMENT      REMOVAL VENOUS PORT (PORT-A-CATH)  04/06/2022    SIMPLE MASTECTOMY Right     TONSILLECTOMY      TONSILLECTOMY AND ADENOIDECTOMY      WISDOM TOOTH EXTRACTION      WRIST SURGERY Right        Family History   Problem Relation Age of Onset    No Known Problems Mother      I have reviewed and agree with the history as documented      E-Cigarette/Vaping    E-Cigarette Use Former User     Start Date 1[birth year]3/1958     Quit Date 11/4/20      E-Cigarette/Vaping Substances    Nicotine No     THC No     CBD No     Flavoring No     Other No     Unknown No      Social History Tobacco Use    Smoking status: Former Smoker     Packs/day: 0 25     Years: 62 00     Pack years: 15 50     Types: Cigarettes     Start date: 78     Quit date: 10/25/2020     Years since quittin 5    Smokeless tobacco: Never Used    Tobacco comment: stopped smoking 4 weeks ago   Vaping Use    Vaping Use: Former    Start date: 1/3/1958   [place] Quit date: 2020   Substance Use Topics    Alcohol use: Never    Drug use: Never       Review of Systems   Constitutional: Positive for fatigue  Negative for chills and fever  HENT: Negative for congestion, ear pain, rhinorrhea, sinus pressure, sneezing, sore throat and trouble swallowing  Eyes: Negative for discharge and itching  Respiratory: Negative for cough, chest tightness, shortness of breath, wheezing and stridor  Cardiovascular: Negative for chest pain and palpitations  Gastrointestinal: Negative for abdominal pain, diarrhea, nausea and vomiting  Neurological: Positive for syncope and light-headedness  Negative for dizziness, numbness and headaches  All other systems reviewed and are negative  Physical Exam  Physical Exam  Vitals and nursing note reviewed  Constitutional:       General: She is not in acute distress  Appearance: She is well-developed  She is not diaphoretic  HENT:      Head: Normocephalic and atraumatic  Right Ear: External ear normal       Left Ear: External ear normal       Nose: Nose normal    Eyes:      Conjunctiva/sclera: Conjunctivae normal       Pupils: Pupils are equal, round, and reactive to light  Cardiovascular:      Rate and Rhythm: Normal rate and regular rhythm  Heart sounds: Normal heart sounds  No murmur heard  No friction rub  No gallop  Pulmonary:      Effort: Pulmonary effort is normal  No respiratory distress  Breath sounds: Normal breath sounds  No stridor  No wheezing or rales  Abdominal:      General: Bowel sounds are normal  There is no distension  Palpations: Abdomen is soft  Tenderness: There is no abdominal tenderness  There is no guarding  Musculoskeletal:         General: No tenderness  Normal range of motion  Cervical back: Normal range of motion  Skin:     General: Skin is warm  Capillary Refill: Capillary refill takes less than 2 seconds  Neurological:      Mental Status: She is alert and oriented to person, place, and time           Vital Signs  ED Triage Vitals   Temperature Pulse Respirations Blood Pressure SpO2   05/11/22 1346 05/11/22 1346 05/11/22 1346 05/11/22 1346 05/11/22 1346   97 8 °F (36 6 °C) 82 17 153/75 98 %      Temp src Heart Rate Source Patient Position - Orthostatic VS BP Location FiO2 (%)   -- 05/11/22 1430 05/11/22 1346 05/11/22 1346 --    Monitor Sitting Left arm       Pain Score       05/11/22 1346       No Pain           Vitals:    05/11/22 1538 05/11/22 1539 05/11/22 1545 05/11/22 1700   BP: 164/75 104/55 104/55    Pulse: 88 93 81 88   Patient Position - Orthostatic VS: Sitting - Orthostatic VS Standing - Orthostatic VS Standing - Orthostatic VS          Visual Acuity  Visual Acuity    Flowsheet Row Most Recent Value   L Pupil Size (mm) 3   R Pupil Size (mm) 3          ED Medications  Medications   sodium chloride 0 9 % infusion (75 mL/hr Intravenous New Bag 5/11/22 1815)   acetaminophen (TYLENOL) tablet 650 mg (has no administration in time range)   ondansetron (ZOFRAN) injection 4 mg (has no administration in time range)   apixaban (ELIQUIS) tablet 2 5 mg (2 5 mg Oral Given 5/11/22 1812)   atorvastatin (LIPITOR) tablet 80 mg (has no administration in time range)   calcitriol (ROCALTROL) capsule 0 25 mcg (has no administration in time range)   cholecalciferol (VITAMIN D3) tablet 5,000 Units (5,000 Units Oral Not Given 5/11/22 1816)   famotidine (PEPCID) tablet 20 mg (20 mg Oral Given 5/11/22 1812)   PARoxetine (PAXIL) tablet 40 mg (has no administration in time range)   insulin lispro (HumaLOG) 100 units/mL subcutaneous injection 1-5 Units (1 Units Subcutaneous Given 5/11/22 1847)   sodium chloride 0 9 % bolus 1,000 mL (0 mL Intravenous Stopped 5/11/22 1702)       Diagnostic Studies  Results Reviewed     Procedure Component Value Units Date/Time    Fingerstick Glucose (POCT) [[id number]]  (Abnormal) Collected: 05/11/22 1832    Lab Status: Final result Updated: 05/11/22 1833     POC Glucose 174 mg/dl     COVID/FLU/RSV [[id number]]  (Normal) Collected: 05/11/22 1631    Lab Status: Final result Specimen: Nares from Nose Updated: 05/11/22 1758     SARS-CoV-2 Negative     INFLUENZA A PCR Negative     INFLUENZA B PCR Negative     RSV PCR Negative    Narrative:      FOR PEDIATRIC PATIENTS - copy/paste COVID Guidelines URL to browser: [email or web address]  [email or web address]    SARS-CoV-2 assay is a Nucleic Acid Amplification assay intended for the  qualitative detection of nucleic acid from SARS-CoV-2 in nasopharyngeal  swabs  Results are for the presumptive identification of SARS-CoV-2 RNA  Positive results are indicative of infection with SARS-CoV-2, the virus  causing COVID-19, but do not rule out bacterial infection or co-infection  with other viruses  Laboratories within the [place] and its  territories are required to report all positive results to the appropriate  public health authorities  Negative results do not preclude SARS-CoV-2  infection and should not be used as the sole basis for treatment or other  patient management decisions  Negative results must be combined with  clinical observations, patient history, and epidemiological information  This test has not been FDA cleared or approved  This test has been authorized by FDA under an Emergency Use Authorization  (EUA)   This test is only authorized for the duration of time the  declaration that circumstances exist justifying the authorization of the  emergency use of an in vitro diagnostic tests for detection of SARS-CoV-2  virus and/or diagnosis of COVID-19 infection under section 564(b)(1) of  the Act, 21 U  S C  879JFM-1(V)(2), unless the authorization is terminated  or revoked sooner  The test has been validated but independent review by FDA  and CLIA is pending  Test performed using [employer] Gene[employer]pert: This RT-PCR assay targets N2,  a region unique to SARS-CoV-2   A conserved region in the E-gene was chosen  for pan-Sarbecovirus detection which includes SARS-CoV-2     UA (URINE) with reflex to Scope [[id number]]     Lab Status: No result Specimen: Urine     HS Troponin 0hr (reflex protocol) [[id number]]  (Normal) Collected: 05/11/22 1354    Lab Status: Final result Specimen: Blood from Arm, Left Updated: 05/11/22 1432     hs TnI 0hr 7 ng/L     Comprehensive metabolic panel [[id number]]  (Abnormal) Collected: 05/11/22 1354    Lab Status: Final result Specimen: Blood from Arm, Left Updated: 05/11/22 1432     Sodium 134 mmol/L      Potassium 4 0 mmol/L      Chloride 100 mmol/L      CO2 27 mmol/L      ANION GAP 7 mmol/L      BUN 20 mg/dL      Creatinine 1 39 mg/dL      Glucose 171 mg/dL      Calcium 9 5 mg/dL      Corrected Calcium 10 0 mg/dL      AST 7 U/L      ALT <3 U/L      Alkaline Phosphatase 109 U/L      Total Protein 6 7 g/dL      Albumin 3 4 g/dL      Total Bilirubin 0 64 mg/dL      eGFR 36 ml/min/1 73sq m     Narrative:      Raymond guidelines for Chronic Kidney Disease (CKD):     Stage 1 with normal or high GFR (GFR > 90 mL/min/1 73 square meters)    Stage 2 Mild CKD (GFR = 60-89 mL/min/1 73 square meters)    Stage 3A Moderate CKD (GFR = 45-59 mL/min/1 73 square meters)    Stage 3B Moderate CKD (GFR = 30-44 mL/min/1 73 square meters)    Stage 4 Severe CKD (GFR = 15-29 mL/min/1 73 square meters)    Stage 5 End Stage CKD (GFR <15 mL/min/1 73 square meters)  Note: GFR calculation is accurate only with a steady state creatinine    CBC and differential [[id number]]  (Abnormal) Collected: 05/11/22 1354    Lab Status: Final result Specimen: Blood from Arm, Left Updated: 05/11/22 1401     WBC 7 28 Thousand/uL      RBC 3 80 Million/uL      Hemoglobin 10 1 g/dL      Hematocrit 32 6 %      MCV 86 fL      MCH 26 6 pg      MCHC 31 0 g/dL      RDW 16 1 %      MPV 8 5 fL      Platelets 202 Thousands/uL      nRBC 0 /100 WBCs      Neutrophils Relative 65 %      Immat GRANS % 0 %      Lymphocytes Relative 23 %      Monocytes Relative 10 %      Eosinophils Relative 2 %      Basophils Relative 0 %      Neutrophils Absolute 4 76 Thousands/µL      Immature Grans Absolute 0 01 Thousand/uL      Lymphocytes Absolute 1 64 Thousands/µL      Monocytes Absolute 0 73 Thousand/µL      Eosinophils Absolute 0 12 Thousand/µL      Basophils Absolute 0 02 Thousands/µL                  No orders to display              Procedures  Procedures         ED Course  ED Course as of 05/11/22 2057   Wed May 11, 2022   1447 hs TnI 0hr: 7  Negative     1541 Dr Alexi Hankins midodrine PRN 5mg TID PRN  Admit for medication adjustments                                 SBIRT 22yo+    Flowsheet Row Most Recent Value   SBIRT (23 yo +)    In order to provide better care to our patients, we are screening all of our patients for alcohol and drug use  Would it be okay to ask you these screening questions? Yes Filed at: 05/11/2022 1355   Initial Alcohol Screen: US AUDIT-C     1  How often do you have a drink containing alcohol? 0 Filed at: 05/11/2022 1355   2  How many drinks containing alcohol do you have on a typical day you are drinking? 0 Filed at: 05/11/2022 1355   3a  Male UNDER 65: How often do you have five or more drinks on one occasion? 0 Filed at: 05/11/2022 1355   3b  FEMALE Any Age, or MALE 65+: How often do you have 4 or more drinks on one occassion? 0 Filed at: 05/11/2022 1355   Audit-C Score 0 Filed at: 05/11/2022 1355   CRUZ: How many times in the past year have you        Used an illegal drug or used a prescription medication for non-medical reasons? Never Filed at: 05/11/2022 1355                    [facility]  Number of Diagnoses or Management Options  Lightheadedness  Orthostatic hypotension  Syncope  Diagnosis management comments: Symptomatic Orthostatic hypotension  Generally benign physical examination  Case discussed with Dr Dillan Torres of cardiology  Recommends admission for medication management to regulate hypotension  Patient is agreeable to plan  Amount and/or Complexity of Data Reviewed  Clinical lab tests: ordered and reviewed  Tests in the radiology section of CPT®: ordered and reviewed    Risk of Complications, Morbidity, and/or Mortality  Presenting problems: moderate  Diagnostic procedures: low  Management options: low    Patient Progress  Patient progress: stable      Disposition  Final diagnoses:   Lightheadedness   Syncope   Orthostatic hypotension     Time reflects when diagnosis was documented in both MDM as applicable and the Disposition within this note     Time User Action Codes Description Comment    5/11/2022  4:23 PM Harsh Mark [R42] Lightheadedness     5/11/2022  4:23 PM Harsh Mark [R55] Syncope     5/11/2022  4:24 PM Harsh Mark [I95 1] Orthostatic hypotension       ED Disposition     ED Disposition   Admit    Condition   Stable    Date/Time   Wed May 11, 2022  5:13 PM    Comment   Case was discussed with Dr Familia Turner and the patient's admission status was agreed to be observation status to the service of Dr Familia Turner   Follow-up Information    None         Patient's Medications   Discharge Prescriptions    No medications on file       No discharge procedures on file      PDMP Review       Value Time User    PDMP Reviewed  Yes 9/22/2019  9:52 AM Kenyatta Rod, [address] Annelise Breaux          ED Provider  Electronically Signed by           Osmel Gonsalez PA-C  05/11/22 2057

## 2022-05-11 NOTE — ASSESSMENT & PLAN NOTE
Patient daughter reports patient was recently diagnosed with small-cell lung cancer, currently seeking hospice  Has had poor p o   Intake over the past several days, upon getting up she reportedly had a syncopal spell  Daughter reports twitching, but patient awoken briefly after with no post-ictal symptoms  CT head negative  Chest x-ray showing lung mass  In the ED, patient's initial blood pressure was 235 systolic, upon sitting 828, upon standing 100  Will hold further blood pressure medications, atenolol, Norvasc  Cardiology consulted, will defer echo to Cardiology  Monitor on telemetry, initial troponin negative, EKG NSR without ischemic changes, pt denies cp  PT and OT to evaluate  Check orthostatic vitals  Status post 1 L NS, will give gentle fluids on 75 cc/hour

## 2022-05-11 NOTE — Clinical Note
Case was discussed with Dr Kari Thomas and the patient's admission status was agreed to be  to the service of Dr Kari Thomas

## 2022-05-11 NOTE — ASSESSMENT & PLAN NOTE
Lab Results   Component Value Date    EGFR 36 05/11/2022    EGFR 30 02/17/2022    EGFR 26 02/14/2022    CREATININE 1 39 (H) 05/11/2022    CREATININE 1 60 (H) 02/17/2022    CREATININE 1 79 (H) 02/14/2022     Renal functions appear to be at baseline  Will continue monitor at this time, will give gentle IV fluids given orthostatic hypotension

## 2022-05-12 NOTE — CONSULTS
[address]  1945, 76 y o  female MRN: [id number]  Unit/Bed#: ED 25 Encounter: [id number]  Primary Care Provider: Kash Baltazar DO   Date and time admitted to hospital: 5/11/2022  1:44 PM    Inpatient consult to Cardiology  Consult performed by: Mariella Garnett MD  Consult ordered by: Alexx Hameed PA-C          * Syncope  Assessment & Plan  Description is orthostatic  No monitored dysrhythmia  Lung mass  Assessment & Plan  Has elected not to have further treatment  History of breast cancer  Assessment & Plan  Prior treatment  Other summary comments:   Currently feels okay  We will remain available  Further cardiology workup does not seem appropriate here  Outpatient Cardiologist:  None    HPI: Debi Haro is a 76y o  year old female who presented with possible syncope  She has a history of breast cancer which was treated and more recently diagnosed with small cell lung cancer  She has opted to discontinue chemotherapy  In general she has been declining  She has not been eating too much over the past few days  She try to sit up in bed and then seemed to pass out and was twitching  She was brought to the emergency room  Here she has not had any significant monitor dysrhythmia  By description hospice is being sought  She denies chest pain or chest pressure  EKG:   Sinus rhythm  Nonischemic  MOST  RECENT CARDIAC IMAGING:   TTE 11/25/2020:  Normal ejection fraction  Review of Systems: a 10 point review of systems was conducted and is negative except for as mentioned in the HPI or as below          Historical Information   Past Medical History:   Diagnosis Date    Anxiety     Anxiety with depression     Breast cancer [facility])     December 2019 - right sided    Breast cancer ([facility] )     Depression     Diabetes mellitus ([facility] )     Hypertension     Port-A-Cath in place     Left    TIA (transient ischemic attack)     only a possible diagnosis    TIA (transient ischemic attack)     "possible"     Past Surgical History:   Procedure Laterality Date    AXILLARY NODE DISSECTION Bilateral     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    AXILLARY NODE DISSECTION      CHOLECYSTECTOMY      FOOT SURGERY      IR PORT PLACEMENT      REMOVAL VENOUS PORT (PORT-A-CATH)  2022    SIMPLE MASTECTOMY Right     TONSILLECTOMY      TONSILLECTOMY AND ADENOIDECTOMY      WISDOM TOOTH EXTRACTION      WRIST SURGERY Right      Social History     Substance and Sexual Activity   Alcohol Use Never     Social History     Substance and Sexual Activity   Drug Use Never     Social History     Tobacco Use   Smoking Status Former Smoker    Packs/day: 0 25    Years: 62 00    Pack years: 15 50    Types: Cigarettes    Start date: 80    Quit date: 10/25/2020    Years since quittin 5   Smokeless Tobacco Never Used   Tobacco Comment    stopped smoking 4 weeks ago       Family History:   No longer relevant  Meds/Allergies   all current active meds have been reviewed  (Not in a hospital admission)      No Known Allergies    Objective   Vitals: Blood pressure 153/72, pulse 86, temperature 97 8 °F (36 6 °C), resp  rate (!) 24, SpO2 95 %  , There is no height or weight on file to calculate BMI ,   Orthostatic Blood Pressures    Flowsheet Row Most Recent Value   Blood Pressure 153/72 filed at 2022 0845   Patient Position - Orthostatic VS Sitting filed at 2022 4124          Systolic (86QUG), IIS:854 , Min:104 , ETU:978     Diastolic (79QFW), MFF:08, Min:55, Max:105              Physical Exam:    General:  Normal appearance in no distress  Eyes:  Anicteric  Oral mucosa:  Moist   Neck:  No JVD  Carotid upstrokes are brisk without bruits  No masses  Chest:  Decreased breath sounds throughout  Cardiac:  No palpable PMI  Normal S1 and S2  No murmur gallop or rub  Abdomen:  Soft and nontender   No palpable organomegaly or aortic enlargement  Extremities:  No peripheral edema  Musculoskeletal:  Symmetric  Vascular:  Femoral pulses are brisk without bruits  Popliteal  pulses are intact bilaterally  Pedal pulses are intact  Neuro:  Grossly symmetric  Psych:  Alert and oriented x3          Lab Results:     Troponins:    Results from last 7 days   Lab Units 05/11/22  1354   HS TNI 0HR ng/L 7     BNP:   Results from last 6 Months   Lab Units 02/13/22  1311   BNP pg/mL 100       CBC :   Results from last 7 days   Lab Units 05/12/22  0517 05/11/22  1354   WBC Thousand/uL 7 72 7 28   HEMOGLOBIN g/dL 9 8* 10 1*   HEMATOCRIT % 32 1* 32 6*   MCV fL 87 86   PLATELETS Thousands/uL 224 240     TSH:     CMP:   Results from last 7 days   Lab Units 05/12/22  0517 05/11/22  1354   POTASSIUM mmol/L 4 2 4 0   CHLORIDE mmol/L 104 100   CO2 mmol/L 28 27   BUN mg/dL 18 20   CREATININE mg/dL 1 10 1 39*   AST U/L  --  7*   ALT U/L  --  <3*   EGFR ml/min/1 73sq m 47 36     Lipid Profile:     Coags:

## 2022-05-12 NOTE — PLAN OF CARE
Problem: Potential for Falls  Goal: Patient will remain free of falls  Description: INTERVENTIONS:  - Educate patient/family on patient safety including physical limitations  - Instruct patient to call for assistance with activity   - Consult OT/PT to assist with strengthening/mobility   - Keep Call bell within reach  - Keep bed low and locked with side rails adjusted as appropriate  - Keep care items and personal belongings within reach  - Initiate and maintain comfort rounds  - Make Fall Risk Sign visible to staff  - Offer Toileting every 2 Hours, in advance of need  - Initiate/Maintain bed alarm  - Obtain necessary fall risk management equipment: bed alarm  - Apply yellow socks and bracelet for high fall risk patients  - Consider moving patient to room near nurses station  Outcome: Progressing     Problem: MOBILITY - ADULT  Goal: Maintain or return to baseline ADL function  Description: INTERVENTIONS:  -  Assess patient's ability to carry out ADLs; assess patient's baseline for ADL function and identify physical deficits which impact ability to perform ADLs (bathing, care of mouth/teeth, toileting, grooming, dressing, etc )  - Assess/evaluate cause of self-care deficits   - Assess range of motion  - Assess patient's mobility; develop plan if impaired  - Assess patient's need for assistive devices and provide as appropriate  - Encourage maximum independence but intervene and supervise when necessary  - Involve family in performance of ADLs  - Assess for home care needs following discharge   - Consider OT consult to assist with ADL evaluation and planning for discharge  - Provide patient education as appropriate  Outcome: Progressing  Goal: Maintains/Returns to pre admission functional level  Description: INTERVENTIONS:  - Perform BMAT or MOVE assessment daily    - Set and communicate daily mobility goal to care team and patient/family/caregiver     - Collaborate with rehabilitation services on mobility goals if consulted  - Perform Range of Motion 3 times a day  - Reposition patient every 2 hours    - Dangle patient 3 times a day  - Stand patient 3 times a day  - Ambulate patient 3 times a day  - Out of bed to chair 3 times a day   - Out of bed for meals 3 times a day  - Out of bed for toileting  - Record patient progress and toleration of activity level   Outcome: Progressing     Problem: PAIN - ADULT  Goal: Verbalizes/displays adequate comfort level or baseline comfort level  Description: Interventions:  - Encourage patient to monitor pain and request assistance  - Assess pain using appropriate pain scale  - Administer analgesics based on type and severity of pain and evaluate response  - Implement non-pharmacological measures as appropriate and evaluate response  - Consider cultural and social influences on pain and pain management  - Notify physician/advanced practitioner if interventions unsuccessful or patient reports new pain  Outcome: Progressing     Problem: INFECTION - ADULT  Goal: Absence or prevention of progression during hospitalization  Description: INTERVENTIONS:  - Assess and monitor for signs and symptoms of infection  - Monitor lab/diagnostic results  - Monitor all insertion sites, i e  indwelling lines, tubes, and drains  - Monitor endotracheal if appropriate and nasal secretions for changes in amount and color  - [place] appropriate cooling/warming therapies per order  - Administer medications as ordered  - Instruct and encourage patient and family to use good hand hygiene technique  - Identify and instruct in appropriate isolation precautions for identified infection/condition  Outcome: Progressing  Goal: Absence of fever/infection during neutropenic period  Description: INTERVENTIONS:  - Monitor WBC    Outcome: Progressing     Problem: SAFETY ADULT  Goal: Patient will remain free of falls  Description: INTERVENTIONS:  - Educate patient/family on patient safety including physical limitations  - Instruct patient to call for assistance with activity   - Consult OT/PT to assist with strengthening/mobility   - Keep Call bell within reach  - Keep bed low and locked with side rails adjusted as appropriate  - Keep care items and personal belongings within reach  - Initiate and maintain comfort rounds  - Make Fall Risk Sign visible to staff  - Offer Toileting every 2 Hours, in advance of need  - Initiate/Maintain bed alarm  - Obtain necessary fall risk management equipment: bed alarm  - Apply yellow socks and bracelet for high fall risk patients  - Consider moving patient to room near nurses station  Outcome: Progressing  Goal: Maintain or return to baseline ADL function  Description: INTERVENTIONS:  -  Assess patient's ability to carry out ADLs; assess patient's baseline for ADL function and identify physical deficits which impact ability to perform ADLs (bathing, care of mouth/teeth, toileting, grooming, dressing, etc )  - Assess/evaluate cause of self-care deficits   - Assess range of motion  - Assess patient's mobility; develop plan if impaired  - Assess patient's need for assistive devices and provide as appropriate  - Encourage maximum independence but intervene and supervise when necessary  - Involve family in performance of ADLs  - Assess for home care needs following discharge   - Consider OT consult to assist with ADL evaluation and planning for discharge  - Provide patient education as appropriate  Outcome: Progressing  Goal: Maintains/Returns to pre admission functional level  Description: INTERVENTIONS:  - Perform BMAT or MOVE assessment daily    - Set and communicate daily mobility goal to care team and patient/family/caregiver  - Collaborate with rehabilitation services on mobility goals if consulted  - Perform Range of Motion 3 times a day  - Reposition patient every 2 hours    - Dangle patient 3 times a day  - Stand patient 3 times a day  - Ambulate patient 3 times a day  - Out of bed to chair 3 times a day   - Out of bed for meals 3 times a day  - Out of bed for toileting  - Record patient progress and toleration of activity level   Outcome: Progressing     Problem: DISCHARGE PLANNING  Goal: Discharge to home or other facility with appropriate resources  Description: INTERVENTIONS:  - Identify barriers to discharge w/patient and caregiver  - Arrange for needed discharge resources and transportation as appropriate  - Identify discharge learning needs (meds, wound care, etc )  - Arrange for interpretive services to assist at discharge as needed  - Refer to Case Management Department for coordinating discharge planning if the patient needs post-hospital services based on physician/advanced practitioner order or complex needs related to functional status, cognitive ability, or social support system  Outcome: Progressing     Problem: Knowledge Deficit  Goal: Patient/family/caregiver demonstrates understanding of disease process, treatment plan, medications, and discharge instructions  Description: Complete learning assessment and assess knowledge base    Interventions:  - Provide teaching at level of understanding  - Provide teaching via preferred learning methods  Outcome: Progressing

## 2022-05-12 NOTE — PLAN OF CARE
Problem: OCCUPATIONAL THERAPY ADULT  Goal: Performs self-care activities at highest level of function for planned discharge setting  See evaluation for individualized goals  Description: Treatment Interventions: ADL retraining, Functional transfer training, UE strengthening/ROM, Endurance training, Patient/family training, Equipment evaluation/education, Compensatory technique education, Activityengagement  Equipment Recommended: Bedside commode       See flowsheet documentation for full assessment, interventions and recommendations  Note: Limitation: Decreased ADL status, Decreased UE strength, Decreased endurance, Decreased high-level ADLs, Decreased self-care trans  Prognosis: Good  Assessment: Patient is a 76 y o  female seen for OT evaluation s/p admit to [place] [address] on 5/11/2022 w/Syncope  Commorbidities affecting patient's functional performance at time of assessment include: hx of breast cancer, lung mass, CKD and DM2  Orders placed for OT evaluation and treatment and up and OOB as tolerated   Performed at least two patient identifiers during session including name and wristband  Prior to admission, Patient requires assistance with ADLs/IADLs, ambulatory with no AD and lives with daughter in a one level house with 3 KRISTEN  Personal factors affecting patient at time of initial evaluation include: steps to enter, difficulty performing ADLs and difficulty performing IADLs  Upon evaluation, patient requires supervision assist for UB ADLs, minimal  assist for LB ADLs, transfers and functional ambulation in room and bathroom with supervision assist, with the use of no AD  Patient is oriented x 4 and presents with ability to recognize a problem, define a problem, identify alternative plan, select a plan, organize steps in a plan, implement plan and evaluate outcome (problem solving)    Occupational performance is affected by the following deficits: decreased muscle strength, dynamic sit/ stand balance deficit with poor standing tolerance time for self care and functional mobility, decreased activity tolerance and delayed righting and equilibrium reactions  Based on the mentioned OT evaluation outcomes, functional performance deficits, and assessment findings, pt has been identified as a moderate complexity evaluation  Patient to benefit from continued Occupational Therapy treatment while in the hospital to address deficits as defined above and maximize level of functional independence with ADLs and functional mobility  Occupational Performance areas to address include: bathing/ shower, dressing, toilet hygiene, transfer to all surfaces, functional ambulation, medication routine/ management, IADLS: Household maintenance, IADLs: safety procedures and IADLs: meal prep/ clean up  From OT standpoint, recommendation at time of d/c would be Home with home health rehabilitation       OT Discharge Recommendation: Home with home health rehabilitation  OT - OK to Discharge: Yes (Once medically cleared)     Ana Hutchinson OT

## 2022-05-12 NOTE — ED NOTES
Hospitalist contacted via [facility] Text regarding patient's elevated blood pressure readings  Pt denies headache or pain of any kind        Martinez Mora RN  05/12/22 0906

## 2022-05-12 NOTE — PLAN OF CARE
Problem: PHYSICAL THERAPY ADULT  Goal: Performs mobility at highest level of function for planned discharge setting  See evaluation for individualized goals  Description: Treatment/Interventions: Functional transfer training, LE strengthening/ROM, Elevations, Therapeutic exercise, Endurance training, Patient/family training, Equipment eval/education, Bed mobility, Gait training, Spoke to nursing, Spoke to MD  Equipment Recommended:  (pt would benefit from RW use)       See flowsheet documentation for full assessment, interventions and recommendations  5/12/2022 1138 by Earlene Ramos PT  Note: Prognosis: Fair  Problem List: Decreased strength, Decreased endurance, Impaired balance, Decreased mobility, Impaired judgement  Assessment: Pt is 76 y o  female seen for high-complexity PT evaluation on 5/12/2022 s/p admit to [facility] on 5/11/2022 w/ Syncope  PT was consulted to assess pt's functional mobility and d/c needs  Order placed for PT eval and tx, w/ up and OOB as tolerated order  PTA, pt lives alone in 1st floor of home, dtr lives next door  At time of eval, pt requires SUP for all mobility tasks with reports of +dizziness peewee during upright postural activities  Upon evaluation, pt presenting with impaired functional mobility d/t decreased strength, decreased endurance, impaired balance, decreased mobility and impaired judgement  Pertinent PMHx and current co-morbidities affecting pt's physical performance at time of assessment include: lung mass, DM type 2, CKD stage 3, h/o DVT, h/o breast cancer s/p resection and chemotherapy  Personal factors affecting pt at time of eval include: increased age  The following objective measures performed on IE also reveal limitations: AM-PAC 6-Clicks: 98/97  Pt's clinical presentation is currently unstable/unpredictable seen in pt's presentation of +lightheadedness/dizziness with positional changes which worsened in upright standing activities   Overall, pt's rehab potential and prognosis to return to PLOF is fair as impacted by objective findings, warranting pt to receive further skilled PT interventions to address identified impairments, activity limitation(s), and participation restriction(s)  Goal for patient is to feel better and go home  Pt to benefit from continued PT tx to address deficits as defined above and maximize level of functional independent mobility and consistency in order for pt to improve safety with OOB mobility to reduce fall risk  From PT/mobility standpoint, recommendation at time of d/c would be home with home health rehabilitation pending progress in order to facilitate return to PLOF  Barriers to Discharge: None        PT Discharge Recommendation: Home with home health rehabilitation          See flowsheet documentation for full assessment

## 2022-05-12 NOTE — ASSESSMENT & PLAN NOTE
· Lab Results   Component Value Date    EGFR 47 05/12/2022    EGFR 36 05/11/2022    EGFR 30 02/17/2022    CREATININE 1 10 05/12/2022    CREATININE 1 39 (H) 05/11/2022    CREATININE 1 60 (H) 02/17/2022     · Renal functions appear to be at baseline  · Continue IV fluid

## 2022-05-12 NOTE — ED NOTES
Pt states she fells sob; vital obtained; lung sounds diminshed; mild work of breathing noted; provider made aware; no new orders at this time     Reena Lopez, MANOJ  05/12/22 0102

## 2022-05-12 NOTE — CASE MANAGEMENT
Case Management Assessment & Discharge Planning Note    Patient name Salvador Rose  Location ED 25/ED 25 MRN [id number]  : 1945 Date 2022       Current Admission Date: 2022  Current Admission Diagnosis:Syncope   Patient Active Problem List    Diagnosis Date Noted    Lung mass 2022    Syncope 2022    Fracture of humeral shaft, right, closed 2022    GIULIANA (acute kidney injury) ([facility] [facility] ) 2020    History of DVT (deep vein thrombosis) 2020    Type 2 diabetes mellitus with stage 2 chronic kidney disease and hypertension ([facility] ) 2020    Stroke-like symptoms 2020    Pulmonary nodule 2020    History of breast cancer 2020    Hypertensive urgency 2020    Hypoxia 2019    Bronchospasm     Acute respiratory failure with hypoxia ([facility] ) 2019    DVT (deep venous thrombosis) ([place] [address] ) 2019    Tobacco abuse 2019    Stage 3 chronic kidney disease ([facility] ) 2019    Type 2 diabetes mellitus without complication, without long-term current use of insulin ([facility] ) 2019      LOS (days): 0  Geometric Mean LOS (GMLOS) (days): 2 30  Days to GMLOS:2 1     OBJECTIVE:    Risk of Unplanned Readmission Score: 15 19         Current admission status: Inpatient  Referral Reason: Other (d/c planning)    Preferred Pharmacy:   [facility]/pharmacy #[id number] [address]  Phone: [phone] Fax: [phone]    [id number]  [address] [address]  Phone: [phone] Fax: [phone]    Primary Care Provider: Tom Lerma DO    Primary Insurance: [facility] REP  Secondary Insurance:     ASSESSMENT:  [address], [address] Representative - Child   Primary Phone: [phone] (Mobile)                         Readmission Root Cause  30 Day Readmission: No    Patient Information  Admitted from[de-identified] Home  Mental Status: Alert  During Assessment patient was accompanied by: Not accompanied during assessment  Assessment information provided by[de-identified] Patient, Daughter  Primary Caregiver: Family  Caregiver's Name[de-identified] Cornelia Calderon Relationship to Patient[de-identified] Family Member (daughter)  Caregiver's Telephone Number[de-identified] [phone]  Support Systems: Daughter  [place] of Residence: [address] do you live in?: [address] access options   Select all that apply : Stairs  Number of steps to enter home : 2  Do the steps have railings?: Yes  Type of Current Residence: [place]  In the last 12 months, was there a time when you were not able to pay the mortgage or rent on time?: No  In the last 12 months, how many places have you lived?: 1  In the last 12 months, was there a time when you did not have a steady place to sleep or slept in a shelter (including now)?: No  Homeless/housing insecurity resource given?: N/A  Living Arrangements: Lives w/ Daughter (pt has an in-law suite  there is a door that connects the living area)  Is patient a ?: No    Activities of Daily Living Prior to Admission  Functional Status: Assistance (driving, cooking, laundry, cleaning, shopping, meds)  Completes ADLs independently?: No  Level of ADL dependence: Assistance (assistswiht bathing and dressing)  Ambulates independently?: No  Level of ambulatory dependence: Assistance  Does patient use assisted devices?: Yes  Assisted Devices (DME) used: Kenn Dickey  Does patient currently own DME?: Yes  What DME does the patient currently own?: Bedside Commode, Wheelchair, Shower Chair, Home Oxygen concentrator (glucometer  pt has oxygen at bedtime [facility])  Does patient have a history of Outpatient Therapy (PT/OT)?: No  Does the patient have a history of Short-Term Rehab?: No  Does patient have a history of HHC?: Yes  Does patient currently have [address]?: Yes (mariela)    [facility]  Type of Current Home Care Services: Home PT, Nurse visit, Home OT  [address][de-identified] [facility] Provider[de-identified] PCP    Patient Information Continued  Income Source: Pension/skilled nursing  Does patient have prescription coverage?: Yes ([facility])  Within the past 12 months, you worried that your food would run out before you got the money to buy more : Never true  Within the past 12 months, the food you bought just didn't last and you didn't have money to get more : Never true  Food insecurity resource given?: N/A  Does patient receive dialysis treatments?: No  Does patient have a history of substance abuse?: No  Does patient have a history of Mental Health Diagnosis?: Yes (depression)  Is patient receiving treatment for mental health?: Yes (medication from her pcp)  Has patient received inpatient treatment related to mental health in the last 2 years?: No         Means of Transportation  Means of Transport to Appts[de-identified] Family transport  In the past 12 months, has lack of transportation kept you from medical appointments or from getting medications?: No  In the past 12 months, has lack of transportation kept you from meetings, work, or from getting things needed for daily living?: No  Was application for public transport provided?: N/A        DISCHARGE DETAILS:    Discharge planning discussed with[de-identified] patient and daughter was claled at 12:34 pm LM & 15:35 met wiochoa daughter at the bedside  Freedom of Choice: Yes  Comments - Freedom of Choice: recommendation is [facility]    daughter has spoken with latasha warren,  she would like [facility] at present until the pt has her appointment with her cancer doctor and then she may agree to hospice care  CM contacted family/caregiver?: Yes             Contacts  Patient Contacts: Ricardo Chau  Relationship to Patient[de-identified] Family (daughter)  Contact Method:  In Person  Reason/Outcome: Discharge [address]         Is the patient interested in [facility] at discharge?: Yes  [address] requested[de-identified] [address], Nursing, Occupational Therapy, Physical [address] Name[de-identified] [address] Services Needed[de-identified] Strengthening/Theraputic Exercises to Improve Function, Oxygen Via Nasal Cannula, Diabetes Management, Other (comment) (vs, review medications)  Oxygen LPM Ordered (if applicable based on home health services needed):: 2 LPM  Homebound Criteria Met[de-identified] Uses an Assist Device (i e  cane, walker, etc), Requires the Assistance of Another Person for Safe Ambulation or to Leave the Home  Supporting Clincal Findings[de-identified] Limited Endurance, Requires Oxygen         Other Referral/Resources/Interventions Provided:  Interventions: HHC, DME  Ref[facility] Comments[facility] daughter states she is active with them, she is open to hopsice care but she wants the pt to see her "cancer doctor" before she agrees, she has spoken carline Grider from [id number] and she stated she has her phone #, she stated she will call her when she is ready, pt is on 2 liters of oxygen continuous, cm placed a call to [facility] and she has a rx on cristi for hs only, she will need a dsat study and new rx for portabliltiy if she needs continuous    Would you like to participate in our [address] service program?  : No - Declined    Treatment Team Recommendation: Home with [address] [employer] (home wiht daughter & hhc- family will transport)

## 2022-05-12 NOTE — ASSESSMENT & PLAN NOTE
· Due to orthostatic hypotension   · Patient daughter reports patient was recently diagnosed with small-cell lung cancer, currently seeking hospice  · Has had poor p o  Intake over the past several days, upon getting up she reportedly had a syncopal spell  · Daughter reports twitching, but patient awoken briefly after with no post-ictal symptoms  · CT head negative  · Chest x-ray showing lung mass  · This monitored, patient's initial blood pressure was 598Y systolic, upon sitting 985C  · At home patient is on Norvasc 10 mg daily and atenolol 100 mg daily  · Patient was started on Norvasc 5 mg due to BPs being in the 200s over night, continue to hold other home BP meds due to symptomatic orthostatic hypotension  · Cardiology consulted - suggested orthostatic hypotension, no further cardiac workup recommended  · Troponin negative, EKG showed no acute ST changes   · Received 1 L IV fluid bolus in ED  Initially on 75 cc/hour   Will increase the fluid to 100 cc/hour  · PT and OT recommended home therapy  · Check orthostatic vitals every shift  · IV labetalol for SBP >180

## 2022-05-12 NOTE — ED NOTES
Pt c/o anxiety  Provider made aware   Pt medicated as ordered     Donna Miller, MANOJ  05/11/22 3222

## 2022-05-12 NOTE — ED NOTES
Pt stated to this RN that her depend feels wet despite having the purewick in place  Depend noted to be soiled  Changed for same  Bed linens changed also as they were soiled  Pt cleansed for same  PT presented to patient bedside to perform ordered evaluation  They assumed care of this patient mid linen change and completed same  Pt placed back in bed into position of comfort   PT reported to this RN that patient verbalized c/o dizziness upon standing then she was placed back into bed by PT staff x2       Leida Cohne RN  05/12/22 9093

## 2022-05-12 NOTE — ED NOTES
Admission hospitalist contacted regarding results of orthostatic blood pressures  New orders received and noted        Martinez Mora RN  05/12/22 7738

## 2022-05-12 NOTE — ASSESSMENT & PLAN NOTE
· Patient reports history of breast cancer status post resection and chemotherapy  · Currently in surveillance, follows Oncology at [facility]

## 2022-05-12 NOTE — UTILIZATION REVIEW
OBSERVATION 5/11/22 @ [address] 5/12/22 @1305 DUE TO ORTHOSTATIC HYPOTENSION  Initial Clinical Review    Admission: Date/Time/Statement:     05/12/22 1306  Inpatient Admission  Once        Transfer Service: Hospitalist       Question Answer Comment   Level of Care Med Surg    Estimated length of stay More than 2 Midnights    Certification I certify that inpatient services are medically necessary for this patient for a duration of greater than two midnights  See H&P and MD Progress Notes for additional information about the patient's course of treatment  05/12/22 1305       ED Arrival Information     Expected   -    Arrival   5/11/2022 13:43    Acuity   Urgent            Means of arrival   Ambulance    Escorted by   [facility]    Service   Hospitalist    Admission type   Urgent            Arrival complaint   dizziness           Chief Complaint   Patient presents with    Dizziness     Patient reports dizziness  Patient was seen a few weeks ago for a previous issue  Patient daughter reports she stood her up to go to the bathroom and had a "seizure"  Patient reports she was conscious during this seizure  Initial Presentation: 76 y o  female to the ED from home via E[facility] with complaints of dizziness, lightheaded, worse with standing for a long period of time  Admitted under observation then converted to inpatient for syncope, lung mass  Has known lung ca and has elected not to treat at this time  Has had poor po intake recently  IS seeking hospice care  In the ED, orthostatic  Hold atenolol, norvasc  Cardiology consult  Check ECHO  Monitor tele  Initial troponin neg  S/P 1LIter IV fluids, continue  Date:    5/12 Dizziness during pt/ot eval with orthostatic hypotension  Cardiology consult:  Description of syncope is orthostatic  No dysrhythmia noted  Has opted to discontinue chemo         ED Triage Vitals   Temperature Pulse Respirations Blood Pressure SpO2   05/11/22 1346 05/11/22 1346 05/11/22 1346 05/11/22 1346 05/11/22 1346   97 8 °F (36 6 °C) 82 17 153/75 98 %      Temp src Heart Rate Source Patient Position - Orthostatic VS BP Location FiO2 (%)   -- 05/11/22 1430 05/11/22 1346 05/11/22 1346 --    Monitor Sitting Left arm       Pain Score       05/11/22 1346       No Pain          Wt Readings from Last 1 Encounters:   02/21/22 74 4 kg (164 lb)     Additional Vital Signs:   /Time Temp Pulse Resp BP MAP (mmHg) SpO2 Calculated FIO2 (%) - Nasal Cannula Nasal Cannula O2 Flow Rate (L/min) O2 Device Patient Position - Orthostatic VS   05/12/22 1232 -- 98 22 104/55 74 96 % 28 2 L/min Nasal cannula Standing   05/12/22 1229 -- 94 22 149/70 100 96 % 28 2 L/min Nasal cannula Sitting   05/12/22 1227 -- 87 22 173/84 Abnormal  121 97 % 28 2 L/min Nasal cannula Lying       /Time Temp Pulse Resp BP MAP (mmHg) SpO2 Calculated FIO2 (%) - Nasal Cannula Nasal Cannula O2 Flow Rate (L/min) O2 Device Patient Position - Orthostatic VS   05/12/22 0800 -- 96 24 Abnormal  223/105 Abnormal  150 97 % 28 2 L/min Nasal cannula Lying   05/12/22 0600 -- 91 22 212/92 Abnormal  132 97 % 28 2 L/min Nasal cannula Lying   05/12/22 0515 -- 93 22 194/89 Abnormal  126 97 % 28 2 L/min Nasal cannula Lying   05/12/22 0004 -- 101 24 Abnormal  198/95 Abnormal  136 97 % 28 2 L/min -- --   05/11/22 1700 -- 88 -- -- -- 87 % Abnormal   -- -- None (Room air) --   SpO2: PATIENT not tolerating room air   usually wears 2 L only at night at 05/11/22 1700   05/11/22 1545 -- 81 -- 104/55 73 96 % -- -- -- Standing - Orthostatic VS   05/11/22 1539 -- 93 17 104/55 -- -- -- -- -- Standing - Orthostatic VS   05/11/22 1538 -- 88 18 164/75 -- -- -- -- -- Sitting - Orthostatic VS   05/11/22 1537 -- 84 18 193/90 Abnormal  -- -- -- -- -- Lying - Orthostatic VS   05/11/22 1430 -- 81 -- 187/89 Abnormal  128 97 % -- -- -- --   05/11/22 1346 97 8 °F (36 6 °C) 82 17 153/75 -- 98 % 28 2 L/min Nasal cannula Sitting     Pertinent Labs/Diagnostic Test Results:   5/11 EKG: Normal sinus rhythm  Normal ECG  When compared with ECG of 13-FEB-2022 16:09,  No significant change was found    Results from last 7 days   Lab Units 05/11/22  1631   SARS-COV-2  Negative     Results from last 7 days   Lab Units 05/12/22  0517 05/11/22  1354   WBC Thousand/uL 7 72 7 28   HEMOGLOBIN g/dL 9 8* 10 1*   HEMATOCRIT % 32 1* 32 6*   PLATELETS Thousands/uL 224 240   NEUTROS ABS Thousands/µL 5 39 4 76         Results from last 7 days   Lab Units 05/12/22  0517 05/11/22  1354   SODIUM mmol/L 138 134*   POTASSIUM mmol/L 4 2 4 0   CHLORIDE mmol/L 104 100   CO2 mmol/L 28 27   ANION GAP mmol/L 6 7   BUN mg/dL 18 20   CREATININE mg/dL 1 10 1 39*   EGFR ml/min/1 73sq m 47 36   CALCIUM mg/dL 9 1 9 5     Results from last 7 days   Lab Units 05/11/22  1354   AST U/L 7*   ALT U/L <3*   ALK PHOS U/L 109*   TOTAL PROTEIN g/dL 6 7   ALBUMIN g/dL 3 4*   TOTAL BILIRUBIN mg/dL 0 64     Results from last 7 days   Lab Units 05/11/22  1832   POC GLUCOSE mg/dl 174*     Results from last 7 days   Lab Units 05/12/22  0517 05/11/22  1354   GLUCOSE RANDOM mg/dL 149* 171*         Results from last 7 days   Lab Units 05/11/22  1354   HS TNI 0HR ng/L 7     Results from last 7 days   Lab Units 05/11/22  2328   CLARITY UA  Clear   COLOR UA  Straw   SPEC GRAV UA  1 015   PH UA  6 0   GLUCOSE UA mg/dl Trace*   KETONES UA mg/dl Negative   BLOOD UA  Negative   PROTEIN UA mg/dl Negative   NITRITE UA  Negative   BILIRUBIN UA  Negative   UROBILINOGEN UA E U /dl 0 2   LEUKOCYTES UA  Negative     Results from last 7 days   Lab Units 05/11/22  1631   INFLUENZA A PCR  Negative   INFLUENZA B PCR  Negative   RSV PCR  Negative     ED Treatment:   Medication Administration from 05/11/2022 1343 to 05/12/2022 0650       Date/Time Order Dose Route Action     05/11/2022 1602 sodium chloride 0 9 % bolus 1,000 mL 1,000 mL Intravenous New Bag     05/12/2022 0831 sodium chloride 0 9 % infusion 100 mL/hr Intravenous Rate/Dose Change 05/11/2022 1815 sodium chloride 0 9 % infusion 75 mL/hr Intravenous New Bag     05/11/2022 1812 apixaban (ELIQUIS) tablet 2 5 mg 2 5 mg Oral Given     05/11/2022 1812 famotidine (PEPCID) tablet 20 mg 20 mg Oral Given     05/11/2022 1847 insulin lispro (HumaLOG) 100 units/mL subcutaneous injection 1-5 Units 1 Units Subcutaneous Given     05/11/2022 2354 LORazepam (ATIVAN) tablet 0 5 mg 0 5 mg Oral Given     05/12/2022 0508 amLODIPine (NORVASC) tablet 5 mg 5 mg Oral Given        Past Medical History:   Diagnosis Date    Anxiety     Anxiety with depression     [facility] cancer [facility])     December 2019 - right sided    [facility] )     Depression     Diabetes mellitus ([facility] )     Hypertension     Port-A-Cath in place     Left    TIA (transient ischemic attack)     only a possible diagnosis    TIA (transient ischemic attack)     "possible"       Admitting Diagnosis: Dizziness [R42]  Age/Sex: 76 y o  female  Admission Orders:  Dental soft diet  Thin liquids  Orthostatics  Tele  Up and OOB  SCDs    Scheduled Medications:  amLODIPine, 5 mg, Oral, Daily  apixaban, 2 5 mg, Oral, BID  atorvastatin, 80 mg, Oral, Daily  calcitriol, 0 25 mcg, Oral, Daily  cholecalciferol, 5,000 Units, Oral, Once per day on Mon Wed Fri  famotidine, 20 mg, Oral, BID  insulin lispro, 1-5 Units, Subcutaneous, TID AC  PARoxetine, 40 mg, Oral, Daily      Continuous IV Infusions:  sodium chloride, 100 mL/hr, Intravenous, Continuous      PRN Meds:  acetaminophen, 650 mg, Oral, Q6H PRN  labetalol, 10 mg, Intravenous, Q6H PRN  ondansetron, 4 mg, Intravenous, Q6H PRN        IP CONSULT TO CARDIOLOGY  IP CONSULT TO CASE MANAGEMENT    Network Utilization Review Department  ATTENTION: Please call with any questions or concerns to [phone] and carefully listen to the prompts so that you are directed to the right person   All voicemails are confidential   Darius Arenas all requests for admission clinical reviews, approved or denied determinations and any other requests to dedicated fax number below belonging to the campus where the patient is receiving treatment   List of dedicated fax numbers for the Facilities:  [address] [place] [address] DENIALS (Administrative/Medical Necessity) [phone]   [address]  [facility] (Maternity/NICU/Pediatrics) [phone]   [address]  [address] Medical [place] [address] [address] [place] [address] [address] [facility] [place] [address] [phone]

## 2022-05-12 NOTE — ASSESSMENT & PLAN NOTE
Lab Results   Component Value Date    HGBA1C 7 7 (H) 12/18/2020     · Hold home p o   Diabetic medication  · Hb A1c 7 7, sliding scale and Accu-Cheks

## 2022-05-12 NOTE — DISCHARGE INSTRUCTIONS
Discharge instructions from hospitalist  1  Follow-up with your primary care physician in 1 week in regards to recent hospitalization  2  Take medications regularly    Changes in medications    Do not take atenolol 100 mg daily  Take amlodipine 5 mg daily for now  Check blood pressure at home and discuss with PCP for further adjustment  3  Come back to the ER if symptoms recur or worsen  4  Activity as tolerated  5   Diet :  Heart healthy diet; information packet has more detailed information

## 2022-05-12 NOTE — PHYSICAL THERAPY NOTE
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 and hypertension ([facility] )    Stroke-like symptoms    Pulmonary nodule    History of breast cancer    Hypertensive urgency    History of DVT (deep vein thrombosis)    [facility]ULIANA (acute kidney [facility]) [facility] )    Acute respiratory failure with hypoxia (HCC)    DVT (deep venous thrombosis) (HCC)    Tobacco abuse    Stage 3 chronic kidney disease ([facility] )    Type 2 diabetes mellitus without complication, without long-term current use of insulin (HCC)    Bronchospasm    Hypoxia    Syncope    Fracture of humeral shaft, right, closed    Lung mass       Past Medical History  Past Medical History:   Diagnosis Date    Anxiety     Anxiety with depression     Breast cancer 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    AXILLARY NODE DISSECTION      CHOLECYSTECTOMY      FOOT SURGERY      IR PORT PLACEMENT      REMOVAL VENOUS PORT (PORT-A-CATH)  04/06/2022    SIMPLE MASTECTOMY Right     TONSILLECTOMY      TONSILLECTOMY AND ADENOIDECTOMY      WISDOM TOOTH EXTRACTION      WRIST SURGERY Right        PT performed at least 2 patient identifiers during session: Name and wristband         05/12/22 0906   PT Last Visit   PT Visit Date 05/12/22   Note Type   Note type Evaluation   Pain Assessment   Pain Assessment Tool 0-10   Pain Score No Pain   Restrictions/Precautions   Weight Bearing Precautions Per Order No   Other Precautions Multiple lines;Telemetry;O2;Fall Risk  (2 L O2 via NC- used at baseline)   Home Living   Type of [address] One level;Performs ADLs on one level; Able to live on main level with bedroom/bathroom;Stairs to enter with rails  (3 KRISTEN)   Bathroom Shower/Tub Walk-in shower   Bathroom Toilet Standard   Bathroom Equipment Shower chair;Grab bars around toilet;Grab bars in [place]; Wheelchair-manual;Life alert  (no AD used at baseline)   Prior Function   Level of [place] Needs assistance with ADLs and functional mobility; Needs assistance with IADLs   Lives With Daughter  ("mother/daughter homes")   Receives Help From Family   ADL Assistance Needs assistance   IADLs Needs assistance   Falls in the last 6 months 5 to 10  ("about 4-5 falls")   Vocational Retired   General   Family/Caregiver Present No   Cognition   Overall Cognitive Status WFL   Arousal/Participation Alert   Attention Within functional limits   Orientation Level Oriented X4   Memory Within functional limits   Following Commands Follows one step commands without difficulty   Comments pt agreeable to PT session, pleasant   Subjective   Subjective "I feel dizzy"   RLE Assessment   RLE Assessment   (grossly 4-/5 MMT)   LLE Assessment   LLE Assessment   (grossly 4-/5 MMT)   Vision-Basic Assessment   Current Vision Wears glasses all the time   Coordination   Movements are Fluid and Coordinated 1   Bed Mobility   Supine to Sit   (DNT: pt seated on commode upon arrival)   Sit to Supine 5  Supervision   Additional items Assist x 1;HOB elevated; Increased time required   Transfers   Sit to Stand 5  Supervision   Additional items Assist x 1; Increased time required;Verbal cues   Stand to Sit 5  Supervision   Additional items Assist x 1;Verbal cues   Toilet transfer 5  Supervision   Additional items Assist x 1;Commode;Verbal cues;Armrests   Additional Comments Pt reported (+) dizziness with transitional movements  Verbal cues provided for proper body mechanics  RN reports having just taken orthostatic BPs in supine and sitting positions with + findings for orthostatic hypotension   Ambulation/Elevation   Gait pattern Improper Weight shift;Decreased foot clearance;Shuffling   Gait Assistance 5  Supervision   Additional items Assist x 1;Verbal cues   Assistive Device None   Distance 4 ft from [facility] to EOB   Stair Management Assistance Not tested   Balance   Static Sitting Good   Dynamic Sitting Fair +   Static Standing Fair   Dynamic Standing Fair -   Ambulatory Fair -   Endurance Deficit   Endurance Deficit Yes   Activity Tolerance   Activity Tolerance Patient limited by fatigue;Treatment limited secondary to medical complications (Comment)  (Dizziness)   Medical Staff Made Aware Pt seen as a co-eval with OT due to the patient's co-morbidities, clinically unstable presentation, and present impairments which are a regression from the patient's baseline  Nurse Made Aware MANOJ Mcdonald made aware of outcomes   Assessment   Prognosis Fair   Problem List Decreased strength;Decreased endurance; Impaired balance;Decreased mobility; Impaired judgement   Assessment Pt is 76 y o  female seen for high-complexity PT evaluation on 5/12/2022 [facility]  on 5/11/2022 w/ Syncope  PT was consulted to assess pt's functional mobility and d/c needs  Order placed for PT eval and tx, w/ up and OOB as tolerated order  PTA, pt lives alone in 1st floor of home, dtr lives next door  At time of eval, pt requires SUP for all mobility tasks with reports of +dizziness peewee during upright postural activities  Upon evaluation, pt presenting with impaired functional mobility d/t decreased strength, decreased endurance, impaired balance, decreased mobility and impaired judgement   Pertinent PMHx and current co-morbidities affecting pt's physical performance at time of assessment include: lung mass, DM type 2, CKD stage 3, h/o DVT, h/o breast cancer s/p resection and chemotherapy  Personal factors affecting pt at time of eval include: increased age  The following objective measures performed on IE also reveal limitations: AM-PAC 6-Clicks: 13/02  Pt's clinical presentation is currently unstable/unpredictable seen in pt's presentation of +lightheadedness/dizziness with positional changes which worsened in upright standing activities  Overall, pt's rehab potential and prognosis to return to PLOF is fair as impacted by objective findings, warranting pt to receive further skilled PT interventions to address identified impairments, activity limitation(s), and participation restriction(s)  Goal for patient is to feel better and go home  Pt to benefit from continued PT tx to address deficits as defined above and maximize level of functional independent mobility and consistency in order for pt to improve safety with OOB mobility to reduce fall risk  From PT/mobility standpoint, recommendation at time of d/c would be home with home health rehabilitation pending progress in order to facilitate return to PLOF     Barriers to Discharge None   Goals   Patient Goals "to get better and go home"   STG Expiration Date 05/22/22   Short Term Goal #1 In 7-10 days: Increase bilateral LE strength 1/2 grade to facilitate independent mobility, Perform all bed mobility tasks modified independent to decrease caregiver burden, Perform all transfers modified independent to improve independence, Ambulate > 75 ft  with least restrictive assistive device modified independent w/o LOB and w/ normalized gait pattern 100% of the time, Navigate 3 stairs with close S with unilateral handrail to facilitate return to previous living environment, Increase all balance 1/2 grade to decrease risk for falls, Complete exercise program independently and Tolerate 4 hr OOB to faciliate upright tolerance   PT Treatment Day 0   Plan Treatment/Interventions Functional transfer training;LE strengthening/ROM; Elevations; Therapeutic exercise; Endurance training;Patient/family training;Equipment eval/education; Bed mobility;Gait training;Spoke to nursing;Spoke to MD   PT Frequency 3-5x/wk   Recommendation   PT Discharge Recommendation Home with home health rehabilitation   Equipment Recommended   (pt would benefit from RW use)   Additional Comments Pt's raw score on the AM-[facility] Basic Mobility inpatient short form is 18, standardized score is 41 05  Patients at this level are likely to benefit from DC to home with home care, however, please refer to therapist recommendation for safe DC planning  AM-[facility] Basic Mobility Inpatient   Turning in Bed Without Bedrails 4   Lying on Back to Sitting on Edge of Flat Bed 4   Moving Bed to Chair 3   Standing Up From Chair 3   Walk in Room 2   Climb 3-5 Stairs 2   Basic Mobility Inpatient Raw Score 18   Basic Mobility Standardized Score 41 05   Highest Level Of Mobility   JH-HLM Goal 6: Walk 10 steps or more   JH-HLM Highest Level of Mobility 5: Stand (1 or more minutes)   JH-HLM Goal Achieved No   End of Consult   Patient Position at End of Consult Supine; All needs within reach       Trever Elliott, PT, DPT

## 2022-05-12 NOTE — PROGRESS NOTES
[address]  Progress Note Katie Kumar 1945, 76 y o  female MRN: [id number]  Unit/Bed#: ED 25 Encounter: [id number]  Primary Care Provider: Harry Peters DO   Date and time admitted to hospital: 5/11/2022  1:44 PM    * Syncope  Assessment & Plan  · Due to orthostatic hypotension   · Patient daughter reports patient was recently diagnosed with small-cell lung cancer, currently seeking hospice  · Has had poor p o  Intake over the past several days, upon getting up she reportedly had a syncopal spell  · Daughter reports twitching, but patient awoken briefly after with no post-ictal symptoms  · CT head negative  · Chest x-ray showing lung mass  · This monitored, patient's initial blood pressure was 720X systolic, upon sitting 082D  · At home patient is on Norvasc 10 mg daily and atenolol 100 mg daily  · Patient was started on Norvasc 5 mg due to BPs being in the 200s over night, continue to hold other home BP meds due to symptomatic orthostatic hypotension  · Cardiology consulted - suggested orthostatic hypotension, no further cardiac workup recommended  · Troponin negative, EKG showed no acute ST changes   · Received 1 L IV fluid bolus in ED  Initially on 75 cc/hour   Will increase the fluid to 100 cc/hour  · PT and OT recommended home therapy  · Check orthostatic vitals every shift  · IV labetalol for SBP >180      Lung mass  Assessment & Plan  · Chest x-ray on admission shows left paramediastinal mass, left upper lobe mass and left pleural effusion  · Patient follows Oncology at [facility]  · Unable to review documentation taking care everywhere  · Per daughter, reports diagnosis of small-cell lung cancer, recently had bed port removed declining further chemotherapy  · Daughter is currently in talks with [address]  · Case mgmt consulted for further review    Type 2 diabetes mellitus without complication, without long-term current use of insulin [facility])  Assessment & Plan  Lab Results   Component Value Date    HGBA1C 7 7 (H) 2020     · Hold home p o  Diabetic medication  · Hb A1c 7 7, sliding scale and Accu-Cheks    Stage 3 chronic kidney disease (HCC)  Assessment & Plan  · Lab Results   Component Value Date    EGFR 47 2022    EGFR 36 2022    EGFR 30 2022    CREATININE 1 10 2022    CREATININE 1 39 (H) 2022    CREATININE 1 60 (H) 2022     · Renal functions appear to be at baseline  · Continue IV fluid    DVT (deep venous thrombosis) ([facility] [address] )  Assessment & Plan  · History of DVT, on Eliquis 2 5 b i d , will continue    History of breast cancer  Assessment & Plan  · Patient reports history of breast cancer status post resection and chemotherapy  · Currently in surveillance, follows Oncology at [facility]      VTE Pharmacologic Prophylaxis:   Pharmacologic: Apixaban (Eliquis)  Mechanical VTE Prophylaxis in Place: Yes    Patient Centered Rounds: I have performed bedside rounds with nursing staff today  Discussions with Specialists or Other Care Team Provider:  Discussed with Case Management, nurse, PT and OT    Education and Discussions with Family / Patient:  Discussed with patient, and will discuss with daughter    Time Spent for Care: 45 minutes  More than 50% of total time spent on counseling and coordination of care as described above  Current Length of Stay: 0 day(s)    Current Patient Status: Inpatient   Certification Statement: The patient will continue to require additional inpatient hospital stay due to IV fluid    Discharge Plan / Estimated Discharge Date:  Possibly tomorrow      Code Status: Level 3 - DNAR and DNI      Subjective:   Patient was seen and examined at bedside  The patient denies any light headache dizziness during my encounter however patient had slight dizziness during PT and OT with significant orthostatic hypotension        Objective:     Vitals:   Temp (24hrs), Av 8 °F (36 6 °C), Min:97 8 °F (36 6 °C), Max:97 8 °F (36 6 °C)    Temp:  [97 8 °F (36 6 °C)] 97 8 °F (36 6 °C)  HR:  [] 86  Resp:  [17-24] 24  BP: (104-223)/() 153/72  SpO2:  [87 %-99 %] 95 %  There is no height or weight on file to calculate BMI  Input and Output Summary (last 24 hours): Intake/Output Summary (Last 24 hours) at 5/12/2022 1107  Last data filed at 5/12/2022 0844  Gross per 24 hour   Intake --   Output 1000 ml   Net -1000 ml       Physical Exam:     Physical Exam  General: breathing well on room air, no acute distress  HEENT: NC/AT, PERRL, EOM - normal  Neck: Supple  Pulm/Chest: Normal chest wall expansion, clear breath sounds on both side, no wheezing/rhonchi or crackles appreciated  CVS: RRR, normal S1&S2, no murmur appreciated, capillary refill <2s  Abd: soft, non tender, non distended, bowel sounds +  MSK: move all 4 extremities spontaneously  Skin: warm  CNS: no acute focal neuro deficit      Additional Data:     Labs:    Results from last 7 days   Lab Units 05/12/22  0517   WBC Thousand/uL 7 72   HEMOGLOBIN g/dL 9 8*   HEMATOCRIT % 32 1*   PLATELETS Thousands/uL 224   NEUTROS PCT % 69   LYMPHS PCT % 19   MONOS PCT % 11   EOS PCT % 1     Results from last 7 days   Lab Units 05/12/22  0517 05/11/22  1354   POTASSIUM mmol/L 4 2 4 0   CHLORIDE mmol/L 104 100   CO2 mmol/L 28 27   BUN mg/dL 18 20   CREATININE mg/dL 1 10 1 39*   CALCIUM mg/dL 9 1 9 5   ALK PHOS U/L  --  109*   ALT U/L  --  <3*   AST U/L  --  7*           * I Have Reviewed All Lab Data Listed Above  * Additional Pertinent Lab Tests Reviewed:  [address] Admission Reviewed    Imaging:    Imaging Reports Reviewed Today Include:  Recent CT brain without contrast, chest x-ray  Imaging Personally Reviewed by Myself Includes:  None      Recent Cultures (last 7 days):           Last 24 Hours Medication List:   Current Facility-Administered Medications   Medication Dose Route Frequency Provider Last Rate    acetaminophen  650 mg Oral Q6H PRN Edvin Teresa Devan Almeida MD      amLODIPine  5 mg Oral Daily [place], [place]      apixaban  2 5 mg Oral BID Renata Schroeder MD      atorvastatin  80 mg Oral Daily MD Wilfrido Galindo calcitriol  0 25 mcg Oral Daily Renata Schroeder MD      cholecalciferol  5,000 Units Oral Once per day on Mon Wed Fri Renata Schroeder MD      famotidine  20 mg Oral BID Renata Schroeder MD      insulin lispro  1-5 Units Subcutaneous TID [facility] Renata Schroeder MD      labetalol  10 mg Intravenous Q6H PRN Ed Salinas MD      ondansetron  4 mg Intravenous Q6H PRN Renata Schroeder MD      PARoxetine  40 mg Oral Daily Renata Schroeder MD      sodium chloride  100 mL/hr Intravenous Continuous Ed Salinas  mL/hr (05/12/22 0831)        Today, Patient Was Seen By: Ed Salinas MD    ** Please Note: Dragon 360 Dictation voice to text software may have been used in the creation of this document   **

## 2022-05-12 NOTE — ASSESSMENT & PLAN NOTE
· Chest x-ray on admission shows left paramediastinal mass, left upper lobe mass and left pleural effusion  · Patient follows Oncology at [facility]  · Unable to review documentation taking care everywhere  · Per daughter, reports diagnosis of small-cell lung cancer, recently had bed port removed declining further chemotherapy  · Daughter is currently in talks with [address]  · Case mgmt consulted for further review

## 2022-05-12 NOTE — OCCUPATIONAL THERAPY NOTE
Occupational Therapy Evaluation      [facility]    5/12/2022    Patient Active Problem List   Diagnosis    Type 2 diabetes mellitus with stage 2 chronic kidney disease and hypertension (HCC)    Stroke-like symptoms    Pulmonary nodule    History of breast cancer    Hypertensive urgency    History of DVT (deep vein thrombosis)    GIULIANA (acute kidney injury) ([facility] [address] )    Acute respiratory failure with hypoxia (HCC)    DVT (deep venous thrombosis) (HCC)    Tobacco abuse    Stage 3 chronic kidney disease ([facility] [address] )    Type 2 diabetes mellitus without complication, without long-term current use of insulin (HCC)    Bronchospasm    Hypoxia    Syncope    Fracture of humeral shaft, right, closed    Lung mass       Past Medical History:   Diagnosis Date    Anxiety     Anxiety with depression     Breast cancer [facility])     December 2019 - right sided    Breast cancer ([facility] [address] )     Depression     Diabetes mellitus ([facility] )     Hypertension     Port-A-Cath in place     Left    TIA (transient ischemic attack)     only a possible diagnosis    TIA (transient ischemic attack)     "possible"       Past Surgical History:   Procedure Laterality Date    AXILLARY NODE DISSECTION Bilateral     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    AXILLARY NODE DISSECTION      CHOLECYSTECTOMY      FOOT SURGERY      IR PORT PLACEMENT      REMOVAL VENOUS PORT (PORT-A-CATH)  04/06/2022    SIMPLE MASTECTOMY Right     TONSILLECTOMY      TONSILLECTOMY AND ADENOIDECTOMY      WISDOM TOOTH EXTRACTION      WRIST SURGERY Right         05/12/22 0857   OT Last Visit   OT Visit Date 05/12/22   Note Type   Note type Evaluation   Additional Comments Pt agreeable to OT eval  Upon arrival pt seated on commode  Restrictions/Precautions   Weight Bearing Precautions Per Order No   Other Precautions Multiple lines; Fall Risk;O2  (2 L O2 via NC- used at baseline)   Pain Assessment   Pain Assessment Tool 0-10   Pain Score No Pain   Home Living   Type of [address] [place] [address] level;Able to live on main level with bedroom/bathroom; Performs ADLs on one level;Stairs to enter with rails  (3 KRISTEN)   Bathroom Shower/Tub Walk-in shower   Bathroom Toilet Standard   Bathroom Equipment Shower chair;Grab bars in shower;Grab bars around toilet   [facility]; Wheelchair-manual;Life alert  (no AD used at baseline)   Prior Function   Level of [place] Needs assistance with ADLs and functional mobility; Needs assistance with IADLs   Lives With Daughter   Receives Help From Family   ADL Assistance Needs assistance   IADLs Needs assistance   Falls in the last 6 months   ("about 4-5 falls")   Vocational Retired   Comments (-) driving   Lifestyle   Autonomy Patient requires assistance with ADLs/IADLs, ambulatory with no AD and lives with daughter in a one level house with 3 KRISTEN   Reciprocal Relationships Daughter assists c "everything" and is home majority of the time   Service to Others Retired   ADL   Eating Assistance 6  Modified independent   Grooming Assistance 6  [address] [place] [address]  4  [address] 5  [facility]  4  Minimal Assistance   Additional Comments Pt limited by decreased balance, strength and (+) dizziness  Bed Mobility   Supine to Sit   (DNT: pt seated on commode upon arrival)   Sit to Supine 5  Supervision   Additional items Assist x 1;HOB elevated; Increased time required   Transfers   Sit to Stand 5  Supervision   Additional items Assist x 1; Increased time required;Verbal cues   Stand to Sit 5  Supervision   Additional items Assist x 1;Bedrails;Verbal cues; Increased time required   Toilet transfer 5  Supervision   Additional items Assist x 1; Armrests; Increased time required;Verbal cues; Commode   Additional Comments Pt reported (+) dizziness with transitional movements  Verbal cues provided for proper body mechanics  Functional Mobility   Functional Mobility   (CGA)   Additional Comments Pt ambulated short distance commode>bed with no overt LOB or SOB  Pt limited by dizziness  Additional items   (Pt ambulated with no AD)   Balance   Static Sitting Good   Dynamic Sitting Fair +   Static Standing Fair   Dynamic Standing Fair -   Activity Tolerance   Activity Tolerance Patient limited by fatigue;Treatment limited secondary to medical complications (Comment)  (Dizziness)   Medical Staff Made Aware Pt seen as a co-eval with PT due to the patient's co-morbidities, clinically unstable presentation, and present impairments which are a regression from the patient's baseline  Nurse Made Aware RN made aware of outcomes   RUE Assessment   RUE Assessment WFL  (grossly 4-/5 MMT)   LUE Assessment   LUE Assessment WFL  (grossly 4-/5 MMT)   Hand Function   Gross Motor Coordination Functional   Fine Motor Coordination Functional   Sensation   Light Touch No apparent deficits   Vision-Basic Assessment   Current Vision Wears glasses all the time   Cognition   Overall Cognitive Status [facility]   Arousal/Participation Alert; Responsive; Cooperative   Attention Within functional limits   Orientation Level Oriented X4   Memory Within functional limits   Following Commands Follows one step commands without difficulty   Assessment   Limitation Decreased ADL status; Decreased UE strength;Decreased endurance;Decreased high-level ADLs; Decreased self-care trans   Prognosis Good   Assessment Patient is a 76 y o  female seen for OT evaluation s/p admit to [facility] on 5/11/2022 w/Syncope  Commorbidities affecting patient's functional performance at time of assessment include: hx of breast cancer, lung mass, CKD and DM2  Orders placed for OT evaluation and treatment and up and OOB as tolerated   Performed at least two patient identifiers during session including name and wristband    Prior to admission, Patient requires assistance with ADLs/IADLs, ambulatory with no AD and lives with daughter in a one level house with 3 KRISTEN  Personal factors affecting patient at time of initial evaluation include: steps to enter, difficulty performing ADLs and difficulty performing IADLs  Upon evaluation, patient requires supervision assist for UB ADLs, minimal  assist for LB ADLs, transfers and functional ambulation in room and bathroom with supervision assist, with the use of no AD  Patient is oriented x 4 and presents with ability to recognize a problem, define a problem, identify alternative plan, select a plan, organize steps in a plan, implement plan and evaluate outcome (problem solving)  Occupational performance is affected by the following deficits: decreased muscle strength, dynamic sit/ stand balance deficit with poor standing tolerance time for self care and functional mobility, decreased activity tolerance and delayed righting and equilibrium reactions  Based on the mentioned OT evaluation outcomes, functional performance deficits, and assessment findings, pt has been identified as a moderate complexity evaluation  Patient to benefit from continued Occupational Therapy treatment while in the hospital to address deficits as defined above and maximize level of functional independence with ADLs and functional mobility  Occupational Performance areas to address include: bathing/ shower, dressing, toilet hygiene, transfer to all surfaces, functional ambulation, medication routine/ management, IADLS: Household maintenance, IADLs: safety procedures and IADLs: meal prep/ clean up  From OT standpoint, recommendation at time of d/c would be Home with home health rehabilitation  Goals   Patient Goals to get better and go home   Plan   Treatment Interventions ADL retraining;Functional transfer training;UE strengthening/ROM; Endurance training;Patient/family training;Equipment evaluation/education; Compensatory technique education; Activityengagement   Goal Expiration Date 05/22/22   OT Treatment Day 0   OT Frequency 3-5x/wk   Recommendation   OT Discharge Recommendation Home with home health rehabilitation   Equipment Recommended Bedside commode   Commode Type Standard   OT - OK to Discharge Yes  (Once medically cleared)   Additional Comments  At end of eval, pt supine in bed with HOB elevated   Additional Comments 2 The patient's raw score on the -PAC Daily Activity inpatient short form is 20, standardized score is 42 03, greater than 39 4  Patients at this level are likely to benefit from discharge to home  Please refer to the recommendation of the Occupational Therapist for safe discharge planning     AM-PAC Daily Activity Inpatient   Lower Body Dressing 3   Bathing 3   Toileting 3   Upper Body Dressing 3   Grooming 4   Eating 4   Daily Activity Raw Score 20   Daily Activity Standardized Score (Calc for Raw Score >=11) 42 03   AM-[facility] Applied Cognition Inpatient   Following a Speech/Presentation 4   Understanding Ordinary Conversation 4   Taking Medications 3   Remembering Where Things Are Placed or Put Away 3   Remembering List of 4-5 Errands 3   Taking Care of Complicated Tasks 3   Applied Cognition Raw Score 20   Applied Cognition Standardized Score 41 76     GOALS:    *ADL transfers with (I) for inc'd independence with ADLs/purposeful tasks    *UB ADL with (I) for inc'd independence with self cares    *LB ADL with (I) using AE prn for inc'd independence with self cares    *Toileting with (I) for clothing management and hygiene for return to PLOF with personal care    *Increase stand tolerance x 5  m for inc'd tolerance with standing purposeful tasks    *Participate in 10m UE therex to increase overall stamina/activity tolerance for purposeful tasks    *Bed mobility- (I) for inc'd independence to manage own comfort and initiate EOB & OOB purposeful tasks    *Patient will verbalize 3 safety awareness/ principles to prevent falls in the home setting  *Patient will verbalize and demonstrate use of energy conservation/deep breathing techniques and work simplification skills during functional activities with no verbal cues  *Patient will increase OOB/sitting tolerance to 2-4 hours per day to increase participation in self-care and leisure tasks with no s/s of exertion       MARIBEL Christian, OTR/L

## 2022-05-13 PROBLEM — S42.309A HUMERAL FRACTURE: Status: ACTIVE | Noted: 2022-01-01

## 2022-05-13 NOTE — CODE DOCUMENTATION
Pt found on floor by RN staff, bed alarm going off  Pt states she was going to the bathroom  C/o L shoulder pain and noted to have skin tear to left hand  No LOC, no other complaints   Pt examined by MARYAN and placed in [facility] to go to XRAY/CT scan

## 2022-05-13 NOTE — ASSESSMENT & PLAN NOTE
Lab Results   Component Value Date    EGFR 47 05/12/2022    EGFR 36 05/11/2022    EGFR 30 02/17/2022    CREATININE 1 10 05/12/2022    CREATININE 1 39 (H) 05/11/2022    CREATININE 1 60 (H) 02/17/2022     · Renal functions appear to be at baseline  · Continue IV fluid

## 2022-05-13 NOTE — RAPID RESPONSE
Rapid Response Note  Regino Dixon 76 y o  female MRN: [id number]  Unit/Bed#: -01 Encounter: [id number]    Rapid Response Notification(s):   Response called date/time:  5/12/2022 11:06 PM  Response team arrival date/time:  5/12/2022 11:07 PM  Response end date/time:  5/12/2022 11:15 PM  Level of care:  Medsur  [facility]  Primary reason for rapid response call: Fall    Rapid Response Intervention(s):   Airway:  None  Breathing:  None  Circulation:  None  Fluids administered:  None  Medications administered:  None  Comments:  Sling applied to left arm         Assessment:   · Mechanical FAll    Plan:   · CT brain, left shoulder and humerus xray  · Sling     Rapid Response Outcome:   Transfer:  Remain on floor  Primary service notified of transfer: Yes Princess Kayla OBANDO)    Code Status: Level 3 (DNAR and DNI)      Family notified of transfer: no  Family member contacted: NA     Background/Situation:   Regino Dixon is a 76 y o  female with past medical history of breast cancer with new dx of lung ca, HTN, DM, DVT who was admitted yesterday for syncope  RRT called tonight for mechanical fall  Patient was found on left side on floor by RN  Patient reports she got out of bed to go the bathroom and tripped falling onto left side on the floor  States she remembers falling but unsure of LOC  Complained of left humeral pain and decreased ROM in left arm  Review of Systems   Constitutional: Negative for chills and fatigue  Eyes: Negative for visual disturbance  Respiratory: Negative for chest tightness and shortness of breath  Cardiovascular: Negative for chest pain, palpitations and leg swelling  Gastrointestinal: Negative for abdominal distention, diarrhea, nausea and vomiting  Endocrine: Negative  Genitourinary: Negative  Musculoskeletal: Positive for myalgias  Neurological: Positive for dizziness  All other systems reviewed and are negative        Objective: Vitals:    05/12/22 2312 05/12/22 2315 05/12/22 2318 05/12/22 2344   BP:    (!) 190/98   BP Location:       Pulse: 82 81 64 83   Resp:    21   Temp:       TempSrc:       SpO2: 90% 98% 100% 99%   Weight:       Height:         Physical Exam  Constitutional:       General: She is not in acute distress  HENT:      Head: Normocephalic and atraumatic  Mouth/Throat:      Mouth: Mucous membranes are moist    Eyes:      Extraocular Movements: Extraocular movements intact  Conjunctiva/sclera: Conjunctivae normal       Pupils: Pupils are equal, round, and reactive to light  Cardiovascular:      Rate and Rhythm: Normal rate and regular rhythm  Pulses: Normal pulses  Heart sounds: No murmur heard  Pulmonary:      Effort: Pulmonary effort is normal  No respiratory distress  Breath sounds: Normal breath sounds  Abdominal:      General: Bowel sounds are normal  There is no distension  Palpations: Abdomen is soft  Tenderness: There is no abdominal tenderness  Musculoskeletal:         General: Tenderness present  Left upper arm: Tenderness present  No deformity  Cervical back: Normal range of motion  Comments: Decreased ROM of left shoulder and elbow 2/2 pain   Skin:     General: Skin is warm and dry  Comments: Skin tear noted to left hand   Neurological:      Mental Status: Mental status is at baseline  GCS: GCS eye subscore is 4  GCS verbal subscore is 5  GCS motor subscore is 6  Cranial Nerves: Cranial nerves are intact  Sensory: Sensation is intact  Motor: Motor function is intact  Psychiatric:         Mood and Affect: Mood normal          Speech: Speech normal          Behavior: Behavior normal  Behavior is cooperative  Thought Content: Thought content normal          Cognition and Memory: Cognition normal          Portions of the record may have been created with voice recognition software    Occasional wrong word or "sound a like" substitutions may have occurred due to the inherent limitations of voice recognition software  Read the chart carefully and recognize, using context, where substitutions have occurred      [id number] Yoanna Bean

## 2022-05-13 NOTE — PLAN OF CARE
Problem: PHYSICAL THERAPY ADULT  Goal: Performs mobility at highest level of function for planned discharge setting  See evaluation for individualized goals  Description: Treatment/Interventions: Functional transfer training, LE strengthening/ROM, Elevations, Therapeutic exercise, Endurance training, Patient/family training, Equipment eval/education, Bed mobility, Gait training, Spoke to nursing, Spoke to MD  Equipment Recommended:  (pt would benefit from RW use)       See flowsheet documentation for full assessment, interventions and recommendations  Outcome: Progressing  Note: Prognosis: Fair  Problem List: Decreased strength, Decreased endurance, Impaired balance, Decreased mobility, Impaired judgement  Assessment: Pt seen for PT treatment session this date, consisting of ther act focused on bed mobility tasks with donning of appropriately sized shoulder sling  Since previous session, pt has made no progress in terms of functional activity tolerance  Pertinent barriers during this session include pain  Current goals and POC remain appropriate, pt continues to have rehab potential and is making progress towards STGs  Pt prognosis for achieving goals is fair, pending pt progress with hospitalization/medical status improvements, and indicated by eye contact  Pt limited d/t medical instability, lack of self-control (impulsivity) and limited insight into impairments, decreased cognitive status  PT recommends post acute rehabilitation services upon discharge  Pt continues to be functioning below baseline level, and remains limited 2* factors listed above  PT will continue to see pt during current hospitalization in order to address the deficits listed above and provide interventions consistent w/ POC in effort to achieve STGs  Barriers to Discharge: None        PT Discharge Recommendation: Post acute rehabilitation services (updated recommendation)          See flowsheet documentation for full assessment

## 2022-05-13 NOTE — QUICK NOTE
Rapid response called, Critical care ran RR  CT head reviewed with no acute findings    Will place on 1:1   Appears to have humeral neck fracture on left my read   Patient is in sling  Will consult ortho

## 2022-05-13 NOTE — CONSULTS
Consultation - Christian Wu Berto 76 y o  female MRN: [id number]  Unit/Bed#: -01 Encounter: [id number]      Assessment/Plan     Assessment:  Nondisplaced left humeral head and humeral neck fractures  Plan:  X-rays of the patient's left shoulder are consistent with nondisplaced fractures of her left humeral head and left humeral neck  The fractures are in acceptable alignment  This is a stable injury that this not require surgical intervention  The patient should continue the sling with a waist strap for more secure immobilization  She show follow up with our office in 2 weeks with new x-rays  The patient and the patient's daughter or acceptable to this plan  History of Present Illness   Physician Requesting Consult: Ivan Wolff MD  Reason for Consult / Principal Problem:  Left shoulder pain  HPI: Dene Hashimoto is a 76y o  year old female who was admitted secondary to multiple medical conditions  The patient has a history of orthostatic hypotension with subsequent syncopal episodes  Apparently last night, the patient tried to get out of bed to go to the bathroom when she had a fall  She had immediate pain in her left shoulder  X-rays were performed which were consistent with nondisplaced left humeral head and humeral neck fractures  She was placed in a sling  She does complain of left shoulder pain today  The patient's is a poor historian and is sometimes unreliable  She denies any numbness or tingling  She denies any fever or chills  Inpatient consult to Orthopedic Surgery  Consult performed by: Mulugeta Barbosa PA-C  Consult ordered by: Andie Alfaro PA-C          Review of Systems   Constitutional: Negative for chills, fever and unexpected weight change  HENT: Negative for nosebleeds and sore throat  Eyes: Negative for pain, redness and visual disturbance  Respiratory: Negative for cough, shortness of breath and wheezing      Cardiovascular: Negative for chest pain, palpitations and leg swelling  Gastrointestinal: Negative for abdominal pain, nausea and vomiting  Endocrine: Negative for polydipsia and polyuria  Genitourinary: Negative for dysuria and hematuria  Musculoskeletal: Positive for arthralgias and myalgias  As noted in HPI   Skin: Negative for rash and wound  Neurological: Positive for syncope and light-headedness  Negative for numbness and headaches  Psychiatric/Behavioral: Negative for decreased concentration and suicidal ideas  The patient is not nervous/anxious          Historical Information   Past Medical History:   Diagnosis Date    Anxiety     Anxiety with depression     Breast cancer [facility])     December 2019 - right sided    Breast cancer ([facility] )     Depression     Diabetes mellitus ([facility] )     Hypertension     Port-A-Cath in place     Left    TIA (transient ischemic attack)     only a possible diagnosis    TIA (transient ischemic attack)     "possible"     Past Surgical History:   Procedure Laterality Date    AXILLARY NODE DISSECTION Bilateral     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    AXILLARY NODE DISSECTION      CHOLECYSTECTOMY      FOOT SURGERY      IR PORT PLACEMENT      REMOVAL VENOUS PORT (PORT-A-CATH)  04/06/2022    SIMPLE MASTECTOMY Right     TONSILLECTOMY      TONSILLECTOMY AND ADENOIDECTOMY      WISDOM TOOTH EXTRACTION      WRIST SURGERY Right      Social History   Social History     Substance and Sexual Activity   Alcohol Use Never     Social History     Substance and Sexual Activity   Drug Use Never     E-Cigarette/Vaping    E-Cigarette Use Former User     Start Date 1/3/1958     Quit Date 11/4/20      E-Cigarette/Vaping Substances    Nicotine No     THC No     CBD No     Flavoring No     Other No     Unknown No      Social History     Tobacco Use   Smoking Status Former Smoker    Packs/day: 0 25    Years: 62 00    Pack years: 15 50    Types: Cigarettes    Start date: 1958    Quit date: 10/25/2020    Years since quittin 5   Smokeless Tobacco Never Used   Tobacco Comment    stopped smoking 4 weeks ago     Family History: non-contributory    Meds/Allergies   all current active meds have been reviewed  No Known Allergies    Objective   Vitals: Blood pressure 128/78, pulse 94, temperature 98 1 °F (36 7 °C), resp  rate 18, height 5' 3" (1 6 m), weight 77 kg (169 lb 12 1 oz), SpO2 (!) 88 %  ,Body mass index is 30 07 kg/m²  Intake/Output Summary (Last 24 hours) at 2022 1011  Last data filed at 2022 1350  Gross per 24 hour   Intake 650 ml   Output --   Net 650 ml     I/O last 24 hours: In: 650 [I V :650]  Out: 1000 [Urine:1000]    Invasive Devices  Report    Peripheral Intravenous Line  Duration           Peripheral IV 22 Dorsal (posterior); Right Forearm <1 day                Physical Exam  Ortho Exam  Left upper extremity is neurovascularly intact  Fingers are pink mobile  Compartments are soft  Sling in place without waist strap  Ecchymosis along the entire left upper extremity  Range of motion limited to pain  Brisk cap refill  Sensation intact  Physical Exam   Constitutional: Appears well-developed and well-nourished  No distress  HENT:   Head: Normocephalic  Eyes: Conjunctivae are normal  Right eye exhibits no discharge  Left eye exhibits no discharge  No scleral icterus  Cardiovascular: Normal rate  Pulmonary/Chest: Effort normal    Neurological: Alert and oriented to person and place  Skin: Skin is warm and dry  No rash noted  The patient is not diaphoretic  No erythema  No pallor  Psychiatric: Normal mood and affect  Behavior is normal  Judgment and thought content normal    Lab Results: I have personally reviewed pertinent lab results  Imaging Studies: I have personally reviewed pertinent reports  EKG, Pathology, and Other Studies: I have personally reviewed pertinent reports      VTE Prophylaxis: Fondaparinux (Arixtra)    Code Status: Level 3 - DNAR and DNI  Advance Directive and Living Will:      Power of :    POLST:      Counseling / Coordination of Care  Total floor / unit time spent today 30 minutes  Greater than 50% of total time was spent with the patient and / or family counseling and / or coordination of care

## 2022-05-13 NOTE — ASSESSMENT & PLAN NOTE
· Patient reports history of breast cancer status post resection and chemotherapy  · Currently in surveillance, follows Oncology at [facility]

## 2022-05-13 NOTE — ASSESSMENT & PLAN NOTE
· Chest x-ray on admission shows left paramediastinal mass, left upper lobe mass and left pleural effusion  · Patient follows Oncology at [facility]  · Unable to review documentation taking care everywhere  · Per daughter, reports diagnosis of small-cell lung cancer, recently had bed port removed declining further chemotherapy  · Daughter said she does not want to pursue hospice until she sees the oncologist  · Case mgmt consulted for further review

## 2022-05-13 NOTE — ASSESSMENT & PLAN NOTE
Lab Results   Component Value Date    HGBA1C 7 6 (H) 05/12/2022     · Hold home p o   Diabetic medication  · Hb A1c 7 7, sliding scale and Accu-Cheks

## 2022-05-13 NOTE — ASSESSMENT & PLAN NOTE
· Patient had a fall at the night of 05/12/2022, with nondisplaced fracture  · Orthopedic consult - recommended conservative management  · PT and OT recommended rehab

## 2022-05-13 NOTE — PHYSICAL THERAPY NOTE
Physical Therapy Treatment Note       05/13/22 1309   PT Last Visit   PT Visit Date 05/13/22   Note Type   Note Type Treatment   Pain Assessment   Pain Assessment Tool 0-10   Pain Score 8   Pain Location/Orientation Orientation: Left; Location: Shoulder   Pain Onset/Description Onset: Ongoing;Frequency: Constant/Continuous   Patient's Stated Pain Goal No pain   Hospital Pain Intervention(s) Repositioned; Emotional support;Rest;Splinting   Restrictions/Precautions   Weight Bearing Precautions Per Order Yes   LUE Weight Bearing Per Order NWB   Braces or Orthoses Sling  (PT donned size M SHLDER IMMOB  W/FOAM STRAPS,M)   Other Precautions O2;Fall Risk;Telemetry; Bed Alarm; Chair Alarm  (3 L O2 via NC)   General   Chart Reviewed Yes   Response to Previous Treatment Patient unable to report, no changes reported from family or staff   Family/Caregiver Present No   Cognition   Arousal/Participation Alert; Responsive; Cooperative   Attention Attends with cues to redirect   Orientation Level Oriented to person;Disoriented to place;Oriented to time;Oriented to situation   Memory Decreased recall of precautions;Decreased recall of biographical information   Following Commands Follows one step commands without difficulty   Comments pt agreeable to PT session   Subjective   Subjective "I have alot of pain in my shoulder"   Bed Mobility   Rolling R 5  Supervision   Additional items Assist x 1; Increased time required   Rolling L 5  Supervision   Additional items Assist x 1; Increased time required  (full rolling was not performed d/t shoulder pain)   Supine to Sit Unable to assess  (PT deferred d/t hypotension reports via RN)   Additional Comments Last night at 11:07 rapid response called as pt with mechanical fall  Pt was found laying on floor on L side by RN  X-rays of L shoulder are consistent with nondisplaced fractures of L humeral head and L humeral neck   Per orthopedics - pt was recommend to be nonsurgical, continue immobilization with sling + waist strap  Transfers   Sit to Stand   (DNT d/t safety concerns)   Endurance Deficit   Endurance Deficit Yes   Activity Tolerance   Activity Tolerance Patient limited by fatigue;Patient limited by pain;Treatment limited secondary to medical complications (Comment)  (hypotension)   Medical Staff Made Aware CM [address]   Nurse Made Aware RN made aware of outcomes   Assessment   Prognosis Fair   Problem List Decreased strength;Decreased endurance; Impaired balance;Decreased mobility; Impaired judgement   Assessment Pt seen for PT treatment session this date, consisting of ther act focused on bed mobility tasks with donning of appropriately sized shoulder sling  Since previous session, pt has made no progress in terms of functional activity tolerance  Pertinent barriers during this session include pain  Current goals and POC remain appropriate, pt continues to have rehab potential and is making progress towards STGs  Pt prognosis for achieving goals is fair, pending pt progress with hospitalization/medical status improvements, and indicated by eye contact  Pt limited d/t medical instability, lack of self-control (impulsivity) and limited insight into impairments, decreased cognitive status  PT recommends post acute rehabilitation services upon discharge  Pt continues to be functioning below baseline level, and remains limited 2* factors listed above  PT will continue to see pt during current hospitalization in order to address the deficits listed above and provide interventions consistent w/ POC in effort to achieve STGs  Barriers to Discharge None   Goals   Patient Goals "to have less pain"   STG Expiration Date 05/22/22   Short Term Goal #1 goals remain appropriate   PT Treatment Day 1   Plan   Treatment/Interventions Functional transfer training;LE strengthening/ROM; Therapeutic exercise; Endurance training;Cognitive reorientation;Patient/family training;Equipment eval/education; Bed mobility;Gait training;Spoke to nursing;Spoke to case management   Progress Slow progress, decreased activity tolerance   PT Frequency 3-5x/wk   Recommendation   PT Discharge Recommendation Post acute rehabilitation services  (updated recommendation)   Equipment Recommended   (TBD)   Additional Comments Pt's raw score on the AM-PAC Basic Mobility inpatient short form is 12, standardized score is 32 23  Patients at this level are likely to benefit from DC to [address], however, please refer to therapist recommendation for safe DC planning  AM-PAC Basic Mobility Inpatient   Turning in Bed Without Bedrails 3   Lying on Back to Sitting on Edge of Flat Bed 2   Moving Bed to Chair 2   Standing Up From Chair 2   Walk in Room 2   Climb 3-5 Stairs 1   Basic Mobility Inpatient Raw Score 12   Basic Mobility Standardized Score 32 23   Highest Level Of Mobility   JH-HLM Goal 4: Move to chair/commode   JH-HLM Achieved 2: Bed activities/Dependent transfer   Education   Education Provided Mobility training  (shoulder sling to LUE)   Patient Reinforcement needed   End of Consult   Patient Position at End of Consult Supine;Bed/Chair alarm activated; All needs within reach       [facility], PT    Time of PT treatment session: 9475-4353 (total treatment time = 25 minutes)

## 2022-05-13 NOTE — PROGRESS NOTES
[facility] 43  Progress Note Brandon Escobar 1945, 76 y o  female MRN: [id number]  Unit/Bed#: -01 Encounter: [id number]  Primary Care Provider: Alisson Encinas DO   Date and time admitted to hospital: 5/11/2022  1:44 PM    * Syncope  Assessment & Plan  · Due to orthostatic hypotension   · Patient daughter reports patient was recently diagnosed with small-cell lung cancer, currently seeking hospice  · Has had poor p o   Intake over the past several days, upon getting up she reportedly had a syncopal spell  · Daughter reports twitching, but patient awoken briefly after with no post-ictal symptoms  · CT head negative  · Chest x-ray showing lung mass  · In ED, patient's initial blood pressure was 508N systolic, upon sitting 982Y  · According to the daughter, patient is only on carvedilol at home  · On 05/12/2022 Patient was started on Norvasc 5 mg due to BPs being in the 200s over night, continue to hold home BP meds due to significant orthostatic hypotension  · Cardiology consulted - suggested orthostatic hypotension, no further cardiac workup recommended  · Troponin negative, EKG showed no acute ST changes   · Continue IV fluid 100 cc/hour  · PT and OT recommended rehab  · Check orthostatic vitals every shift  · IV labetalol for SBP >180  · Nephrology consulted for blood pressure medication management      Humeral fracture  Assessment & Plan  · Patient had a fall at the night of 05/12/2022, with nondisplaced fracture  · Orthopedic consult - recommended conservative management  · PT and OT recommended rehab    Lung mass  Assessment & Plan  · Chest x-ray on admission shows left paramediastinal mass, left upper lobe mass and left pleural effusion  · Patient follows Oncology at [facility]  · Unable to review documentation taking care everywhere  · Per daughter, reports diagnosis of small-cell lung cancer, recently had bed port removed declining further chemotherapy  · Daughter said she does not want to pursue hospice until she sees the oncologist  · Case mgmt consulted for further review    Type 2 diabetes mellitus without complication, without long-term current use of insulin [facility])  Assessment & Plan  Lab Results   Component Value Date    HGBA1C 7 6 (H) 05/12/2022     · Hold home p o  Diabetic medication  · Hb A1c 7 7, sliding scale and Accu-Cheks    Stage 3 chronic kidney disease [facility])  Assessment & Plan  Lab Results   Component Value Date    EGFR 47 05/12/2022    EGFR 36 05/11/2022    EGFR 30 02/17/2022    CREATININE 1 10 05/12/2022    CREATININE 1 39 (H) 05/11/2022    CREATININE 1 60 (H) 02/17/2022     · Renal functions appear to be at baseline  · Continue IV fluid    DVT (deep venous thrombosis) ([facility] )  Assessment & Plan  · History of DVT, on Eliquis 2 5 b i d , will continue    History of breast cancer  Assessment & Plan  · Patient reports history of breast cancer status post resection and chemotherapy  · Currently in surveillance, follows Oncology at [facility]    VTE Pharmacologic Prophylaxis:   Pharmacologic: Apixaban (Eliquis)  Mechanical VTE Prophylaxis in Place: Yes    Patient Centered Rounds: I have performed bedside rounds with nursing staff today  Discussions with Specialists or Other Care Team Provider:  Discussed with Case Management, nurse    Education and Discussions with Family / Patient:  Discussed with patient, daughter    Time Spent for Care: 45 minutes  More than 50% of total time spent on counseling and coordination of care as described above  Current Length of Stay: 1 day(s)    Current Patient Status: Inpatient   Certification Statement: The patient will continue to require additional inpatient hospital stay due to Orthostatic hypotension requiring medication adjustment, pending placement to rehab    Discharge Plan / Estimated Discharge Date:  Pending      Code Status: Level 3 - DNAR and DNI      Subjective:   Patient was seen and examined at bedside   The patient denies any pain, headache, blurry vision, chest pain, palpitation, shortness of breath, N/V, abd pain  Objective:     Vitals:   Temp (24hrs), Av °F (36 7 °C), Min:97 2 °F (36 2 °C), Max:98 7 °F (37 1 °C)    Temp:  [97 2 °F (36 2 °C)-98 7 °F (37 1 °C)] 98 1 °F (36 7 °C)  HR:  [] 82  Resp:  [18-21] 18  BP: ()/() 89/53  SpO2:  [87 %-100 %] 98 %  Body mass index is 30 07 kg/m²  Input and Output Summary (last 24 hours):     No intake or output data in the 24 hours ending 22 1527    Physical Exam:     Physical Exam  General: breathing well on room air, no acute distress  HEENT: NC/AT, PERRL, EOM - normal  Neck: Supple  Pulm/Chest: Normal chest wall expansion, clear breath sounds on both side, no wheezing/rhonchi or crackles appreciated  CVS: RRR, normal S1&S2, no murmur appreciated, capillary refill <2s  Abd: soft, non tender, non distended, bowel sounds +  MSK: move all 4 extremities spontaneously, left upper extremity in sling  Skin: warm  CNS: no acute focal neuro deficit      Additional Data:     Labs:    Results from last 7 days   Lab Units 22  0517   WBC Thousand/uL 7 72   HEMOGLOBIN g/dL 9 8*   HEMATOCRIT % 32 1*   PLATELETS Thousands/uL 224   NEUTROS PCT % 69   LYMPHS PCT % 19   MONOS PCT % 11   EOS PCT % 1     Results from last 7 days   Lab Units 22  0517 22  1354   POTASSIUM mmol/L 4 2 4 0   CHLORIDE mmol/L 104 100   CO2 mmol/L 28 27   BUN mg/dL 18 20   CREATININE mg/dL 1 10 1 39*   CALCIUM mg/dL 9 1 9 5   ALK PHOS U/L  --  109*   ALT U/L  --  <3*   AST U/L  --  7*           * I Have Reviewed All Lab Data Listed Above  * Additional Pertinent Lab Tests Reviewed:  [address] Admission Reviewed    Imaging:    Imaging Reports Reviewed Today Include:  Left shoulder and humerus x-ray  Imaging Personally Reviewed by Myself Includes:  None      Recent Cultures (last 7 days):           Last 24 Hours Medication List:   Current Facility-Administered Medications Medication Dose Route Frequency Provider Last Rate    acetaminophen  650 mg Oral Q6H PRN Belem Ji MD      apixaban  2 5 mg Oral BID Belem Ji MD      atorvastatin  80 mg Oral Daily Belem Ji MD      calcitriol  0 25 mcg Oral Daily Belem Ji MD      cholecalciferol  5,000 Units Oral Once per day on Mon Wed Fri Belem Ji MD      famotidine  20 mg Oral BID Belem Ji MD      insulin lispro  1-5 Units Subcutaneous TID [facility] Belem Ji MD      labetalol  10 mg Intravenous Q6H PRN Kelly Diaz MD      ondansetron  4 mg Intravenous Q6H PRN Belem Ji MD      PARoxetine  40 mg Oral Daily Belem Ji MD      sodium chloride  100 mL/hr Intravenous Continuous Kelly Diaz  mL/hr (05/13/22 4768)        Today, Patient Was Seen By: Kelly Diaz MD    ** Please Note: Dragon 360 Dictation voice to text software may have been used in the creation of this document   **

## 2022-05-13 NOTE — UTILIZATION REVIEW
Inpatient Admission Authorization Request   NOTIFICATION OF INPATIENT ADMISSION/INPATIENT AUTHORIZATION REQUEST   SERVICING FACILITY:   [place] [address]  [address] [place] [address] AFFILIATED WITH [facility] [address]  Tax ID: [id number]  NPI: [id number]  Place of Service: Inpatient [address]  Place of Service Code: 24     ATTENDING PROVIDER:  Attending Name and NPI#: [facility] [[id number]]  Address: [address] [place] [address] WITH [place] [address] [address] [place]  Phone: [phone]     UTILIZATION REVIEW CONTACT:  Cody Santana, Utilization   Network Utilization Review Department  Phone: [phone]  Fax [phone]  Email: [email or web address]     PHYSICIAN ADVISORY SERVICES:  FOR RVKT-FS-NCUB REVIEW - MEDICAL NECESSITY DENIAL  Phone: [phone]  Fax: [phone]  Email: [email or web address]     TYPE OF REQUEST:  Inpatient Status     ADMISSION INFORMATION:  ADMISSION DATE/TIME: 5/12/22 10:51 AM  PATIENT DIAGNOSIS CODE/DESCRIPTION:  Orthostatic hypotension [I95 1]  Dizziness [R42]  Lightheadedness [R42]  Syncope [R55]  Hypertensive urgency [I16 0]  DISCHARGE DATE/TIME: No discharge date for patient encounter  IMPORTANT INFORMATION:  Please contact the Cody Santana directly with any questions or concerns regarding this request  Department voicemails are confidential     Send requests for admission clinical reviews, concurrent reviews, approvals, and administrative denials due to lack of clinical to fax [phone]

## 2022-05-13 NOTE — UTILIZATION REVIEW
Continued Stay Review    Date: 5/13                          Current Patient Class: Inpatient   Current Level of Care: MEd/surg    HPI:77 y o  female initially admitted on 5/12     Assessment/Plan: As per respiratory, supplemental home oxygen is indicated  PEr PT/OT, recommending post acute rehab  ORtho consult:  X-rays of the patient's left shoulder are consistent with nondisplaced fractures of her left humeral head and left humeral neck  The fractures are in acceptable alignment  Stable injury not requiring surgical intervention at this time  5/12 Update: Rapid response after being found on the floor by staff  Unwitnessed and unsure if LOC  Complains of left arm pain with reduced ROM  CT head shows no acute abnormality  Xray shows left humeral neck fracture  1:1 observation in place  LUE placed in sling  Ortho consult  Vital Signs:    Temp Pulse Resp BP MAP (mmHg) SpO2 Calculated FIO2 (%) - Nasal Cannula Nasal Cannula O2 Flow Rate (L/min) O2 Device Patient Position - Orthostatic VS   05/13/22 12:30:31 -- 82 -- 89/53 Abnormal  65 98 % -- -- -- Sitting - Orthostatic VS   05/13/22 12:27:54 -- 93 -- 117/70 86 97 % -- -- -- Lying - Orthostatic VS   05/13/22 0830 -- -- -- -- -- 88 % Abnormal  -- -- None (Room air) --   05/13/22 07:24:05 98 1 °F (36 7 °C) 94 18 128/78 95 96 % 32 3 L/min Nasal cannula --   05/13/22 03:02:39 -- 101 -- 157/98 118 98 % -- -- -- --   05/12/22 23:44:42 -- 83 21 190/98 Abnormal  129 99 % -- -- -- --   05/12/22 23:18:23 -- 64 -- -- -- 100 % -- -- -- --   05/12/22 23:15:23 -- 81 -- -- -- 98 % -- --       Pertinent Labs/Diagnostic Results:   5/13 CT head:No acute intracranial abnormality  Marked chronic small vessel ischemic changes  5/13 Xray left humerus: Left humeral head and neck fracture     Results from last 7 days   Lab Units 05/11/22  1631   SARS-COV-2  Negative     Results from last 7 days   Lab Units 05/12/22  0517 05/11/22  1354   WBC Thousand/uL 7 72 7 28 HEMOGLOBIN g/dL 9 8* 10 1*   HEMATOCRIT % 32 1* 32 6*   PLATELETS Thousands/uL 224 240   NEUTROS ABS Thousands/µL 5 39 4 76         Results from last 7 days   Lab Units 05/12/22  0517 05/11/22  1354   SODIUM mmol/L 138 134*   POTASSIUM mmol/L 4 2 4 0   CHLORIDE mmol/L 104 100   CO2 mmol/L 28 27   ANION GAP mmol/L 6 7   BUN mg/dL 18 20   CREATININE mg/dL 1 10 1 39*   EGFR ml/min/1 73sq m 47 36   CALCIUM mg/dL 9 1 9 5     Results from last 7 days   Lab Units 05/11/22  1354   AST U/L 7*   ALT U/L <3*   ALK PHOS U/L 109*   TOTAL PROTEIN g/dL 6 7   ALBUMIN g/dL 3 4*   TOTAL BILIRUBIN mg/dL 0 64     Results from last 7 days   Lab Units 05/13/22  1135 05/13/22  0551 05/12/22  2307 05/12/22  1720 05/12/22  1159 05/11/22  1832   POC GLUCOSE mg/dl 178* 167* 186* 152* 199* 174*     Results from last 7 days   Lab Units 05/12/22  0517 05/11/22  1354   GLUCOSE RANDOM mg/dL 149* 171*         Results from last 7 days   Lab Units 05/12/22  0517   HEMOGLOBIN A1C % 7 6*   EAG mg/dl 171       Results from last 7 days   Lab Units 05/11/22  1354   HS TNI 0HR ng/L 7         Results from last 7 days   Lab Units 05/11/22  2328   CLARITY UA  Clear   COLOR UA  Straw   SPEC GRAV UA  1 015   PH UA  6 0   GLUCOSE UA mg/dl Trace*   KETONES UA mg/dl Negative   BLOOD UA  Negative   PROTEIN UA mg/dl Negative   NITRITE UA  Negative   BILIRUBIN UA  Negative   UROBILINOGEN UA E U /dl 0 2   LEUKOCYTES UA  Negative     Results from last 7 days   Lab Units 05/11/22  1631   INFLUENZA A PCR  Negative   INFLUENZA B PCR  Negative   RSV PCR  Negative         Medications:   Scheduled Medications:  apixaban, 2 5 mg, Oral, BID  atorvastatin, 80 mg, Oral, Daily  calcitriol, 0 25 mcg, Oral, Daily  cholecalciferol, 5,000 Units, Oral, Once per day on Mon Wed Fri  famotidine, 20 mg, Oral, BID  insulin lispro, 1-5 Units, Subcutaneous, TID AC  PARoxetine, 40 mg, Oral, Daily      Continuous IV Infusions:  sodium chloride, 100 mL/hr, Intravenous, Continuous      PRN Meds:  acetaminophen, 650 mg, Oral, Q6H PRN  labetalol, 10 mg, Intravenous, Q6H PRN  ondansetron, 4 mg, Intravenous, Q6H PRN        Discharge Plan: D    Network Utilization Review Department  ATTENTION: Please call with any questions or concerns to [phone] and carefully listen to the prompts so that you are directed to the right person  All voicemails are confidential   Daphnie Godinez all requests for admission clinical reviews, approved or denied determinations and any other requests to dedicated fax number below belonging to the campus where the patient is receiving treatment   List of dedicated fax numbers for the Facilities:  [address] DENIALS (Administrative/Medical Necessity) [phone]   [address] (Maternity/NICU/Pediatrics) [phone]   [address]  [address] [facility] [place] [address] [address] [place] [address] [address] [facility] [place] [address] [phone]

## 2022-05-13 NOTE — CASE MANAGEMENT
Case Management Discharge Planning Note    Patient name Ambar William  Location /-01 MRN [id number]  : 1945 Date 2022       Current Admission Date: 2022  Current Admission Diagnosis:Syncope   Patient Active Problem List    Diagnosis Date Noted    Lung mass 2022    Syncope 2022    Fracture of humeral shaft, right, closed 2022    GIULIANA (acute kidney injury) ([facility] ) 2020    History of DVT (deep vein thrombosis) 2020    Type 2 diabetes mellitus with stage 2 chronic kidney disease and hypertension ([facility] ) 2020    Stroke-like symptoms 2020    Pulmonary nodule 2020    History of breast cancer 2020    Hypertensive urgency 2020    Hypoxia 2019    Bronchospasm     Acute respiratory failure with hypoxia ([facility] ) 2019    DVT (deep venous thrombosis) ([place] [address] ) 2019    Tobacco abuse 2019    Stage 3 chronic kidney disease ([facility] ) 2019    Type 2 diabetes mellitus without complication, without long-term current use of insulin ([facility] ) 2019      LOS (days): 1  Geometric Mean LOS (GMLOS) (days): 2 30  Days to GMLOS:1 3     OBJECTIVE:  Risk of Unplanned Readmission Score: 15 38         Current admission status: Inpatient   Preferred Pharmacy:   [facility]/pharmacy #[id number] [place] [address] [place] [address]  Phone: [phone] Fax: [phone]    [facility]/pharmacy #[id number]  [place] [address]  Phone: [phone] Fax: [phone]    Primary Care Provider: Thea Lucas DO    Primary Insurance: [facility]  Secondary Insurance:     DISCHARGE DETAILS:    Discharge planning discussed with[de-identified] patient and daughter at the bedside     Comments - Freedom of Choice: pt's daughter would like rehab since her mother fell and has a fx humerus, list if facilities were given  pt will need authorization  CM contacted family/caregiver?: Yes             Contacts  Patient Contacts: Sandor Alford  Relationship to Patient[de-identified] Family (daughter)  Contact Method:  In Person  Reason/Outcome: Discharge Planning              Other Referral/Resources/Interventions Provided:  Interventions: Short Term Rehab  Referral Comments: pt is not active with [facility], referrals were sent to [facility],  [facility], [address] [place] [address] at [place] authorization will be needed         Treatment Team Recommendation: Short Term Rehab (short term rehab auth needed-transportation needed)

## 2022-05-13 NOTE — ASSESSMENT & PLAN NOTE
· Due to orthostatic hypotension   · Patient daughter reports patient was recently diagnosed with small-cell lung cancer, currently seeking hospice  · Has had poor p o   Intake over the past several days, upon getting up she reportedly had a syncopal spell  · Daughter reports twitching, but patient awoken briefly after with no post-ictal symptoms  · CT head negative  · Chest x-ray showing lung mass  · In ED, patient's initial blood pressure was 056P systolic, upon sitting 913E  · According to the daughter, patient is only on carvedilol at home  · On 05/12/2022 Patient was started on Norvasc 5 mg due to BPs being in the 200s over night, continue to hold home BP meds due to significant orthostatic hypotension  · Cardiology consulted - suggested orthostatic hypotension, no further cardiac workup recommended  · Troponin negative, EKG showed no acute ST changes   · Continue IV fluid 100 cc/hour  · PT and OT recommended rehab  · Check orthostatic vitals every shift  · IV labetalol for SBP >180  · Nephrology consulted for blood pressure medication management

## 2022-05-13 NOTE — PROGRESS NOTES
Pt daughter Wilmar Mejia updated on pt fall and that pt was down getting scans at this time  All questions answered

## 2022-05-13 NOTE — RESPIRATORY THERAPY NOTE
Home Oxygen Qualifying Test     Patient name: Shameka Posada        : 1945   Date of Test:  May 13, 2022  Diagnosis: Lung CA   Home Oxygen Test:    **Medicare Guidelines require item(s) 1-5 on all ambulatory patients or 1 and 2 on non-ambulatory patients  1  Baseline SPO2 on Room Air at rest 88 %   a  If <= 88% on Room Air add O2 via NC to obtain SpO2 >=88%  If LPM needed, document LPM 2 needed to reach =>88%    2  SPO2 during exertion on Room Air N/A  %  a  During exertion monitor SPO2  If SPO2 increases >=89%, do not add supplemental oxygen    3  SPO2 on Oxygen at Rest 99 % at 2 LPM    4  SPO2 during exertion on Oxygen  % at  LPM     [x]  Supplemental Home Oxygen is indicated  []  Client does not qualify for home oxygen  Respiratory Additional Notes-  Patient currently has oxygen HS  Now requiring continuously       Bowman [place], RRT

## 2022-05-14 PROBLEM — D64.9 ANEMIA: Status: ACTIVE | Noted: 2022-01-01

## 2022-05-14 NOTE — PLAN OF CARE
Problem: Potential for Falls  Goal: Patient will remain free of falls  Description: INTERVENTIONS:  - Educate patient/family on patient safety including physical limitations  - Instruct patient to call for assistance with activity   - Consult OT/PT to assist with strengthening/mobility   - Keep Call bell within reach  - Keep bed low and locked with side rails adjusted as appropriate  - Keep care items and personal belongings within reach  - Initiate and maintain comfort rounds  - Make Fall Risk Sign visible to staff  - Offer Toileting every 2 Hours, in advance of need  - Initiate/Maintain bed alarm  - Obtain necessary fall risk management equipment:   - Apply yellow socks and bracelet for high fall risk patients  - Consider moving patient to room near nurses station  Outcome: Progressing     Problem: MOBILITY - ADULT  Goal: Maintain or return to baseline ADL function  Description: INTERVENTIONS:  -  Assess patient's ability to carry out ADLs; assess patient's baseline for ADL function and identify physical deficits which impact ability to perform ADLs (bathing, care of mouth/teeth, toileting, grooming, dressing, etc )  - Assess/evaluate cause of self-care deficits   - Assess range of motion  - Assess patient's mobility; develop plan if impaired  - Assess patient's need for assistive devices and provide as appropriate  - Encourage maximum independence but intervene and supervise when necessary  - Involve family in performance of ADLs  - Assess for home care needs following discharge   - Consider OT consult to assist with ADL evaluation and planning for discharge  - Provide patient education as appropriate  Outcome: Progressing  Goal: Maintains/Returns to pre admission functional level  Description: INTERVENTIONS:  - Perform BMAT or MOVE assessment daily    - Set and communicate daily mobility goal to care team and patient/family/caregiver     - Collaborate with rehabilitation services on mobility goals if consulted  - Perform Range of Motion 3 times a day  - Reposition patient every 2 hours    - Dangle patient 3 times a day  - Out of bed for toileting  - Record patient progress and toleration of activity level   Outcome: Progressing     Problem: SAFETY ADULT  Goal: Patient will remain free of falls  Description: INTERVENTIONS:  - Educate patient/family on patient safety including physical limitations  - Instruct patient to call for assistance with activity   - Consult OT/PT to assist with strengthening/mobility   - Keep Call bell within reach  - Keep bed low and locked with side rails adjusted as appropriate  - Keep care items and personal belongings within reach  - Initiate and maintain comfort rounds  - Make Fall Risk Sign visible to staff  - Offer Toileting every 2 Hours, in advance of need  - Initiate/Maintain bed alarm  - Obtain necessary fall risk management equipment:   - Apply yellow socks and bracelet for high fall risk patients  - Consider moving patient to room near nurses station  Outcome: Progressing  Goal: Maintain or return to baseline ADL function  Description: INTERVENTIONS:  -  Assess patient's ability to carry out ADLs; assess patient's baseline for ADL function and identify physical deficits which impact ability to perform ADLs (bathing, care of mouth/teeth, toileting, grooming, dressing, etc )  - Assess/evaluate cause of self-care deficits   - Assess range of motion  - Assess patient's mobility; develop plan if impaired  - Assess patient's need for assistive devices and provide as appropriate  - Encourage maximum independence but intervene and supervise when necessary  - Involve family in performance of ADLs  - Assess for home care needs following discharge   - Consider OT consult to assist with ADL evaluation and planning for discharge  - Provide patient education as appropriate  Outcome: Progressing  Goal: Maintains/Returns to pre admission functional level  Description: INTERVENTIONS:  - Perform BMAT or MOVE assessment daily    - Set and communicate daily mobility goal to care team and patient/family/caregiver  - Collaborate with rehabilitation services on mobility goals if consulted  - Out of bed for toileting  - Record patient progress and toleration of activity level   Outcome: Progressing     Problem: DISCHARGE PLANNING  Goal: Discharge to home or other facility with appropriate resources  Description: INTERVENTIONS:  - Identify barriers to discharge w/patient and caregiver  - Arrange for needed discharge resources and transportation as appropriate  - Identify discharge learning needs (meds, wound care, etc )  - Arrange for interpretive services to assist at discharge as needed  - Refer to Case Management Department for coordinating discharge planning if the patient needs post-hospital services based on physician/advanced practitioner order or complex needs related to functional status, cognitive ability, or social support system  Outcome: Progressing     Problem: Knowledge Deficit  Goal: Patient/family/caregiver demonstrates understanding of disease process, treatment plan, medications, and discharge instructions  Description: Complete learning assessment and assess knowledge base    Interventions:  - Provide teaching at level of understanding  - Provide teaching via preferred learning methods  Outcome: Progressing     Problem: Prexisting or High Potential for Compromised Skin Integrity  Goal: Skin integrity is maintained or improved  Description: INTERVENTIONS:  - Identify patients at risk for skin breakdown  - Assess and monitor skin integrity  - Assess and monitor nutrition and hydration status  - Monitor labs   - Assess for incontinence   - Turn and reposition patient  - Assist with mobility/ambulation  - Relieve pressure over bony prominences  - Avoid friction and shearing  - Provide appropriate hygiene as needed including keeping skin clean and dry  - Evaluate need for skin moisturizer/barrier cream  - Collaborate with interdisciplinary team   - Patient/family teaching  - Consider wound care consult   Outcome: Progressing

## 2022-05-14 NOTE — ASSESSMENT & PLAN NOTE
Lab Results   Component Value Date    EGFR 34 05/14/2022    EGFR 47 05/12/2022    EGFR 36 05/11/2022    CREATININE 1 44 (H) 05/14/2022    CREATININE 1 10 05/12/2022    CREATININE 1 39 (H) 05/11/2022     · Creatinine is elevated from yesterday, Renal functions appear to be within baseline range  · Continue IV fluid  · Nephrology was consulted

## 2022-05-14 NOTE — CONSULTS
Consultation - Nephrology   Kiarra Davis 76 y o  female MRN: [id number]  Unit/Bed#: -01 Encounter: [id number]      A/P:  1  Chronic kidney disease stage 3   - Admitted with a creatinine of 1 39 mg/dl -> 1 1 -> 1 44 mg/dl   - Will recheck labs to rule out error   - check urine electrolytes   - continue IVF as she has reasons for volume depletion due to decreased po intake   - agree with normal saline in view of orthostasis   - check a kidney ultrasound-no renal imaging noted   - UA is bland    2  Hypertensive urgency   - blood pressure laney to 214/91 but drops when standing   - currently 167/94   - not on antihyeprtensive agents here or at home   - check cortisol, aldosterone levels   - check urine sodium and creatinine   - may need venodynes    3  Secondary hyperparathyroidism of renal origin   - taking calcitriol  Will check PT    4  History of acute kidney injury   - was evaluated in [facility] in November of 2020 with acute kidney injury   - responded to volume repletion    5  Lung mass   - has small cell CA of the lung    - was a smoker   - received 5 chemotherapy treatments overall I cannot find details of the treatment in [facility] or in Care everywhere   - has elected hospice             Thank you for allowing us to participate in the care of your patient  Please feel free to contact us regarding the care of this patient, or any other questions/concerns that may be applicable      Patient Active Problem List   Diagnosis    Type 2 diabetes mellitus with stage 2 chronic kidney disease and hypertension (HCC)    Stroke-like symptoms    Pulmonary nodule    History of breast cancer    Hypertensive urgency    History of DVT (deep vein thrombosis)    [facility]    Acute respiratory failure with hypoxia (HCC)    DVT (deep venous thrombosis) [facility]    Tobacco abuse    Stage 3 chronic kidney disease (HCC)    Type 2 diabetes mellitus without complication, without long-term current use of insulin (HCC)    Bronchospasm    Hypoxia    Syncope    Fracture of humeral shaft, right, closed    Lung mass    Humeral fracture    Anemia       History of Present Illness   Physician Requesting Consult: Markos Ann MD  Reason for Consult / Principal Problem: chronic kidney disease stage 3 and hypertension with orthostatic changes  Hx and PE limited by:   HPI: Josse Castillo is a 76y o  year old female who presents with a fall at home sustaining a nondisplaced fracture of her left humeral head and neck  She was palced in a sling by orthopedics  She had a syncopal episode at home and fell  She is had decreased p o  intake over the last several days prior to admission on May 11th  The patient states she has been eating and drinking well  However the daughter reports the patient was not  She was admitted in to the emergency department with a blood pressure 190 sitting and 160 standing over 100  She has orthostatic hypotension, acute kidney injury and a lung mass  The patient was recently diagnosed with small cell cook cancer  She underwent 5 chemotherapy treatments was unable to tolerate it  She has elected hospice treatment  She has a history of diabetes mellitus for at least 10 years  She was evaluated at [facility] in November with acute kidney injury  At that point she was treated with IV hydration and improved to a creatinine of 1 56   Etiology of chronic kidney disease was thought to be due to diabetes mellitus, hypertensive nephrosclerosis and age-related nephron loss  At that time medications were held  She is not on any Ace inhibitor  There is no imaging of her kidneys  Records suggest that she has chronic kidney disease stage 3 with a baseline creatinine of 1 6-1 7 mg/dL  She denies NSAIDs  She is an active smoker until recently    She wears oxygen 247 at 2 liters/minute      History obtained from chart review and the patient    Constitutional ROS- has fatigue no , fever, chills, night sweats, weight changes  HEENT ROS- Denies history of eye surgeries, glaucoma, headaches or history of trauma, blurred vision     Endocrine ROS- Has 10 yr history diabetes mellitus no thyroid disease  Cardiovascular ROS- Denies chest pain, palpitation, has dyspnea exertion, orthopnea,   Pulmonary ROS- Has history of COPD,   Denies cough, hemoptysis,hs  shortness of breath  GI ROS- Denies abdominal pain, diarrhea, nausea, swallowing problems, vomiting, constipation, blood in stools, fecal incontinence  Hematological ROS- Denies history of easy bruising, blood clots, bleeding   Genitourinary ROS- Denies recent hematuria, pyuria, flank pain, change in urinary stream, decreased urinary output, increased urinary frequency, nocturia, foamy urine, or urinary incontinence  Lymphatic ROS- Denies lymphadenopathy  Musculoskeletal ROS- Has history of muscle weakness, joint pain  Dermatological ROS- Denies rash,   Psychiatric ROS-   Neurological ROS- No stroke or TIA symptoms        Historical Information   Past Medical History:   Diagnosis Date    Anxiety     Anxiety with depression     Breast cancer [facility])     December 2019 - right sided    [facility] [facility]     Depression     Diabetes mellitus ([facility]     Hypertension     Port-A-Cath in place     Left    TIA (transient ischemic attack)     only a possible diagnosis    TIA (transient ischemic attack)     "possible"     Past Surgical History:   Procedure Laterality Date    AXILLARY NODE DISSECTION Bilateral     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    AXILLARY NODE DISSECTION      CHOLECYSTECTOMY      FOOT SURGERY      IR PORT PLACEMENT      REMOVAL VENOUS PORT (PORT-A-CATH)  04/06/2022    SIMPLE MASTECTOMY Right     TONSILLECTOMY      TONSILLECTOMY AND ADENOIDECTOMY      WISDOM TOOTH EXTRACTION      WRIST SURGERY Right      Social History   Social History     Substance and Sexual Activity   Alcohol Use Never     Social History     Substance and Sexual Activity   Drug Use Never     Social History     Tobacco Use   Smoking Status Former Smoker    Packs/day: 0 25    Years: 62 00    Pack years: 15 50    Types: Cigarettes    Start date:     Quit date: 10/25/2020    Years since quittin 5   Smokeless Tobacco Never Used   Tobacco Comment    stopped smoking 4 weeks ago     Family History   Problem Relation Age of Onset    No Known Problems Mother        Meds/Allergies   all current active meds have been reviewed, current meds:   Current Facility-Administered Medications   Medication Dose Route Frequency    acetaminophen (TYLENOL) tablet 650 mg  650 mg Oral Q6H PRN    apixaban (ELIQUIS) tablet 2 5 mg  2 5 mg Oral BID    atorvastatin (LIPITOR) tablet 80 mg  80 mg Oral Daily    calcitriol (ROCALTROL) capsule 0 25 mcg  0 25 mcg Oral Daily    cholecalciferol (VITAMIN D3) tablet 5,000 Units  5,000 Units Oral Once per day on     famotidine (PEPCID) tablet 20 mg  20 mg Oral BID    insulin lispro (HumaLOG) 100 units/mL subcutaneous injection 1-5 Units  1-5 Units Subcutaneous TID AC    labetalol (NORMODYNE) injection 10 mg  10 mg Intravenous Q6H PRN    ondansetron (ZOFRAN) injection 4 mg  4 mg Intravenous Q6H PRN    PARoxetine (PAXIL) tablet 40 mg  40 mg Oral Daily    sodium chloride 0 9 % infusion  100 mL/hr Intravenous Continuous    and PTA meds:    Medications Prior to Admission   Medication    apixaban (ELIQUIS) 2 5 mg    atorvastatin (LIPITOR) 80 mg tablet    calcitriol (ROCALTROL) 0 25 mcg capsule    cholecalciferol (VITAMIN D3) 1,000 units tablet    famotidine (PEPCID) 20 mg tablet    LINAGLIPTIN PO    magnesium gluconate (MAGONATE) 500 mg tablet    omeprazole (PriLOSEC) 40 MG capsule    PARoxetine (PAXIL) 40 MG tablet    repaglinide (PRANDIN) 1 mg tablet    traMADol (Ultram) 50 mg tablet         No Known Allergies    Objective     Intake/Output Summary (Last 24 hours) at 2022 1134  Last data filed at 5/14/2022 0744  Gross per 24 hour   Intake 1335 ml   Output --   Net 1335 ml       Invasive Devices:        Physical Exam      I/O last 3 completed shifts: In: 600 [P O :600]  Out: -     Vitals:    05/14/22 0743   BP: 167/93   Pulse: 100   Resp:    Temp: 98 1 °F (36 7 °C)   SpO2: 95%       General Appearance:     Dyspneic at rest  Cooperative  Appears stated age  Head:    Normocephalic  Atraumatic  Normal jaw occlusion  Eyes:    Lids, conjunctiva normal  No scleral icterus  Ears:    Normal external ears  Nose:   Nares normal  No drainage  Mouth:   Lips, tongue normal  Mucosa normal  Phonation normal    Neck:   Supple  Symmetrical    Back:     Symmetric  No CVA tenderness  Lungs:     Increased  respiratory effort  Decreased to auscultation bilaterally  Chest wall:    No tenderness or deformity  Heart:    Regular rate and rhythm  Normal S1 and S2  No murmur  No JVD  No edema  Abdomen:     Soft  Non-tender  Bowel sounds active  Genitourinary:   No Velasquez catheter present  Has a Pure wick with dark yellow urine   Extremities:   Extremities normal  Atraumatic  No cyanosis  Skin:   Warm and dry  Has pallor,   Neurologic:   Alert and cooperative and conversant and moving arms and legs and assists with exam          Current Weight: Weight - Scale: 76 7 kg (169 lb 1 5 oz)  First Weight: Weight - Scale: 75 4 kg (166 lb 3 6 oz)    Lab Results:  I have personally reviewed pertinent labs      CBC:   Lab Results   Component Value Date    WBC 8 57 05/14/2022    HGB 8 3 (L) 05/14/2022    HCT 27 1 (L) 05/14/2022    MCV 88 05/14/2022     05/14/2022    MCH 26 9 05/14/2022    MCHC 30 6 (L) 05/14/2022    RDW 16 3 (H) 05/14/2022    MPV 9 3 05/14/2022    NRBC 0 05/14/2022     CMP:   Lab Results   Component Value Date    K 4 3 05/14/2022     05/14/2022    CO2 27 05/14/2022    BUN 19 05/14/2022    CREATININE 1 44 (H) 05/14/2022    CALCIUM 8 5 05/14/2022    EGFR 34 05/14/2022     Phosphorus: No results found for: PHOS  Magnesium: No results found for: MG  Urinalysis: No results found for: COLORU, CLARITYU, SPECGRAV, PHUR, LEUKOCYTESUR, NITRITE, PROTEINUA, GLUCOSEU, KETONESU, BILIRUBINUR, BLOODU  Ionized Calcium: No results found for: CAION  Coagulation: No results found for: PT, INR, APTT  Troponin: No results found for: TROPONINI  ABG: No results found for: PHART, GPB2MFI, PO2ART, JIB3RSJ, C3NYACIL, BEART, SOURCE    Results from last 7 days   Lab Units 05/14/22  0447 05/12/22  0517 05/11/22  1354   POTASSIUM mmol/L 4 3 4 2 4 0   CHLORIDE mmol/L 101 104 100   CO2 mmol/L 27 28 27   BUN mg/dL 19 18 20   CREATININE mg/dL 1 44* 1 10 1 39*   CALCIUM mg/dL 8 5 9 1 9 5   ALK PHOS U/L  --   --  109*   ALT U/L  --   --  <3*   AST U/L  --   --  7*       Radiology review:  Procedure: XR shoulder 2+ vw left    Result Date: 5/13/2022  Narrative: LEFT SHOULDER INDICATION:   s/p fall, decreased rom  COMPARISON:  None VIEWS:  XR SHOULDER 2+ VW LEFT FINDINGS: Left humeral head and neck fracture with slight impaction of fracture fragments  No significant angulation or displacement  No significant degenerative changes  No lytic or blastic osseous lesion  Soft tissues are unremarkable  Impression: Left humeral head and neck fracture as above  Workstation performed: [id number]     Procedure: XR humerus left    Result Date: 5/13/2022  Narrative: LEFT HUMERUS INDICATION:   r/o fracture sp fall  COMPARISON:  None VIEWS:  XR HUMERUS LEFT FINDINGS: Left humeral head and neck fracture with slight impaction of fracture fragments  No significant angulation or displacement of fracture fragments  No significant degenerative changes  No lytic or blastic osseous lesion  Soft tissues are unremarkable  Impression: Left humeral head and neck fracture   Workstation performed: [id number]     Procedure: CT head wo contrast    Result Date: 5/13/2022  Narrative: CT BRAIN - WITHOUT CONTRAST INDICATION:   Head trauma, minor (Age >= 65y) s/p fall r/o bleed  COMPARISON:  CT brain dated April 26, 2022  TECHNIQUE:  CT examination of the brain was performed  In addition to axial images, sagittal and coronal 2D reformatted images were created and submitted for interpretation  Radiation dose length product (DLP) for this visit:  791 mGy-cm   This examination, like all CT scans performed in the [facility], was performed utilizing techniques to minimize radiation dose exposure, including the use of iterative reconstruction and automated exposure control  IMAGE QUALITY:  Diagnostic  FINDINGS: PARENCHYMA:  No intracranial mass, mass effect or midline shift  No CT signs of acute infarction  No acute parenchymal hemorrhage  There is marked periventricular white matter low attenuation which is nonspecific and most likely related to chronic small vessel ischemic changes  VENTRICLES AND EXTRA-AXIAL SPACES:  Normal for the patient's age  VISUALIZED ORBITS AND PARANASAL SINUSES:  Unremarkable  CALVARIUM AND EXTRACRANIAL SOFT TISSUES:  Normal      Impression: No acute intracranial abnormality  Marked chronic small vessel ischemic changes  Workstation performed: [id number]         EKG, Pathology, and Other Studies: I have reviewed current and prior notes and labs  There is no data in Care Everywhere  30      Counseling / Coordination of Care  Total ADDITIONAL floor / unit time spent today 40 minutes  Greater than 50% of total time was spent with the patient and / or family counseling and / or coordination of care  A description of the counseling / coordination of care: follows    Arnol Lowry MD      This consultation note was produced in part using a dictation device which may document imprecise wording from author's original intent

## 2022-05-14 NOTE — PROGRESS NOTES
[address]  Progress Note Brandon Escobar 1945, 76 y o  female MRN: [id number]  Unit/Bed#: -01 Encounter: [id number]  Primary Care Provider: Alisson Encinas DO   Date and time admitted to hospital: 5/11/2022  1:44 PM    * Syncope  Assessment & Plan  · Due to orthostatic hypotension   · Patient daughter reports patient was recently diagnosed with small-cell lung cancer, currently seeking hospice  · Has had poor p o   Intake over the past several days, upon getting up she reportedly had a syncopal spell  · Daughter reports twitching, but patient awoken briefly after with no post-ictal symptoms  · CT head negative  · Chest x-ray showing lung mass  · In ED, patient's initial blood pressure was 999H systolic, upon sitting 510K  · According to the daughter, patient is only on carvedilol at home  · On 05/12/2022 Patient was started on Norvasc 5 mg due to BPs being in the 200s over night  · Cardiology consulted - suggested orthostatic hypotension, no further cardiac workup recommended  · Troponin negative, EKG showed no acute ST changes   · Continue IV fluid 100 cc/hour  · PT and OT recommended rehab  · IV labetalol for SBP >180  · Patient was on carvedilol at home, continue for now  · Order compression stockings  · Patient is medically clear, awaiting rehab      Humeral fracture  Assessment & Plan  · Patient had a fall at the night of 05/12/2022, with nondisplaced fracture  · Orthopedic consult - recommended conservative management  · PT and OT recommended rehab    Anemia  Assessment & Plan  · Most likely dilutional  · Hemoglobin slowly downtrending  · Patient had recent humeral fracture, no evidence of hematoma  · Follow CBC    Lung mass  Assessment & Plan  · Chest x-ray on admission shows left paramediastinal mass, left upper lobe mass and left pleural effusion  · Patient [facility] Oncology at [facility]  · Unable to review documentation taking care everywhere  · Per daughter, reports diagnosis of small-cell lung cancer, recently had bed port removed declining further chemotherapy  · Daughter said she does not want to pursue hospice until she sees the oncologist  · Case mgmt consulted for further review    Type 2 diabetes mellitus without complication, without long-term current use of insulin [facility])  Assessment & Plan  Lab Results   Component Value Date    HGBA1C 7 6 (H) 05/12/2022     · Hold home p o  Diabetic medication  · Hb A1c 7 7, sliding scale and Accu-Cheks    Stage 3 chronic kidney disease [facility])  Assessment & Plan  Lab Results   Component Value Date    EGFR 34 05/14/2022    EGFR 47 05/12/2022    EGFR 36 05/11/2022    CREATININE 1 44 (H) 05/14/2022    CREATININE 1 10 05/12/2022    CREATININE 1 39 (H) 05/11/2022     · Creatinine is elevated from yesterday, Renal functions appear to be within baseline range  · Continue IV fluid  · Nephrology was consulted    DVT (deep venous thrombosis) ([facility] [address] )  Assessment & Plan  · History of DVT, on Eliquis 2 5 b i d , will continue    History of breast cancer  Assessment & Plan  · Patient reports history of breast cancer status post resection and chemotherapy  · Currently in surveillance, [place] [facility]    VTE Pharmacologic Prophylaxis:   Pharmacologic: Apixaban (Eliquis)  Mechanical VTE Prophylaxis in Place: Yes    Patient Centered Rounds: I have performed bedside rounds with nursing staff today  Discussions with Specialists or Other Care Team Provider:  Discussed with Case Management, nurse    Education and Discussions with Family / Patient:  Discussed with patient    Time Spent for Care: 45 minutes  More than 50% of total time spent on counseling and coordination of care as described above      Current Length of Stay: 2 day(s)    Current Patient Status: Inpatient   Certification Statement: The patient will continue to require additional inpatient hospital stay due to Pending placement    Discharge Plan / Estimated Discharge Date:  Pending placement      Code Status: Level 3 - DNAR and DNI      Subjective:   Patient was seen and examined at bedside  The patient denies any pain, headache, blurry vision, chest pain, palpitation, shortness of breath, N/V, abd pain  Objective:     Vitals:   Temp (24hrs), Av 2 °F (36 8 °C), Min:98 1 °F (36 7 °C), Max:98 3 °F (36 8 °C)    Temp:  [98 1 °F (36 7 °C)-98 3 °F (36 8 °C)] 98 1 °F (36 7 °C)  HR:  [100] 100  BP: (167-170)/(93-94) 167/93  SpO2:  [94 %-95 %] 95 %  Body mass index is 29 95 kg/m²  Input and Output Summary (last 24 hours): Intake/Output Summary (Last 24 hours) at 2022 1550  Last data filed at 2022 0744  Gross per 24 hour   Intake 1095 ml   Output --   Net 1095 ml       Physical Exam:     Physical Exam  General: breathing well on room air, no acute distress  HEENT: NC/AT, PERRL, EOM - normal  Neck: Supple  Pulm/Chest: Normal chest wall expansion, clear breath sounds on both side, no wheezing/rhonchi or crackles appreciated  CVS: RRR, normal S1&S2, no murmur appreciated, capillary refill <2s  Abd: soft, non tender, non distended, bowel sounds +  MSK: move all 4 extremities spontaneously, left upper extremity in sling, no evidence of bruising  Skin: warm  CNS: no acute focal neuro deficit      Additional Data:     Labs:    Results from last 7 days   Lab Units 22  0447   WBC Thousand/uL 8 57   HEMOGLOBIN g/dL 8 3*   HEMATOCRIT % 27 1*   PLATELETS Thousands/uL 211   NEUTROS PCT % 69   LYMPHS PCT % 18   MONOS PCT % 11   EOS PCT % 1     Results from last 7 days   Lab Units 22  0517 22  1354   POTASSIUM mmol/L 4 3   < > 4 0   CHLORIDE mmol/L 101   < > 100   CO2 mmol/L 27   < > 27   BUN mg/dL 19   < > 20   CREATININE mg/dL 1 44*   < > 1 39*   CALCIUM mg/dL 8 5   < > 9 5   ALK PHOS U/L  --   --  109*   ALT U/L  --   --  <3*   AST U/L  --   --  7*    < > = values in this interval not displayed  * I Have Reviewed All Lab Data Listed Above    * Additional Pertinent Lab Tests Reviewed: [address] Admission Reviewed    Imaging:    Imaging Reports Reviewed Today Include: None  Imaging Personally Reviewed by Myself Includes:  None      Recent Cultures (last 7 days):           Last 24 Hours Medication List:   Current Facility-Administered Medications   Medication Dose Route Frequency Provider Last Rate    acetaminophen  650 mg Oral Q6H PRN Corey Sutherland MD      apixaban  2 5 mg Oral BID Corey Sutherland MD      atorvastatin  80 mg Oral Daily Corey Sutherland MD      calcitriol  0 25 mcg Oral Daily Corey Sutherland MD      carvedilol  3 125 mg Oral BID With Meals Ciera Rowley MD      cholecalciferol  5,000 Units Oral Once per day on Mon Wed Fri Corey Sutherland MD      famotidine  20 mg Oral BID Corey Sutherland MD      insulin lispro  1-5 Units Subcutaneous TID [facility] Corey Sutherland MD      labetalol  10 mg Intravenous Q6H PRN Ciera Rowley MD      ondansetron  4 mg Intravenous Q6H PRN Corey Sutherland MD      PARoxetine  40 mg Oral Daily Ciera Rowley MD      sodium chloride  100 mL/hr Intravenous Continuous Ciera Rowley  mL/hr (05/14/22 0744)        Today, Patient Was Seen By: Ciera Rowley MD    ** Please Note: Dragon 360 Dictation voice to text software may have been used in the creation of this document   **

## 2022-05-14 NOTE — ASSESSMENT & PLAN NOTE
· Chest x-ray on admission shows left paramediastinal mass, left upper lobe mass and left pleural effusion  · Patient follows Oncology at [facility]  · Unable to review documentation taking care everywhere  · Per daughter, reports diagnosis of small-cell lung cancer, recently had bed port removed declining further chemotherapy  · Daughter said she does not want to pursue hospice until she sees the oncologist  · Case mgmt consulted for further review

## 2022-05-14 NOTE — ASSESSMENT & PLAN NOTE
· Due to orthostatic hypotension   · Patient daughter reports patient was recently diagnosed with small-cell lung cancer, currently seeking hospice  · Has had poor p o   Intake over the past several days, upon getting up she reportedly had a syncopal spell  · Daughter reports twitching, but patient awoken briefly after with no post-ictal symptoms  · CT head negative  · Chest x-ray showing lung mass  · In ED, patient's initial blood pressure was 033K systolic, upon sitting 179E  · According to the daughter, patient is only on carvedilol at home  · On 05/12/2022 Patient was started on Norvasc 5 mg due to BPs being in the 200s over night  · Cardiology consulted - suggested orthostatic hypotension, no further cardiac workup recommended  · Troponin negative, EKG showed no acute ST changes   · Continue IV fluid 100 cc/hour  · PT and OT recommended rehab  · IV labetalol for SBP >180  · Patient was on carvedilol at home, continue for now  · Order compression stockings  · Patient is medically clear, awaiting rehab

## 2022-05-14 NOTE — ASSESSMENT & PLAN NOTE
· Most likely dilutional  · Hemoglobin slowly downtrending  · Patient had recent humeral fracture, no evidence of hematoma  · Follow CBC

## 2022-05-14 NOTE — PROGRESS NOTES
While doing orthostatic blood pressure  Pt sat at edge of bed with feet on the floor  Pt c/o dizziness and blood pressure did drop  Pt's color got very pale and grey  Had pt lay back in to bed and she felt a lot better  Informed Dr Shepherd

## 2022-05-15 NOTE — ASSESSMENT & PLAN NOTE
· Patient had a fall at the night of 05/12/2022, with nondisplaced fracture  · Orthopedic consult - recommended conservative management  · PT and OT recommended rehab  · Due to other clinical comorbidities, the daughter is not sure whether she really wants the patient to be in the rehab at this time    The daughter will discuss with hospice team

## 2022-05-15 NOTE — PROGRESS NOTES
Progress Note - Nephrology   Thelma Valentine 76 y o  female MRN: [id number]  Unit/Bed#: -01 Encounter: [id number]    A/P:  1  Acute kidney injury   - creatinine dropped from 1 44 mg/dL to 1 06 mg/dL today   - nonoliguric but marked positive balance  Plus three 456 7 mils   - DC IV fluid   - urine studies are in process    2  Accelerated hypertension   - blood pressure laney to 224/120 and dropped to 184/92 - pulses 112   - increase carvedilol to 12 5 mg twice a day  Give a dose now    3  Dyspnea   - stop IVF and obtain a CXR    4  Secondary hyperparathyroidism of renal origin   - taking calcitriol and PTH check is in process    5  History of acute kidney injury   - was evaluated in [facility] in November of 2020 with acute kidney injury which responded to volume depletion    6   Lung mass   - has small cell CA of the lung and did receive 5 chemotherapy treatments with ill effects   - does not wish to proceed with further treatments and has elected hospice      Follow up reason for today's visit:  Chronic kidney disease stage 3/hypertensive urgency with orthostatic changes    Syncope    Patient Active Problem List   Diagnosis    Type 2 diabetes mellitus with stage 2 chronic kidney disease and hypertension ([facility] [address] )    Stroke-like symptoms    Pulmonary nodule    History of breast cancer    Hypertensive urgency    History of DVT (deep vein thrombosis)    GIULIANA (acute kidney injury) ([facility] [address] )    Acute respiratory failure with hypoxia (HCC)    DVT (deep venous thrombosis) (HCC)    Tobacco abuse    Stage 3 chronic kidney disease (HCC)    Type 2 diabetes mellitus without complication, without long-term current use of insulin (HCC)    Bronchospasm    Hypoxia    Syncope    Fracture of humeral shaft, right, closed    Lung mass    Humeral fracture    Anemia         Subjective:   A 10 point review of systems is negative other than complaining of ambulatory dysfunction and dyspnea    Objective:     Vitals: Blood pressure (!) 184/92, pulse (!) 112, temperature 98 3 °F (36 8 °C), temperature source Oral, resp  rate 22, height 5' 3" (1 6 m), weight 73 1 kg (161 lb 2 5 oz), SpO2 94 %  ,Body mass index is 28 55 kg/m²  Weight (last 2 days)     Date/Time Weight    05/15/22 0600 73 1 (161 16)    05/14/22 0543 76 7 (169 09)    05/13/22 0549 77 (169 75)            Intake/Output Summary (Last 24 hours) at 5/15/2022 1129  Last data filed at 5/15/2022 1009  Gross per 24 hour   Intake 2631 67 ml   Output 400 ml   Net 2231 67 ml     I/O last 3 completed shifts: In: 1975 [I V :1975]  Out: 400 [Urine:400]         Physical Exam: BP (!) 184/92   Pulse (!) 112   Temp 98 3 °F (36 8 °C) (Oral)   Resp 22   Ht 5' 3" (1 6 m)   Wt 73 1 kg (161 lb 2 5 oz)   SpO2 94%   BMI 28 55 kg/m²     General Appearance:    Alert, does not feel well cooperative, no distress, appears stated age   Head:    Normocephalic, without obvious abnormality, atraumatic   Eyes:    Conjunctiva/corneas clear   Ears:    Normal external ears   Nose:   Nares normal, septum midline, mucosa normal, no drainage    or sinus tenderness   Throat:   Lips, mucosa, and tongue normal; teeth and gums normal   Neck:   Supple, symmetrical, trachea midline, no adenopathy;        thyroid:  No enlargement/tenderness/nodules; no carotid    bruit or JVD   Back:     Symmetric, no curvature, ROM normal, no CVA tenderness   Lungs:     Decreased to auscultation bilaterally, respirations unlabored   Chest wall:    No tenderness or deformity   Heart:    Regular rate and rhythm, S1 and S2 normal, no murmur, rub   or gallop   Abdomen:     Soft, non-tender, bowel sounds active  Incontinent of yellow urine   Extremities:   Extremities normal, atraumatic, no cyanosis or edema   Skin:   Skin color, texture, turgor normal, no rashes or lesions   Lymph nodes:   Cervical normal   Neurologic:   CNII-XII moving extremities    Left upper extremity in sling            Lab, Imaging and other studies: I have personally reviewed pertinent labs  CBC:   Lab Results   Component Value Date    WBC 8 60 05/15/2022    HGB 8 1 (L) 05/15/2022    HCT 26 6 (L) 05/15/2022    MCV 88 05/15/2022     05/15/2022    MCH 26 7 (L) 05/15/2022    MCHC 30 5 (L) 05/15/2022    RDW 16 2 (H) 05/15/2022    MPV 9 0 05/15/2022    NRBC 0 05/15/2022     CMP:   Lab Results   Component Value Date    K 4 4 05/15/2022     05/15/2022    CO2 27 05/15/2022    BUN 17 05/15/2022    CREATININE 1 06 05/15/2022    CALCIUM 8 6 05/15/2022    AST 10 (L) 05/15/2022    ALT 6 (L) 05/15/2022    ALKPHOS 89 05/15/2022    EGFR 50 05/15/2022         Results from last 7 days   Lab Units 05/15/22  0447 05/14/22  0447 05/12/22  0517 05/11/22  1354   POTASSIUM mmol/L 4 4 4 3 4 2 4 0   CHLORIDE mmol/L 103 101 104 100   CO2 mmol/L 27 27 28 27   BUN mg/dL 17 19 18 20   CREATININE mg/dL 1 06 1 44* 1 10 1 39*   CALCIUM mg/dL 8 6 8 5 9 1 9 5   ALK PHOS U/L 89  --   --  109*   ALT U/L 6*  --   --  <3*   AST U/L 10*  --   --  7*         Phosphorus: No results found for: PHOS  Magnesium: No results found for: MG  Urinalysis:   Lab Results   Component Value Date    COLORU Straw 05/14/2022    CLARITYU Clear 05/14/2022    SPECGRAV 1 015 05/14/2022    PHUR 6 0 05/14/2022    LEUKOCYTESUR Negative 05/14/2022    NITRITE Negative 05/14/2022    GLUCOSEU 1+ (A) 05/14/2022    KETONESU Negative 05/14/2022    BILIRUBINUR Negative 05/14/2022    BLOODU Trace-Intact (A) 05/14/2022     Ionized Calcium: No results found for: CAION  Coagulation: No results found for: PT, INR, APTT  Troponin: No results found for: TROPONINI  ABG: No results found for: PHART, WEF5XTT, PO2ART, SAT3SYN, J5OROAOC, BEART, SOURCE  Radiology review:     IMAGING  Procedure: XR shoulder 2+ vw left    Result Date: 5/13/2022  Narrative: LEFT SHOULDER INDICATION:   s/p fall, decreased rom   COMPARISON:  None VIEWS:  XR SHOULDER 2+ VW LEFT FINDINGS: Left humeral head and neck fracture with slight impaction of fracture fragments  No significant angulation or displacement  No significant degenerative changes  No lytic or blastic osseous lesion  Soft tissues are unremarkable  Impression: Left humeral head and neck fracture as above  Workstation performed: [id number]     Procedure: XR humerus left    Result Date: 5/13/2022  Narrative: LEFT HUMERUS INDICATION:   r/o fracture sp fall  COMPARISON:  None VIEWS:  XR HUMERUS LEFT FINDINGS: Left humeral head and neck fracture with slight impaction of fracture fragments  No significant angulation or displacement of fracture fragments  No significant degenerative changes  No lytic or blastic osseous lesion  Soft tissues are unremarkable  Impression: Left humeral head and neck fracture  Workstation performed: [id number]     Procedure: CT head wo contrast    Result Date: 5/13/2022  Narrative: CT BRAIN - WITHOUT CONTRAST INDICATION:   Head trauma, minor (Age >= 65y) s/p fall r/o bleed  COMPARISON:  CT brain dated April 26, 2022  TECHNIQUE:  CT examination of the brain was performed  In addition to axial images, sagittal and coronal 2D reformatted images were created and submitted for interpretation  Radiation dose length product (DLP) for this visit:  791 mGy-cm   This examination, like all CT scans performed in the [facility], was performed utilizing techniques to minimize radiation dose exposure, including the use of iterative reconstruction and automated exposure control  IMAGE QUALITY:  Diagnostic  FINDINGS: PARENCHYMA:  No intracranial mass, mass effect or midline shift  No CT signs of acute infarction  No acute parenchymal hemorrhage  There is marked periventricular white matter low attenuation which is nonspecific and most likely related to chronic small vessel ischemic changes  VENTRICLES AND EXTRA-AXIAL SPACES:  Normal for the patient's age  VISUALIZED ORBITS AND PARANASAL SINUSES:  Unremarkable   CALVARIUM AND EXTRACRANIAL SOFT TISSUES:  Normal  Impression: No acute intracranial abnormality  Marked chronic small vessel ischemic changes  Workstation performed: [id number]     Procedure: US kidney and bladder    Result Date: 5/14/2022  Narrative: RENAL ULTRASOUND INDICATION:   wes  COMPARISON: None TECHNIQUE:   Ultrasound of the retroperitoneum was performed with a curvilinear transducer utilizing volumetric sweeps and still imaging techniques  FINDINGS: KIDNEYS: Symmetric and normal size  Right kidney:  9 6 x 4 7 x 4 1 cm  Volume 98 4 mL Left kidney:  10 6 x 5 9 x 3 9 cm  Volume 127 2 mL Right kidney Normal echogenicity and contour  No mass is identified  1 3 cm cortical cyst in the mid kidney  No hydronephrosis  No shadowing calculi  No perinephric fluid collections  Left kidney Normal echogenicity and contour  No mass is identified  Small renal cysts, largest measuring 1 6 cm  No hydronephrosis  No shadowing calculi  No perinephric fluid collections  URETERS: Nonvisualized  BLADDER: Underdistended  No focal mass  Bilateral ureteral jets detected  Impression: Small bilateral renal cysts  Otherwise, unremarkable examination  No hydronephrosis   Workstation performed: [id number]       Current Facility-Administered Medications   Medication Dose Route Frequency    acetaminophen (TYLENOL) tablet 650 mg  650 mg Oral Q6H PRN    apixaban (ELIQUIS) tablet 2 5 mg  2 5 mg Oral BID    atorvastatin (LIPITOR) tablet 80 mg  80 mg Oral Daily    calcitriol (ROCALTROL) capsule 0 25 mcg  0 25 mcg Oral Daily    carvedilol (COREG) tablet 6 25 mg  6 25 mg Oral BID With Meals    cholecalciferol (VITAMIN D3) tablet 5,000 Units  5,000 Units Oral Once per day on Mon Wed Fri    famotidine (PEPCID) tablet 20 mg  20 mg Oral BID    insulin lispro (HumaLOG) 100 units/mL subcutaneous injection 1-5 Units  1-5 Units Subcutaneous TID AC    labetalol (NORMODYNE) injection 10 mg  10 mg Intravenous Q6H PRN    ondansetron (ZOFRAN) injection 4 mg  4 mg Intravenous Q6H PRN    PARoxetine (PAXIL) tablet 40 mg  40 mg Oral Daily     Medications Discontinued During This Encounter   Medication Reason    aspirin (ECOTRIN LOW STRENGTH) 81 mg EC tablet     anastrozole (ARIMIDEX) 1 mg tablet     lisinopril (ZESTRIL) tablet 10 mg     labetalol (NORMODYNE) injection 10 mg     atenolol (TENORMIN) 100 mg tablet     amLODIPine (NORVASC) 10 mg tablet     amLODIPine (NORVASC) tablet 5 mg     carvedilol (COREG) tablet 3 125 mg     PARoxetine (PAXIL) tablet 40 mg     PARoxetine (PAXIL) tablet 40 mg     sodium chloride 0 9 % infusion     carvedilol (COREG) tablet 3 125 mg        Yoon Brewer MD      This progress note was produced in part using a dictation device which may document imprecise wording from author's original intent

## 2022-05-15 NOTE — PROGRESS NOTES
[facility] 43  Progress Note Birthaline Davenport 1945, 76 y o  female MRN: [id number]  Unit/Bed#: -01 Encounter: [id number]  Primary Care Provider: Benny Lund DO   Date and time admitted to hospital: 5/11/2022  1:44 PM    * Syncope  Assessment & Plan  · Due to orthostatic hypotension   · Patient daughter reports patient was recently diagnosed with small-cell lung cancer, currently seeking hospice  · Has had poor p o  Intake over the past several days, upon getting up she reportedly had a syncopal spell  · Daughter reports twitching, but patient awoken briefly after with no post-ictal symptoms  · CT head negative, Chest x-ray showing lung mass  · In ED, patient's initial blood pressure was 275B systolic, upon sitting 856J  · According to the daughter, patient is only on carvedilol at home  · Cardiology consulted - suggested orthostatic hypotension, no further cardiac workup recommended  · Troponin negative, EKG showed no acute ST changes   · PT and OT recommended rehab  · IV labetalol for SBP >180  · Patient was on carvedilol at home, nephrology on board - increased carvedilol to 6 25 b i d  due to resting hypertension  · Discontinued IV fluid  · Ordered compression stockings  · Patient is medically clear, awaiting rehab  However, today the daughter is not sure whether she really wants the patient to be in rehab  The daughter would like to discuss with hospice team tomorrow  Humeral fracture  Assessment & Plan  · Patient had a fall at the night of 05/12/2022, with nondisplaced fracture  · Orthopedic consult - recommended conservative management  · PT and OT recommended rehab  · Due to other clinical comorbidities, the daughter is not sure whether she really wants the patient to be in the rehab at this time    The daughter will discuss with hospice team     Anemia  Assessment & Plan  · Most likely dilutional  · Hemoglobin slowly downtrending  · Patient had recent humeral fracture, no evidence of hematoma  · Follow CBC    Lung mass  Assessment & Plan  · Complain of dyspnea this morning  · Discontinue IV fluid  · Will order chest x-ray  · Chest x-ray on admission shows left paramediastinal mass, left upper lobe mass and left pleural effusion  · Patient follows Oncology at [facility]  · Unable to review documentation taking care everywhere  · Per daughter, reports diagnosis of small-cell lung cancer, recently had bed port removed declining further chemotherapy  · Initially daughter said she does not want to pursue hospice until she sees the oncologist, however due to rapid decline in her  clinical status, daughter would like to discuss with hospice while she is in the hospital   · Case mgmt consulted for further review    Type 2 diabetes mellitus without complication, without long-term current use of insulin [facility])  Assessment & Plan  Lab Results   Component Value Date    HGBA1C 7 6 (H) 05/12/2022     · Hold home p o  Diabetic medication  · Hb A1c 7 7, sliding scale and Accu-Cheks    Stage 3 chronic kidney disease [facility])  Assessment & Plan  Lab Results   Component Value Date    EGFR 50 05/15/2022    EGFR 34 05/14/2022    EGFR 47 05/12/2022    CREATININE 1 06 05/15/2022    CREATININE 1 44 (H) 05/14/2022    CREATININE 1 10 05/12/2022     · Creatinine back to baseline  · Discontinue IV fluid   · Nephrology on board    DVT (deep venous thrombosis) ([facility] [address] )  Assessment & Plan  · History of DVT, on Eliquis 2 5 b i d , will continue    History of breast cancer  Assessment & Plan  · Patient reports history of breast cancer status post resection and chemotherapy  · Currently in surveillance, follows Oncology at [facility]      VTE Pharmacologic Prophylaxis:   Pharmacologic: Apixaban (Eliquis)  Mechanical VTE Prophylaxis in Place: Yes    Patient Centered Rounds: I have performed bedside rounds with nursing staff today      Discussions with Specialists or Other Care Team Provider:  Discussed with Case Management, nurse    Education and Discussions with Family / Patient:  Discussed with patient, daughter at bedside    Time Spent for Care: 45 minutes  More than 50% of total time spent on counseling and coordination of care as described above  Current Length of Stay: 3 day(s)    Current Patient Status: Inpatient   Certification Statement: The patient will continue to require additional inpatient hospital stay due to Pending placement    Discharge Plan / Estimated Discharge Date:  Pending placement      Code Status: Level 3 - DNAR and DNI      Subjective:   Patient was seen and examined at bedside  The patient has shortness of breath and feeling sick this morning  She denies any pain, chest pain, palpitation  Objective:     Vitals:   Temp (24hrs), Av 4 °F (36 9 °C), Min:98 3 °F (36 8 °C), Max:98 4 °F (36 9 °C)    Temp:  [98 3 °F (36 8 °C)-98 4 °F (36 9 °C)] 98 3 °F (36 8 °C)  HR:  [] 112  Resp:  [18-22] 22  BP: (120-224)/() 184/92  SpO2:  [94 %-96 %] 94 %  Body mass index is 28 55 kg/m²  Input and Output Summary (last 24 hours):        Intake/Output Summary (Last 24 hours) at 5/15/2022 1212  Last data filed at 5/15/2022 1009  Gross per 24 hour   Intake 2631 67 ml   Output 400 ml   Net 2231 67 ml       Physical Exam:     Physical Exam  General: breathing on 2 L oxygen via NC, no acute distress  HEENT: NC/AT, PERRL, EOM - normal  Neck: Supple  Pulm/Chest: Normal chest wall expansion, decreased breath sounds on both side, no wheezing/rhonchi or crackles appreciated  CVS: RRR, normal S1&S2, no murmur appreciated, capillary refill <2s  Abd: soft, non tender, non distended, bowel sounds +  MSK: move all 4 extremities spontaneously, left her fracture in sling, venous static changes on left lower extremity  Skin: warm  CNS: no acute focal neuro deficit      Additional Data:     Labs:    Results from last 7 days   Lab Units 05/15/22  0447   WBC Thousand/uL 8 60   HEMOGLOBIN g/dL 8 1*   HEMATOCRIT % 26 6* PLATELETS Thousands/uL 219   NEUTROS PCT % 77*   LYMPHS PCT % 13*   MONOS PCT % 8   EOS PCT % 1     Results from last 7 days   Lab Units 05/15/22  0447   POTASSIUM mmol/L 4 4   CHLORIDE mmol/L 103   CO2 mmol/L 27   BUN mg/dL 17   CREATININE mg/dL 1 06   CALCIUM mg/dL 8 6   ALK PHOS U/L 89   ALT U/L 6*   AST U/L 10*           * I Have Reviewed All Lab Data Listed Above  * Additional Pertinent Lab Tests Reviewed: [facility] 64 Admission Reviewed    Imaging:    Imaging Reports Reviewed Today Include: None  Imaging Personally Reviewed by Myself Includes:  None      Recent Cultures (last 7 days):           Last 24 Hours Medication List:   Current Facility-Administered Medications   Medication Dose Route Frequency Provider Last Rate    acetaminophen  650 mg Oral Q6H PRN Josep Nogueira MD      apixaban  2 5 mg Oral BID Josep Nogueira MD      atorvastatin  80 mg Oral Daily Josep Nogueira MD      calcitriol  0 25 mcg Oral Daily Josep Nogueira MD      carvedilol  6 25 mg Oral BID With Meals Kelly Osman MD      cholecalciferol  5,000 Units Oral Once per day on Mon Wed Fri Josep Nogueira MD      famotidine  20 mg Oral BID Josep Nogueira MD      insulin lispro  1-5 Units Subcutaneous TID [facility] Josep Nogueira MD      labetalol  10 mg Intravenous Q6H PRN Rneny Varner MD      ondansetron  4 mg Intravenous Q6H PRN Josep Nogueira MD      PARoxetine  40 mg Oral Daily Renny Varner MD          Today, Patient Was Seen By: Renny Varner MD    ** Please Note: Dragon 360 Dictation voice to text software may have been used in the creation of this document   **

## 2022-05-15 NOTE — ASSESSMENT & PLAN NOTE
Lab Results   Component Value Date    EGFR 50 05/15/2022    EGFR 34 05/14/2022    EGFR 47 05/12/2022    CREATININE 1 06 05/15/2022    CREATININE 1 44 (H) 05/14/2022    CREATININE 1 10 05/12/2022     · Creatinine back to baseline  · Discontinue IV fluid   · Nephrology on board

## 2022-05-15 NOTE — ASSESSMENT & PLAN NOTE
· Complain of dyspnea this morning  · Discontinue IV fluid  · Will order chest x-ray  · Chest x-ray on admission shows left paramediastinal mass, left upper lobe mass and left pleural effusion  · Patient follows Oncology at [facility]  · Unable to review documentation taking care everywhere  · Per daughter, reports diagnosis of small-cell lung cancer, recently had bed port removed declining further chemotherapy  · Initially daughter said she does not want to pursue hospice until she sees the oncologist, however due to rapid decline in her  clinical status, daughter would like to discuss with hospice while she is in the hospital   · Case mgmt consulted for further review

## 2022-05-15 NOTE — PLAN OF CARE
Problem: Potential for Falls  Goal: Patient will remain free of falls  Description: INTERVENTIONS:  - Educate patient/family on patient safety including physical limitations  - Instruct patient to call for assistance with activity   - Consult OT/PT to assist with strengthening/mobility   - Keep Call bell within reach  - Keep bed low and locked with side rails adjusted as appropriate  - Keep care items and personal belongings within reach  - Initiate and maintain comfort rounds  - Make Fall Risk Sign visible to staff  - Offer Toileting every 2 Hours, in advance of need  - Initiate/Maintain bed alarm  - Apply yellow socks and bracelet for high fall risk patients  - Consider moving patient to room near nurses station  Outcome: Progressing     Problem: MOBILITY - ADULT  Goal: Maintain or return to baseline ADL function  Description: INTERVENTIONS:  -  Assess patient's ability to carry out ADLs; assess patient's baseline for ADL function and identify physical deficits which impact ability to perform ADLs (bathing, care of mouth/teeth, toileting, grooming, dressing, etc )  - Assess/evaluate cause of self-care deficits   - Assess range of motion  - Assess patient's mobility; develop plan if impaired  - Assess patient's need for assistive devices and provide as appropriate  - Encourage maximum independence but intervene and supervise when necessary  - Involve family in performance of ADLs  - Assess for home care needs following discharge   - Consider OT consult to assist with ADL evaluation and planning for discharge  - Provide patient education as appropriate  Outcome: Progressing  Goal: Maintains/Returns to pre admission functional level  Description: INTERVENTIONS:  - Perform BMAT or MOVE assessment daily    - Set and communicate daily mobility goal to care team and patient/family/caregiver  - Collaborate with rehabilitation services on mobility goals if consulted  - Perform Range of Motion 3 times a day    - Reposition patient every 2 hours    - Dangle patient 3 times a day  - Stand patient 3 times a day  - Ambulate patient 3 times a day  - Out of bed to chair 3 times a day   - Out of bed for meals 3 times a day  - Out of bed for toileting  - Record patient progress and toleration of activity level   Outcome: Progressing     Problem: PAIN - ADULT  Goal: Verbalizes/displays adequate comfort level or baseline comfort level  Description: Interventions:  - Encourage patient to monitor pain and request assistance  - Assess pain using appropriate pain scale  - Administer analgesics based on type and severity of pain and evaluate response  - Implement non-pharmacological measures as appropriate and evaluate response  - Consider cultural and social influences on pain and pain management  - Notify physician/advanced practitioner if interventions unsuccessful or patient reports new pain  Outcome: Progressing     Problem: INFECTION - ADULT  Goal: Absence or prevention of progression during hospitalization  Description: INTERVENTIONS:  - Assess and monitor for signs and symptoms of infection  - Monitor lab/diagnostic results  - Monitor all insertion sites, i e  indwelling lines, tubes, and drains  - Monitor endotracheal if appropriate and nasal secretions for changes in amount and color  - [place] appropriate cooling/warming therapies per order  - Administer medications as ordered  - Instruct and encourage patient and family to use good hand hygiene technique  - Identify and instruct in appropriate isolation precautions for identified infection/condition  Outcome: Progressing  Goal: Absence of fever/infection during neutropenic period  Description: INTERVENTIONS:  - Monitor WBC    Outcome: Progressing     Problem: SAFETY ADULT  Goal: Patient will remain free of falls  Description: INTERVENTIONS:  - Educate patient/family on patient safety including physical limitations  - Instruct patient to call for assistance with activity   - Consult OT/PT to assist with strengthening/mobility   - Keep Call bell within reach  - Keep bed low and locked with side rails adjusted as appropriate  - Keep care items and personal belongings within reach  - Initiate and maintain comfort rounds  - Make Fall Risk Sign visible to staff  - Offer Toileting every 2 Hours, in advance of need  - Initiate/Maintain bed alarm  - Apply yellow socks and bracelet for high fall risk patients  - Consider moving patient to room near nurses station  Outcome: Progressing  Goal: Maintain or return to baseline ADL function  Description: INTERVENTIONS:  -  Assess patient's ability to carry out ADLs; assess patient's baseline for ADL function and identify physical deficits which impact ability to perform ADLs (bathing, care of mouth/teeth, toileting, grooming, dressing, etc )  - Assess/evaluate cause of self-care deficits   - Assess range of motion  - Assess patient's mobility; develop plan if impaired  - Assess patient's need for assistive devices and provide as appropriate  - Encourage maximum independence but intervene and supervise when necessary  - Involve family in performance of ADLs  - Assess for home care needs following discharge   - Consider OT consult to assist with ADL evaluation and planning for discharge  - Provide patient education as appropriate  Outcome: Progressing  Goal: Maintains/Returns to pre admission functional level  Description: INTERVENTIONS:  - Perform BMAT or MOVE assessment daily    - Set and communicate daily mobility goal to care team and patient/family/caregiver  - Collaborate with rehabilitation services on mobility goals if consulted  - Perform Range of Motion 3 times a day  - Reposition patient every 2 hours    - Dangle patient 3 times a day  - Stand patient 3 times a day  - Ambulate patient 3 times a day  - Out of bed to chair 3 times a day   - Out of bed for meals 3 times a day  - Out of bed for toileting  - Record patient progress and toleration of activity level   Outcome: Progressing     Problem: DISCHARGE PLANNING  Goal: Discharge to home or other facility with appropriate resources  Description: INTERVENTIONS:  - Identify barriers to discharge w/patient and caregiver  - Arrange for needed discharge resources and transportation as appropriate  - Identify discharge learning needs (meds, wound care, etc )  - Arrange for interpretive services to assist at discharge as needed  - Refer to Case Management Department for coordinating discharge planning if the patient needs post-hospital services based on physician/advanced practitioner order or complex needs related to functional status, cognitive ability, or social support system  Outcome: Progressing     Problem: Knowledge Deficit  Goal: Patient/family/caregiver demonstrates understanding of disease process, treatment plan, medications, and discharge instructions  Description: Complete learning assessment and assess knowledge base    Interventions:  - Provide teaching at level of understanding  - Provide teaching via preferred learning methods  Outcome: Progressing     Problem: Prexisting or High Potential for Compromised Skin Integrity  Goal: Skin integrity is maintained or improved  Description: INTERVENTIONS:  - Identify patients at risk for skin breakdown  - Assess and monitor skin integrity  - Assess and monitor nutrition and hydration status  - Monitor labs   - Assess for incontinence   - Turn and reposition patient  - Assist with mobility/ambulation  - Relieve pressure over bony prominences  - Avoid friction and shearing  - Provide appropriate hygiene as needed including keeping skin clean and dry  - Evaluate need for skin moisturizer/barrier cream  - Collaborate with interdisciplinary team   - Patient/family teaching  - Consider wound care consult   Outcome: Progressing

## 2022-05-15 NOTE — ASSESSMENT & PLAN NOTE
· Due to orthostatic hypotension   · Patient daughter reports patient was recently diagnosed with small-cell lung cancer, currently seeking hospice  · Has had poor p o  Intake over the past several days, upon getting up she reportedly had a syncopal spell  · Daughter reports twitching, but patient awoken briefly after with no post-ictal symptoms  · CT head negative, Chest x-ray showing lung mass  · In ED, patient's initial blood pressure was 034H systolic, upon sitting 700O  · According to the daughter, patient is only on carvedilol at home  · Cardiology consulted - suggested orthostatic hypotension, no further cardiac workup recommended  · Troponin negative, EKG showed no acute ST changes   · PT and OT recommended rehab  · IV labetalol for SBP >180  · Patient was on carvedilol at home, nephrology on board - increased carvedilol to 6 25 b i d  due to resting hypertension  · Discontinued IV fluid  · Ordered compression stockings  · Patient is medically clear, awaiting rehab  However, today the daughter is not sure whether she really wants the patient to be in rehab  The daughter would like to discuss with hospice team tomorrow

## 2022-05-15 NOTE — PROGRESS NOTES
Spoke with Dr Christelle Stone regarding patient's elevated blood pressure after receiving PRN dose of IV Labetalol    Dr Christelle Stone will speak with Nephrology before ordering additional medication

## 2022-05-16 NOTE — PLAN OF CARE
Problem: Potential for Falls  Goal: Patient will remain free of falls  Description: INTERVENTIONS:  - Educate patient/family on patient safety including physical limitations  - Instruct patient to call for assistance with activity   - Consult OT/PT to assist with strengthening/mobility   - Keep Call bell within reach  - Keep bed low and locked with side rails adjusted as appropriate  - Keep care items and personal belongings within reach  - Initiate and maintain comfort rounds  - Make Fall Risk Sign visible to staff  - Offer Toileting every  Hours, in advance of need  - Initiate/Maintain alarm  - Obtain necessary fall risk management equipment  - Apply yellow socks and bracelet for high fall risk patients  - Consider moving patient to room near nurses station  Outcome: Progressing     Problem: MOBILITY - ADULT  Goal: Maintain or return to baseline ADL function  Description: INTERVENTIONS:  -  Assess patient's ability to carry out ADLs; assess patient's baseline for ADL function and identify physical deficits which impact ability to perform ADLs (bathing, care of mouth/teeth, toileting, grooming, dressing, etc )  - Assess/evaluate cause of self-care deficits   - Assess range of motion  - Assess patient's mobility; develop plan if impaired  - Assess patient's need for assistive devices and provide as appropriate  - Encourage maximum independence but intervene and supervise when necessary  - Involve family in performance of ADLs  - Assess for home care needs following discharge   - Consider OT consult to assist with ADL evaluation and planning for discharge  - Provide patient education as appropriate  Outcome: Progressing  Goal: Maintains/Returns to pre admission functional level  Description: INTERVENTIONS:  - Perform BMAT or MOVE assessment daily    - Set and communicate daily mobility goal to care team and patient/family/caregiver     - Collaborate with rehabilitation services on mobility goals if consulted  - Perform Range of Motion  times a day  - Reposition patient every  hours    - Dangle patient  times a day  - Stand patient  times a day  - Ambulate patient  times a day  - Out of bed to chair  times a day   - Out of bed for meals  times a day  - Out of bed for toileting  - Record patient progress and toleration of activity level   Outcome: Progressing

## 2022-05-16 NOTE — NURSING NOTE
Patient complainging of SOB  Appears anxious  Increased from 2 to 3 L, n/c  Patient boosted up in bed  Saturatin increased to 80's from [de-identified]  Wheeze present without much movement  RT contacted and in to see patient  Patient told RT that she choked on a piece of bread at supper  RN aware, physician aware  Swallow eval ordered  IV ativan administered for anxiety

## 2022-05-16 NOTE — PROGRESS NOTES
[age][facility] 43  Progress Note Olga Mclean 1945, 76 y o  female MRN: [id number]  Unit/Bed#: -01 Encounter: [id number]  Primary Care Provider: Brendan Palacios DO   Date and time admitted to hospital: 5/11/2022  1:44 PM    * Syncope  Assessment & Plan  · Due to orthostatic hypotension   · Patient daughter reports patient was recently diagnosed with small-cell lung cancer, currently seeking hospice  · Has had poor p o  Intake over the past several days, upon getting up she reportedly had a syncopal spell  · Daughter reports twitching, but patient awoken briefly after with no post-ictal symptoms  · CT head negative, Chest x-ray showing lung mass  · In ED, patient's initial blood pressure was 211G systolic, upon sitting 393D  · According to the daughter, patient is only on carvedilol at home  · Cardiology consulted - suggested orthostatic hypotension, no further cardiac workup recommended  · Troponin negative, EKG showed no acute ST changes   · PT and OT recommended rehab  · IV labetalol for SBP >180  · Patient was on carvedilol at home, nephrology on board - increased carvedilol to 6 25 b i d  due to resting hypertension  · Discontinued IV fluid  · Ordered compression stockings  · Patient is medically clear, awaiting rehab  However, today the daughter is not sure whether she really wants the patient to be in rehab  The daughter would like to discuss with hospice team tomorrow    · Hospice liaison consulted      Lung mass  Assessment & Plan  · Complain of dyspnea this morning  · Discontinue IV fluid  · Will order chest x-ray  · Chest x-ray on admission shows left paramediastinal mass, left upper lobe mass and left pleural effusion  · Patient follows Oncology at [address] [place] [address]  · Unable to review documentation taking care everywhere  · Per daughter, reports diagnosis of small-cell lung cancer, recently had bed port removed declining further chemotherapy  · Initially daughter said she does not want to pursue hospice until she sees the oncologist, however due to rapid decline in her  clinical status, daughter would like to discuss with hospice while she is in the hospital   · Ongoing goals of care discussions  · Hospice liaison has been consulted    Humeral fracture  Assessment & Plan  · Patient had a fall at the night of 05/12/2022, with nondisplaced fracture  · Orthopedic consult - recommended conservative management  · PT and OT recommended rehab  · Due to other clinical comorbidities, the daughter is not sure whether she really wants the patient to be in the rehab at this time  The daughter will discuss with hospice team     Anemia  Assessment & Plan  · Most likely dilutional  · Hemoglobin slowly downtrending  · Patient had recent humeral fracture, no evidence of hematoma  · Follow CBC    DVT (deep venous thrombosis) ([facility])  Assessment & Plan  · History of DVT, on Eliquis 2 5 b i d , will continue    Stage 3 chronic kidney disease [facility])  Assessment & Plan  Lab Results   Component Value Date    EGFR 53 05/16/2022    EGFR 50 05/15/2022    EGFR 34 05/14/2022    CREATININE 1 00 05/16/2022    CREATININE 1 06 05/15/2022    CREATININE 1 44 (H) 05/14/2022     · Creatinine back to baseline  · Discontinue IV fluid   · Nephrology on board    Type 2 diabetes mellitus without complication, without long-term current use of insulin ([address] )  Assessment & Plan  Lab Results   Component Value Date    HGBA1C 7 6 (H) 05/12/2022     · Hold home p o   Diabetic medication  · Hb A1c 7 7, sliding scale and Accu-Cheks    History of breast cancer  Assessment & Plan  · Patient reports history of breast cancer status post resection and chemotherapy  · Currently in surveillance, [place] [facility]  · Ongoing goals of care discussions being held with family      VTE Pharmacologic Prophylaxis:  Eliquis    Patient Centered Rounds:  Patient care rounds were performed with nursing    Discussions with Specialists or Other Care Team Provider:  Hospice liaison, Case Management    Education and Discussions with Family / Patient:  Spoke with patient's daughter    Time Spent for Care: 30  More than 50% of total time spent on counseling and coordination of care as described above  Current Length of Stay: 4 day(s)    Current Patient Status: Inpatient     Certification Statement: The patient will continue to require additional inpatient hospital stay due to    Discharge Plan:  Home hospice    Code Status: Level 3 - DNAR and DNI      Subjective:   Patient seen and examined at bedside  No acute events overnight  Daughter states she is interested in hospice at this time  Case Management consulted  Objective:     Vitals:   Temp (24hrs), Av 2 °F (36 8 °C), Min:97 2 °F (36 2 °C), Max:98 6 °F (37 °C)    Temp:  [97 2 °F (36 2 °C)-98 6 °F (37 °C)] 98 5 °F (36 9 °C)  HR:  [] 99  Resp:  [18-22] 18  BP: (149-206)/() 160/76  SpO2:  [94 %-97 %] 95 %  Body mass index is 28 16 kg/m²  Input and Output Summary (last 24 hours):     No intake or output data in the 24 hours ending 22 1224    Physical Exam:     Physical Exam  Vitals and nursing note reviewed  Constitutional:       General: She is not in acute distress  Appearance: She is well-developed  HENT:      Head: Normocephalic and atraumatic  Eyes:      Conjunctiva/sclera: Conjunctivae normal    Cardiovascular:      Rate and Rhythm: Normal rate and regular rhythm  Heart sounds: No murmur heard  Pulmonary:      Effort: Pulmonary effort is normal  No respiratory distress  Breath sounds: Normal breath sounds  Abdominal:      Palpations: Abdomen is soft  Tenderness: There is no abdominal tenderness  Musculoskeletal:      Cervical back: Neck supple  Skin:     General: Skin is warm and dry  Neurological:      Mental Status: She is alert  Additional Data:     Labs:  I have reviewed pertinent results     Results from last 7 days   Lab Units 05/16/22  0440   WBC Thousand/uL 8 38   HEMOGLOBIN g/dL 8 0*   HEMATOCRIT % 26 9*   PLATELETS Thousands/uL 239   NEUTROS PCT % 76*   LYMPHS PCT % 14   MONOS PCT % 9   EOS PCT % 1     Results from last 7 days   Lab Units 05/16/22  0440 05/15/22  0447   SODIUM mmol/L 135 135   POTASSIUM mmol/L 4 6 4 4   CHLORIDE mmol/L 101 103   CO2 mmol/L 29 27   BUN mg/dL 16 17   CREATININE mg/dL 1 00 1 06   ANION GAP mmol/L 5 5   CALCIUM mg/dL 8 4 8 6   ALBUMIN g/dL  --  3 0*   TOTAL BILIRUBIN mg/dL  --  0 59   ALK PHOS U/L  --  89   ALT U/L  --  6*   AST U/L  --  10*   GLUCOSE RANDOM mg/dL 172* 190*         Results from last 7 days   Lab Units 05/16/22  1120 05/16/22  0550 05/15/22  2121 05/15/22  2020 05/15/22  1509 05/15/22  1103 05/15/22  0547 05/14/22 2120 05/14/22  1532 05/14/22  1149 05/14/22  0542 05/13/22  1526   POC GLUCOSE mg/dl 195* 148* 207* 173* 166* 191* 197* 256* 214* 252* 271* 232*     Results from last 7 days   Lab Units 05/12/22  0517   HEMOGLOBIN A1C % 7 6*             Imaging: I have reviewed pertinent imaging       Recent Cultures (last 7 days):           Last 24 Hours Medication List:   Current Facility-Administered Medications   Medication Dose Route Frequency Provider Last Rate    acetaminophen  650 mg Oral Q6H PRN Victoria Downing MD      apixaban  2 5 mg Oral BID Victoria Downing MD      atorvastatin  80 mg Oral Daily Victoria Downing MD      calcitriol  0 25 mcg Oral Daily Victoria Downing MD      carvedilol  6 25 mg Oral BID With Meals Wesley Tovar MD      cholecalciferol  5,000 Units Oral Once per day on Mon Wed Fri Victoria Downing MD      famotidine  20 mg Oral BID Victoria Downing MD      hydrALAZINE  5 mg Intravenous Q6H PRN Urban Gore, DO      insulin lispro  1-5 Units Subcutaneous TID AC Victoria Downing MD      labetalol  10 mg Intravenous Q6H PRN Beba Jacob MD      ondansetron  4 mg Intravenous Q6H PRN Kimberly Aguila MD      PARoxetine  40 mg Oral Daily Beba Jacob MD Today, Patient Was Seen By: Danny Bowman DO    ** Please Note: Dictation voice to text software may have been used in the creation of this document   **

## 2022-05-16 NOTE — OCCUPATIONAL THERAPY NOTE
05/16/22 0933   OT Last Visit   OT Visit Date 05/16/22   Note Type   Note Type Treatment   Restrictions/Precautions   Weight Bearing Precautions Per Order Yes   LUE Weight Bearing Per Order NWB   Braces or Orthoses Sling   Other Precautions O2;Fall Risk;Telemetry; Chair Alarm; Bed Alarm   Pain Assessment   Pain Assessment Tool 0-10   Pain Score No Pain   Bed Mobility   Supine to Sit 5  Supervision   Additional items Assist x 1;Bedrails; Increased time required;Verbal cues   Additional Comments sat EOB unsupported to complete TE with no LOB noted  Transfers   Sit to Stand   (CGA)   Additional items Increased time required;Verbal cues; Assist x 2   Stand to Sit   (CGA)   Additional items Increased time required;Verbal cues; Assist x 2   Stand pivot   (cga)   Additional items Assist x 2; Increased time required;Verbal cues   Functional Mobility   Functional Mobility   (CGA)   Additional Comments Pt ambulated 5 feet with CGA x 2  Additional items   (No AD, hand held assist)   Therapeutic Exercise - ROM   UE-ROM Yes   ROM- Right Upper Extremities   R Shoulder AROM; Flexion; Extension   R Elbow AROM;Elbow flexion;Elbow extension   R Wrist AROM  (wrist rotation)   R Weight/Reps/Sets 15 reps with no weight   ROM - Left Upper Extremities    L Weight/Reps/Sets No TE to L UE   Cognition   Overall Cognitive Status WFL   Arousal/Participation Alert; Responsive   Attention Attends with cues to redirect   Orientation Level Oriented X4   Memory Decreased recall of precautions;Decreased recall of biographical information   Following Commands Follows one step commands without difficulty   Comments Pt agreeable to OT treatment     Activity Tolerance   Activity Tolerance Patient limited by fatigue;Treatment limited secondary to medical complications (Comment)  (Blood pressure drops when standing)   Assessment   Assessment Patient participated in Skilled OT session this date with interventions consisting of functional transfer training, Energy Conservation techniques, therapeutic exercise to: increase functional use of BUEs, increase BUE muscle strength  and increase dynamic sit/ stand balance during functional activity    Patient agreeable to OT treatment session, upon arrival patient was found supine in bed  Patient transfers supine to sit EOB with supervision x 1  Patient then sat EOB unsupported x 15 min to complete UB TE as detailed in flow sheet  Following TE, patient transfers sit to stand and ambulates 5 feet with CGA x 2  Patient then sat for a brief rest period  Patient then stood again and completed static standing and then weight shifts side to side x 2 mins  Patient then reports significant feeling of wooziness and sat back down in recliner  Blood pressures taken while once seated EOB (172/95) and again when standing (dropped down to 89/69)  Session then ended with patient seated OOB to recliner, all  Needs met, and call bell within reach  In comparison to previous session, patient with improvements in functional transfers/mobility, UB strength, and endurance   Patient requiring frequent re direction, verbal cues for safety, verbal cues for correct technique, verbal cues for pacing thru activity steps and frequent rest periods  Patient performance  demonstrated good carryover of learned techniques and strategies to facilitate safety during functional tasks  Patient continues to be functioning below baseline level, occupational performance remains limited secondary to factors listed above and increased risk for falls and injury  From OT standpoint, recommendation at time of d/c would be Home OT  Patient to benefit from continued Occupational Therapy treatment while in the hospital to address deficits as defined above and maximize level of functional independence with ADLs and functional mobility in order to return to PLOF  Plan   Treatment Interventions Functional transfer training;UE strengthening/ROM; Energy conservation   Goal Expiration Date 05/22/22   OT Treatment Day 1   OT Frequency 3-5x/wk   Recommendation   OT Discharge Recommendation Home with home health rehabilitation   OT - OK to Discharge Yes  (when medically cleared)   AM-PAC Daily Activity Inpatient   Lower Body Dressing 3   Bathing 3   Toileting 3   Upper Body Dressing 3   Grooming 4   Eating 4   Daily Activity Raw Score 20   Daily Activity Standardized Score (Calc for Raw Score >=11) 42 03   AM-PAC Applied Cognition Inpatient   Following a Speech/Presentation 4   Understanding Ordinary Conversation 4   Taking Medications 2   Remembering Where Things Are Placed or Put Away 3   Remembering List of 4-5 Errands 3   Taking Care of Complicated Tasks 3   Applied Cognition Raw Score 19   Applied Cognition Standardized Score 39 77   Emaline ALMA DELIA Gauthier

## 2022-05-16 NOTE — PLAN OF CARE
Problem: OCCUPATIONAL THERAPY ADULT  Goal: Performs self-care activities at highest level of function for planned discharge setting  See evaluation for individualized goals  Description: Treatment Interventions: ADL retraining, Functional transfer training, UE strengthening/ROM, Endurance training, Patient/family training, Equipment evaluation/education, Compensatory technique education, Activityengagement  Equipment Recommended: Bedside commode       See flowsheet documentation for full assessment, interventions and recommendations  Outcome: Progressing  Note: Limitation: Decreased ADL status, Decreased UE strength, Decreased endurance, Decreased high-level ADLs, Decreased self-care trans  Prognosis: Good  Assessment: Patient participated in Skilled OT session this date with interventions consisting of functional transfer training, Energy Conservation techniques, therapeutic exercise to: increase functional use of BUEs, increase BUE muscle strength  and increase dynamic sit/ stand balance during functional activity    Patient agreeable to OT treatment session, upon arrival patient was found supine in bed  Patient transfers supine to sit EOB with supervision x 1  Patient then sat EOB unsupported x 15 min to complete UB TE as detailed in flow sheet  Following TE, patient transfers sit to stand and ambulates 5 feet with CGA x 2  Patient then sat for a brief rest period  Patient then stood again and completed static standing and then weight shifts side to side x 2 mins  Patient then reports significant feeling of wooziness and sat back down in recliner  Blood pressures taken while once seated EOB (172/95) and again when standing (dropped down to 89/69)  Session then ended with patient seated OOB to recliner, all  Needs met, and call bell within reach  In comparison to previous session, patient with improvements in functional transfers/mobility, UB strength, and endurance    Patient requiring frequent re direction, verbal cues for safety, verbal cues for correct technique, verbal cues for pacing thru activity steps and frequent rest periods  Patient performance  demonstrated good carryover of learned techniques and strategies to facilitate safety during functional tasks  Patient continues to be functioning below baseline level, occupational performance remains limited secondary to factors listed above and increased risk for falls and injury  From OT standpoint, recommendation at time of d/c would be Home OT  Patient to benefit from continued Occupational Therapy treatment while in the hospital to address deficits as defined above and maximize level of functional independence with ADLs and functional mobility in order to return to OF       OT Discharge Recommendation: Home with home health rehabilitation  OT - OK to Discharge: Yes (when medically cleared)

## 2022-05-16 NOTE — ASSESSMENT & PLAN NOTE
· Complain of dyspnea this morning  · Discontinue IV fluid  · Will order chest x-ray  · Chest x-ray on admission shows left paramediastinal mass, left upper lobe mass and left pleural effusion  · Patient follows Oncology at [facility]  · Unable to review documentation taking care everywhere  · Per daughter, reports diagnosis of small-cell lung cancer, recently had bed port removed declining further chemotherapy  · Initially daughter said she does not want to pursue hospice until she sees the oncologist, however due to rapid decline in her  clinical status, daughter would like to discuss with hospice while she is in the hospital   · Ongoing goals of care discussions  · Hospice liaison has been consulted

## 2022-05-16 NOTE — PLAN OF CARE
Problem: PHYSICAL THERAPY ADULT  Goal: Performs mobility at highest level of function for planned discharge setting  See evaluation for individualized goals  Description: Treatment/Interventions: Functional transfer training, LE strengthening/ROM, Elevations, Therapeutic exercise, Endurance training, Patient/family training, Equipment eval/education, Bed mobility, Gait training, Spoke to nursing, Spoke to MD  Equipment Recommended:  (pt would benefit from RW use)       See flowsheet documentation for full assessment, interventions and recommendations  Outcome: Progressing  Note: Prognosis: Fair  Problem List: Decreased strength, Decreased endurance, Impaired balance, Decreased mobility, Impaired judgement  Assessment: Pt seen for PT treatment session this date with interventions consisting of bed mobility tasks, transfer training, gait training, seated TE, neuromuscular re-education of movement to improve dynamic sitting balance, standing tolerance, monitoring of orthostatic BP's, and education provided as needed for safety and direction to improve functional mobility and activity tolerance  Pt agreeable to PT treatment session upon arrival, pt found resting in bed  At end of session, pt left sitting OOB in recliner with chair alarm activated, SCD's applied, and all needs ion reach  In comparison to previous session, pt with improvements in ability to  transfer OOB into recliner   Continue to recommend STR at time of d/c in order to maximize pt's functional independence and safety w/ mobility  Pt continues to be functioning below baseline level  PT will continue to see pt while here in order to address the deficits listed above and provide interventions consistent w/ POC in effort to achieve STGs  Barriers to Discharge: None        PT Discharge Recommendation: Post acute rehabilitation services          See flowsheet documentation for full assessment

## 2022-05-16 NOTE — NURSING NOTE
Patient sitting in bed at present  PT/OT in to work with patient  Patient denies pain, offers no complaints  Call bell and belongings within reach, bed alarm on  Virtual sitter present on ipad

## 2022-05-16 NOTE — ASSESSMENT & PLAN NOTE
Lab Results   Component Value Date    EGFR 53 05/16/2022    EGFR 50 05/15/2022    EGFR 34 05/14/2022    CREATININE 1 00 05/16/2022    CREATININE 1 06 05/15/2022    CREATININE 1 44 (H) 05/14/2022     · Creatinine back to baseline  · Discontinue IV fluid   · Nephrology on board

## 2022-05-16 NOTE — UTILIZATION REVIEW
Continued Stay Review    Date: 5/16                          Current Patient Class: INpatient  Current Level of Care: Med/surg    HPI:77 y o  female initially admitted on 5/12     Assessment/Plan:  Awaiting rehab placement and further plans of care  Family may want hospice  Hospice consult placed  Lungs clear  Nephrology consult:  CXR shows new rib fracture on right right vs met vs callus  Check CT  Worsening pleural fluid on left  Creat has improved  Vital Signs:   Time Temp Pulse Resp BP MAP (mmHg) SpO2 Calculated FIO2 (%) - Nasal Cannula Nasal Cannula O2 Flow Rate (L/min) O2 Device Patient Position - Orthostatic VS   05/16/22 08:46:32 -- 99 -- 160/76 104 95 % 28 2 L/min Nasal cannula --   05/16/22 07:35:14 98 5 °F (36 9 °C) 95 18 203/106 Abnormal  138 96 % -- -- -- --   05/16/22 07:33:45 98 5 °F (36 9 °C) 97 -- 194/107 Abnormal  136 97 % -- -- -- --   05/16/22 0100 -- -- -- 149/86 -- -- -- -- -- Sitting   05/15/22 23:24:25 98 6 °F (37 °C) 98 18 196/110 Abnormal  139 94 % -- -- None (Room air) Lying   05/15/22 1915 -- -- -- -- -- -- 28 2 L/min Nasal cannula --   05/15/22 17:01:47 97 2 °F (36 2 °C) Abnormal  101 22 206/106 Abnormal  139 96 % 28 2 L/min Nasal cannula Lying   05/15/22 17:00:55 -- 101 -- 187/110 Abnormal  136 97 % -- -- -- --   05/15/22 10:03:17 -- -- -- 184/92 Abnormal  123 --             Pertinent Labs/Diagnostic Results:   5/16 CXR: 1   Increased opacification of the left hemithorax likely secondary to worsening layering pleural effusion with underlying atelectasis  Rounded density in the right midlung field   As there is an adjacent fracture of the posterior right 5th rib, this could represent callus formation  Rosanna Pronto lung metastasis is not excluded and follow-up chest CT is recommended  5/14 US KUB:Small bilateral renal cysts  Otherwise, unremarkable examination   No hydronephrosis  5/13 Xray left shoulder: Left humeral head and neck fracture as above     Results from last 7 days   Lab Units 05/11/22  1631   SARS-COV-2  Negative     Results from last 7 days   Lab Units 05/16/22  0440 05/15/22  0447 05/14/22  0447 05/12/22  0517 05/11/22  1354   WBC Thousand/uL 8 38 8 60 8 57 7 72 7 28   HEMOGLOBIN g/dL 8 0* 8 1* 8 3* 9 8* 10 1*   HEMATOCRIT % 26 9* 26 6* 27 1* 32 1* 32 6*   PLATELETS Thousands/uL 239 219 211 224 240   NEUTROS ABS Thousands/µL 6 34 6 72 5 97 5 39 4 76         Results from last 7 days   Lab Units 05/16/22  0440 05/15/22  0447 05/14/22  0447 05/12/22  0517 05/11/22  1354   SODIUM mmol/L 135 135 134* 138 134*   POTASSIUM mmol/L 4 6 4 4 4 3 4 2 4 0   CHLORIDE mmol/L 101 103 101 104 100   CO2 mmol/L 29 27 27 28 27   ANION GAP mmol/L 5 5 6 6 7   BUN mg/dL 16 17 19 18 20   CREATININE mg/dL 1 00 1 06 1 44* 1 10 1 39*   EGFR ml/min/1 73sq m 53 50 34 47 36   CALCIUM mg/dL 8 4 8 6 8 5 9 1 9 5     Results from last 7 days   Lab Units 05/15/22  0447 05/11/22  1354   AST U/L 10* 7*   ALT U/L 6* <3*   ALK PHOS U/L 89 109*   TOTAL PROTEIN g/dL 6 1* 6 7   ALBUMIN g/dL 3 0* 3 4*   TOTAL BILIRUBIN mg/dL 0 59 0 64     Results from last 7 days   Lab Units 05/16/22  1120 05/16/22  0550 05/15/22  2121 05/15/22  2020 05/15/22  1509 05/15/22  1103 05/15/22  0547 05/14/22 2120 05/14/22  1532 05/14/22  1149 05/14/22  0542 05/13/22  1526   POC GLUCOSE mg/dl 195* 148* 207* 173* 166* 191* 197* 256* 214* 252* 271* 232*     Results from last 7 days   Lab Units 05/16/22  0440 05/15/22  0447 05/14/22  0447 05/12/22  0517 05/11/22  1354   GLUCOSE RANDOM mg/dL 172* 190* 300* 149* 171*         Results from last 7 days   Lab Units 05/12/22  0517   HEMOGLOBIN A1C % 7 6*   EAG mg/dl 171       Results from last 7 days   Lab Units 05/11/22  1354   HS TNI 0HR ng/L 7       Results from last 7 days   Lab Units 05/14/22  2305 05/11/22  2328   CLARITY UA  Clear Clear   COLOR UA  Straw Straw   SPEC GRAV UA  1 015 1 015   PH UA  6 0 6 0   GLUCOSE UA mg/dl 1+* Trace*   KETONES UA mg/dl Negative Negative   BLOOD UA Trace-Intact* Negative   PROTEIN UA mg/dl Negative Negative   NITRITE UA  Negative Negative   BILIRUBIN UA  Negative Negative   UROBILINOGEN UA E U /dl 0 2 0 2   LEUKOCYTES UA  Negative Negative   WBC UA /hpf 1-2*  --    RBC UA /hpf 1-2*  --    BACTERIA UA /hpf Occasional  --    EPITHELIAL CELLS WET PREP /hpf Innumerable*  --    SODIUM UR  92  --    CREATININE UR mg/dL 27 8  27 8  27 8  --    PROTEIN UR mg/dL 22  --    PROT/CREAT RATIO UR  0 79*  --      Results from last 7 days   Lab Units 05/11/22  1631   INFLUENZA A PCR  Negative   INFLUENZA B PCR  Negative   RSV PCR  Negative         Medications:   Scheduled Medications:  apixaban, 2 5 mg, Oral, BID  atorvastatin, 80 mg, Oral, Daily  calcitriol, 0 25 mcg, Oral, Daily  carvedilol, 6 25 mg, Oral, BID With Meals  cholecalciferol, 5,000 Units, Oral, Once per day on Mon Wed Fri  famotidine, 20 mg, Oral, BID  insulin lispro, 1-5 Units, Subcutaneous, TID AC  PARoxetine, 40 mg, Oral, Daily      Continuous IV Infusions:     PRN Meds:  acetaminophen, 650 mg, Oral, Q6H PRN  hydrALAZINE, 5 mg, Intravenous, Q6H PRN  labetalol, 10 mg, Intravenous, Q6H PRN  ondansetron, 4 mg, Intravenous, Q6H PRN        Discharge Plan: [facility]    Network Utilization Review Department  ATTENTION: Please call with any questions or concerns to [phone] and carefully listen to the prompts so that you are directed to the right person  All voicemails are confidential   Dinora Muss all requests for admission clinical reviews, approved or denied determinations and any other requests to dedicated fax number below belonging to the campus where the patient is receiving treatment   List of dedicated fax numbers for the Facilities:  [address] DENIALS (Administrative/Medical Necessity) [phone]   [address] (Maternity/NICU/Pediatrics) 261 [facility],[address] [place] [address] [phone]   Jer Correa [address] [facility] [address] [address] [place] [address] [address] [address] [place] [address] [phone]

## 2022-05-16 NOTE — CASE MANAGEMENT
Case Management Discharge Planning Note    Patient name Bethanie Salinas  Location /-01 MRN [id number]  : 1945 Date 2022       Current Admission Date: 2022  Current Admission Diagnosis:Syncope   Patient Active Problem List    Diagnosis Date Noted    Anemia 2022    Humeral fracture 2022    Lung mass 2022    Syncope 2022    Fracture of humeral shaft, right, closed 2022    GIULIANA (acute kidney injury) ([facility] ) 2020    History of DVT (deep vein thrombosis) 2020    Type 2 diabetes mellitus with stage 2 chronic kidney disease and hypertension ([facility] ) 2020    Stroke-like symptoms 2020    Pulmonary nodule 2020    History of breast cancer 2020    Hypertensive urgency 2020    Hypoxia 2019    Bronchospasm     Acute respiratory failure with hypoxia ([facility] ) 2019    DVT (deep venous thrombosis) ([place] [address] ) 2019    Tobacco abuse 2019    Stage 3 chronic kidney disease ([facility] ) 2019    Type 2 diabetes mellitus without complication, without long-term current use of insulin ([place] [address] ) 2019      LOS (days): 4  Geometric Mean LOS (GMLOS) (days): 2 30  Days to GMLOS:-1 8     OBJECTIVE:  Risk of Unplanned Readmission Score: 17 03         Current admission status: Inpatient   Preferred Pharmacy:   [facility]/pharmacy #[id number]- [address] - [address] [place] [address]  Phone: [phone] Fax: [phone]    [id number]  [address] [address]  Phone: [phone] Fax: [phone]    Primary Care Provider: Harry Peters DO    Primary Insurance: Fili Hernandez [facility] REP  Secondary Insurance:     DISCHARGE DETAILS:    Discharge planning discussed with[de-identified] daughter was called at 12:56 pm     Comments - [place] of Choice: hospice order was receieved   family would like [facility]   referral  CM contacted family/caregiver?: Yes             Contacts  Patient Contacts: Hodan Cynthiaalessandro  Relationship to Patient[de-identified] Family (daughter)  Contact Method: Phone  Phone Number: [phone]  Reason/Outcome: Discharge Planning              Other Referral/Resources/Interventions Provided:  Interventions: Hospice  Referral Comments: referral was sent to [address] [place] [address] Way as requested    Would you like to participate in our [address] service program?  : No - Declined    Treatment Team Recommendation: Hospice (hospice care - transportation is needed)

## 2022-05-16 NOTE — PROGRESS NOTES
Renal Quick Note  Kidney function has resolved to  Baseline creatinine of 1 mg/dl  Blood pressure is responding to carvedilol but spikes  Patient may be going home on hospice  Please see note on chest xray discussed with radiology (Dr Elizabeth Bae)  Will sign off and be available if needed

## 2022-05-16 NOTE — PLAN OF CARE
Problem: Potential for Falls  Goal: Patient will remain free of falls  Description: INTERVENTIONS:  - Educate patient/family on patient safety including physical limitations  - Instruct patient to call for assistance with activity   - Consult OT/PT to assist with strengthening/mobility   - Keep Call bell within reach  - Keep bed low and locked with side rails adjusted as appropriate  - Keep care items and personal belongings within reach  - Initiate and maintain comfort rounds  - Make Fall Risk Sign visible to staff  - Offer Toileting every 2 Hours, in advance of need  - Initiate/Maintain bed alarm  - Apply yellow socks and bracelet for high fall risk patients  - Consider moving patient to room near nurses station  Outcome: Progressing     Problem: MOBILITY - ADULT  Goal: Maintain or return to baseline ADL function  Description: INTERVENTIONS:  -  Assess patient's ability to carry out ADLs; assess patient's baseline for ADL function and identify physical deficits which impact ability to perform ADLs (bathing, care of mouth/teeth, toileting, grooming, dressing, etc )  - Assess/evaluate cause of self-care deficits   - Assess range of motion  - Assess patient's mobility; develop plan if impaired  - Assess patient's need for assistive devices and provide as appropriate  - Encourage maximum independence but intervene and supervise when necessary  - Involve family in performance of ADLs  - Assess for home care needs following discharge   - Consider OT consult to assist with ADL evaluation and planning for discharge  - Provide patient education as appropriate  Outcome: Progressing  Goal: Maintains/Returns to pre admission functional level  Description: INTERVENTIONS:  - Perform BMAT or MOVE assessment daily    - Set and communicate daily mobility goal to care team and patient/family/caregiver     - Collaborate with rehabilitation services on mobility goals if consulted  - Record patient progress and toleration of activity level   Outcome: Progressing     Problem: PAIN - ADULT  Goal: Verbalizes/displays adequate comfort level or baseline comfort level  Description: Interventions:  - Encourage patient to monitor pain and request assistance  - Assess pain using appropriate pain scale  - Administer analgesics based on type and severity of pain and evaluate response  - Implement non-pharmacological measures as appropriate and evaluate response  - Consider cultural and social influences on pain and pain management  - Notify physician/advanced practitioner if interventions unsuccessful or patient reports new pain  Outcome: Progressing     Problem: INFECTION - ADULT  Goal: Absence or prevention of progression during hospitalization  Description: INTERVENTIONS:  - Assess and monitor for signs and symptoms of infection  - Monitor lab/diagnostic results  - Monitor all insertion sites, i e  indwelling lines, tubes, and drains  - Monitor endotracheal if appropriate and nasal secretions for changes in amount and color  - [place] appropriate cooling/warming therapies per order  - Administer medications as ordered  - Instruct and encourage patient and family to use good hand hygiene technique  - Identify and instruct in appropriate isolation precautions for identified infection/condition  Outcome: Progressing     Problem: SAFETY ADULT  Goal: Patient will remain free of falls  Description: INTERVENTIONS:  - Educate patient/family on patient safety including physical limitations  - Instruct patient to call for assistance with activity   - Consult OT/PT to assist with strengthening/mobility   - Keep Call bell within reach  - Keep bed low and locked with side rails adjusted as appropriate  - Keep care items and personal belongings within reach  - Initiate and maintain comfort rounds  - Make Fall Risk Sign visible to staff  - Offer Toileting every 2 Hours, in advance of need  - Initiate/Maintain bed alarm  - Apply yellow socks and bracelet for high fall risk patients  - Consider moving patient to room near nurses station  Outcome: Progressing  Goal: Maintain or return to baseline ADL function  Description: INTERVENTIONS:  -  Assess patient's ability to carry out ADLs; assess patient's baseline for ADL function and identify physical deficits which impact ability to perform ADLs (bathing, care of mouth/teeth, toileting, grooming, dressing, etc )  - Assess/evaluate cause of self-care deficits   - Assess range of motion  - Assess patient's mobility; develop plan if impaired  - Assess patient's need for assistive devices and provide as appropriate  - Encourage maximum independence but intervene and supervise when necessary  - Involve family in performance of ADLs  - Assess for home care needs following discharge   - Consider OT consult to assist with ADL evaluation and planning for discharge  - Provide patient education as appropriate  Outcome: Progressing  Goal: Maintains/Returns to pre admission functional level  Description: INTERVENTIONS:  - Perform BMAT or MOVE assessment daily    - Set and communicate daily mobility goal to care team and patient/family/caregiver     - Collaborate with rehabilitation services on mobility goals if consulted  - Record patient progress and toleration of activity level   Outcome: Progressing     Problem: DISCHARGE PLANNING  Goal: Discharge to home or other facility with appropriate resources  Description: INTERVENTIONS:  - Identify barriers to discharge w/patient and caregiver  - Arrange for needed discharge resources and transportation as appropriate  - Identify discharge learning needs (meds, wound care, etc )  - Arrange for interpretive services to assist at discharge as needed  - Refer to Case Management Department for coordinating discharge planning if the patient needs post-hospital services based on physician/advanced practitioner order or complex needs related to functional status, cognitive ability, or social support system  Outcome: Progressing     Problem: Knowledge Deficit  Goal: Patient/family/caregiver demonstrates understanding of disease process, treatment plan, medications, and discharge instructions  Description: Complete learning assessment and assess knowledge base    Interventions:  - Provide teaching at level of understanding  - Provide teaching via preferred learning methods  Outcome: Progressing     Problem: Prexisting or High Potential for Compromised Skin Integrity  Goal: Skin integrity is maintained or improved  Description: INTERVENTIONS:  - Identify patients at risk for skin breakdown  - Assess and monitor skin integrity  - Assess and monitor nutrition and hydration status  - Monitor labs   - Assess for incontinence   - Turn and reposition patient  - Assist with mobility/ambulation  - Relieve pressure over bony prominences  - Avoid friction and shearing  - Provide appropriate hygiene as needed including keeping skin clean and dry  - Evaluate need for skin moisturizer/barrier cream  - Collaborate with interdisciplinary team   - Patient/family teaching  - Consider wound care consult   Outcome: Progressing

## 2022-05-16 NOTE — PROGRESS NOTES
Renal progress note    I personally  reviewed CXR along with Dr Obdulio Lopez (radiology)   She has a new rib fracture on the right vs met vs callus    She has worsening pleural fluid layering posteriorly on the left with pneumonia/consolidation  He recommends a CT  Will defer to primary team

## 2022-05-16 NOTE — PHYSICAL THERAPY NOTE
PHYSICAL THERAPY TREATMENT NOTE  NAME:  Dung Thomson  DATE: 05/16/22    Length Of Stay: 4  Performed at least 2 patient identifiers during session: Name and ID bracelet    TREATMENT FLOWSHEET:    05/16/22 0932   PT Last Visit   PT Visit Date 05/16/22   Note Type   Note Type Treatment   Pain Assessment   Pain Assessment Tool 0-10   Pain Score No Pain   Restrictions/Precautions   Weight Bearing Precautions Per Order Yes   LUE Weight Bearing Per Order NWB   Braces or Orthoses Sling  (LUE)   Other Precautions O2;Fall Risk; Chair Alarm; Bed Alarm  (2LO2)   General   Chart Reviewed Yes   Response to Previous Treatment Patient with no complaints from previous session  Family/Caregiver Present No   Cognition   Overall Cognitive Status WFL   Arousal/Participation Alert; Cooperative   Attention Attends with cues to redirect   Orientation Level Oriented X4   Memory Decreased recall of precautions;Decreased recall of biographical information   Following Commands Follows one step commands without difficulty   Subjective   Subjective "I feel whoozy, you know, funny in my head "   Bed Mobility   Rolling R 5  Supervision   Additional items Assist x 1;HOB elevated; Bedrails; Increased time required;Verbal cues   Rolling L 5  Supervision   Additional items Assist x 1;HOB elevated; Bedrails; Increased time required;Verbal cues   Supine to Sit 4  Minimal assistance   Additional items Assist x 1;HOB elevated; Bedrails; Increased time required;Verbal cues   Additional Comments BP at rest 181/94 sitting BP decreased to 151/94  Pt  tolerated sitting at EOB to perforn sitting balance activities x 8 mins supported with RUE on mattress without LOB  Transfers   Sit to Stand   (CG)   Additional items Assist x 2;Bedrails; Increased time required;Verbal cues   Stand to Sit   (CG)   Additional items Assist x 2;Armrests; Increased time required;Verbal cues   Stand pivot   (CG)   Additional items Assist x 2;Armrests; Increased time required;Verbal cues  (Auxillary support provided)   Additional Comments Pt  toleratd standing x 2mins with BP monitored and decreasing to 89/69 following SPS into recliner following 5 ft of ambulation and reports of increased whooziness  Ambulation/Elevation   Gait pattern Improper Weight shift;Decreased foot clearance;Shuffling   Gait Assistance   (CG)   Additional items Assist x 2;Verbal cues   Assistive Device None  (Auxillary support due to decreasing BP)   Distance 5ft   Ambulation/Elevation Additional Comments Pt  reporting increased "funny feelings" in head and pt  was assited into recliner safely  Balance   Static Sitting Good   Dynamic Sitting Fair +   Static Standing Fair   Dynamic Standing Fair -   Ambulatory Fair -   Endurance Deficit   Endurance Deficit Yes   Endurance Deficit Description decreasing BP from laying at 181/96,to sitting 151/94, to standing following 5 ft and SPS to recliner 89/69  Activity Tolerance   Activity Tolerance Patient limited by fatigue   Medical Staff Made Aware CM made aware   Nurse Made Aware RN made aware   Exercises   Quad Sets Sitting;15 reps;AROM; Bilateral   Heelslides Sitting;15 reps;AROM; Bilateral   Glute Sets Sitting;15 reps;AROM; Bilateral   Hip Flexion Sitting;15 reps;AROM; Bilateral   Hip Abduction Sitting;15 reps;AROM; Bilateral   Hip Adduction Sitting;15 reps;AROM; Bilateral   Knee AROM Long Arc Quad Sitting;15 reps;AROM; Bilateral   Ankle Pumps Sitting;15 reps;AROM; Bilateral   Marching Sitting;15 reps;AROM; Bilateral   Neuro re-ed dynamic sitting balance activities performed includinf multidirectional weight shifting, multidirectional reaching with RUE, and scooting forward, backward, and laterally B directions x 8 mind total to improve dynamic sitting balance  Assessment   Prognosis Fair   Problem List Decreased strength;Decreased endurance; Impaired balance;Decreased mobility; Impaired judgement   Goals   Patient Goals "to feel better "   PT Treatment Day 2   Plan Treatment/Interventions Functional transfer training;LE strengthening/ROM; Therapeutic exercise; Endurance training;Cognitive reorientation;Patient/family training;Equipment eval/education; Bed mobility;Spoke to nursing;Spoke to case management;Gait training   Progress Slow progress, medical status limitations  (orthostatic BP's)   PT Frequency 3-5x/wk   Recommendation   PT Discharge Recommendation Post acute rehabilitation services   AM-P[facility] Basic Mobility Inpatient   Turning in Bed Without Bedrails 3   Lying on Back to Sitting on Edge of Flat Bed 3   Moving Bed to Chair 3   Standing Up From Chair 3   Walk in Room 2   Climb 3-5 Stairs 1   Basic Mobility Inpatient Raw Score 15   Basic Mobility Standardized Score 36 97   Highest Level Of Mobility   -[facility] Goal 4: Move to chair/commode   -HL Achieved 5: Stand (1 or more minutes)       [facility] Short Form Raw Score is 15, Standardized Score is 36 97  A standardized score less than 42 9 suggests the patient may benefit from discharge to post-acute rehabilitation services  Please also refer to the recommendation of the Physical Therapist for safe discharge planning  Pt seen for PT treatment session this date with interventions consisting of bed mobility tasks, transfer training, gait training, seated TE, neuromuscular re-education of movement to improve dynamic sitting balance, standing tolerance, monitoring of orthostatic BP's, and education provided as needed for safety and direction to improve functional mobility and activity tolerance  Pt agreeable to PT treatment session upon arrival, pt found resting in bed  At end of session, pt left sitting OOB in recliner with chair alarm activated, SCD's applied, and all needs ion reach  In comparison to previous session, pt with improvements in ability to  transfer OOB into recliner    Continue to recommend STR at time of d/c in order to maximize pt's functional independence and safety w/ mobility  Pt continues to be functioning below baseline level  PT will continue to see pt while here in order to address the deficits listed above and provide interventions consistent w/ POC in effort to achieve STGs      Karole Comfort, PTA

## 2022-05-16 NOTE — ASSESSMENT & PLAN NOTE
· Due to orthostatic hypotension   · Patient daughter reports patient was recently diagnosed with small-cell lung cancer, currently seeking hospice  · Has had poor p o  Intake over the past several days, upon getting up she reportedly had a syncopal spell  · Daughter reports twitching, but patient awoken briefly after with no post-ictal symptoms  · CT head negative, Chest x-ray showing lung mass  · In ED, patient's initial blood pressure was 435Q systolic, upon sitting 739K  · According to the daughter, patient is only on carvedilol at home  · Cardiology consulted - suggested orthostatic hypotension, no further cardiac workup recommended  · Troponin negative, EKG showed no acute ST changes   · PT and OT recommended rehab  · IV labetalol for SBP >180  · Patient was on carvedilol at home, nephrology on board - increased carvedilol to 6 25 b i d  due to resting hypertension  · Discontinued IV fluid  · Ordered compression stockings  · Patient is medically clear, awaiting rehab  However, today the daughter is not sure whether she really wants the patient to be in rehab  The daughter would like to discuss with hospice team tomorrow    · Hospice liaison consulted

## 2022-05-16 NOTE — ASSESSMENT & PLAN NOTE
· Patient reports history of breast cancer status post resection and chemotherapy  · Currently in surveillance, follows Oncology at [facility]  · Ongoing goals of care discussions being held with family

## 2022-05-17 NOTE — PLAN OF CARE
Problem: Potential for Falls  Goal: Patient will remain free of falls  Description: INTERVENTIONS:  - Educate patient/family on patient safety including physical limitations  - Instruct patient to call for assistance with activity   - Consult OT/PT to assist with strengthening/mobility   - Keep Call bell within reach  - Keep bed low and locked with side rails adjusted as appropriate  - Keep care items and personal belongings within reach  - Initiate and maintain comfort rounds  - Make Fall Risk Sign visible to staff  - Offer Toileting every 1 Hours, in advance of need  - Initiate/Maintain bed alarm  - Obtain necessary fall risk management equipment: bed   - Apply yellow socks and bracelet for high fall risk patients  - Consider moving patient to room near nurses station  5/17/2022 0929 by Vijaya Park RN  Outcome: Progressing  5/17/2022 0929 by Vijaya Park RN  Outcome: Progressing     Problem: MOBILITY - ADULT  Goal: Maintain or return to baseline ADL function  Description: INTERVENTIONS:  -  Assess patient's ability to carry out ADLs; assess patient's baseline for ADL function and identify physical deficits which impact ability to perform ADLs (bathing, care of mouth/teeth, toileting, grooming, dressing, etc )  - Assess/evaluate cause of self-care deficits   - Assess range of motion  - Assess patient's mobility; develop plan if impaired  - Assess patient's need for assistive devices and provide as appropriate  - Encourage maximum independence but intervene and supervise when necessary  - Involve family in performance of ADLs  - Assess for home care needs following discharge   - Consider OT consult to assist with ADL evaluation and planning for discharge  - Provide patient education as appropriate  5/17/2022 0929 by Vijaya Park RN  Outcome: Progressing  5/17/2022 0929 by Vijaya Park RN  Outcome: Progressing  Goal: Maintains/Returns to pre admission functional level  Description: INTERVENTIONS:  - Perform BMAT or MOVE assessment daily    - Set and communicate daily mobility goal to care team and patient/family/caregiver  - Collaborate with rehabilitation services on mobility goals if consulted  - Perform Range of Motion 3 times a day  - Reposition patient every 2 hours    - Dangle patient 3 times a day  - Stand patient 3 times a day  - Ambulate patient 3 times a day  - Out of bed to chair 3 times a day   - Out of bed for meals 3 times a day  - Out of bed for toileting  - Record patient progress and toleration of activity level   5/17/2022 0929 by Gracie Ricardo RN  Outcome: Progressing  5/17/2022 0929 by Gracie Ricardo RN  Outcome: Progressing     Problem: PAIN - ADULT  Goal: Verbalizes/displays adequate comfort level or baseline comfort level  Description: Interventions:  - Encourage patient to monitor pain and request assistance  - Assess pain using appropriate pain scale  - Administer analgesics based on type and severity of pain and evaluate response  - Implement non-pharmacological measures as appropriate and evaluate response  - Consider cultural and social influences on pain and pain management  - Notify physician/advanced practitioner if interventions unsuccessful or patient reports new pain  5/17/2022 0929 by Gracie Ricardo RN  Outcome: Progressing  5/17/2022 0929 by Gracie Ricardo RN  Outcome: Progressing     Problem: INFECTION - ADULT  Goal: Absence or prevention of progression during hospitalization  Description: INTERVENTIONS:  - Assess and monitor for signs and symptoms of infection  - Monitor lab/diagnostic results  - Monitor all insertion sites, i e  indwelling lines, tubes, and drains  - Monitor endotracheal if appropriate and nasal secretions for changes in amount and color  - [place] appropriate cooling/warming therapies per order  - Administer medications as ordered  - Instruct and encourage patient and family to use good hand hygiene technique  - Identify and instruct in appropriate isolation precautions for identified infection/condition  5/17/2022 0929 by Gin Davenport RN  Outcome: Erendira Hernandez  5/17/2022 0929 by Gin Davenport RN  Outcome: Progressing     Problem: SAFETY ADULT  Goal: Patient will remain free of falls  Description: INTERVENTIONS:  - Educate patient/family on patient safety including physical limitations  - Instruct patient to call for assistance with activity   - Consult OT/PT to assist with strengthening/mobility   - Keep Call bell within reach  - Keep bed low and locked with side rails adjusted as appropriate  - Keep care items and personal belongings within reach  - Initiate and maintain comfort rounds  - Make Fall Risk Sign visible to staff  - Offer Toileting every 1 Hours, in advance of need  - Initiate/Maintain bed alarm  - Obtain necessary fall risk management equipment: bed  - Apply yellow socks and bracelet for high fall risk patients  - Consider moving patient to room near nurses station  5/17/2022 0929 by Gin Davenport RN  Outcome: Progressing  5/17/2022 0929 by Gin Davenport RN  Outcome: Progressing  Goal: Maintain or return to baseline ADL function  Description: INTERVENTIONS:  -  Assess patient's ability to carry out ADLs; assess patient's baseline for ADL function and identify physical deficits which impact ability to perform ADLs (bathing, care of mouth/teeth, toileting, grooming, dressing, etc )  - Assess/evaluate cause of self-care deficits   - Assess range of motion  - Assess patient's mobility; develop plan if impaired  - Assess patient's need for assistive devices and provide as appropriate  - Encourage maximum independence but intervene and supervise when necessary  - Involve family in performance of ADLs  - Assess for home care needs following discharge   - Consider OT consult to assist with ADL evaluation and planning for discharge  - Provide patient education as appropriate  5/17/2022 0929 by Gin Davenport RN  Outcome: Progressing  5/17/2022 0929 by Kurt Robertson RN  Outcome: Progressing  Goal: Maintains/Returns to pre admission functional level  Description: INTERVENTIONS:  - Perform BMAT or MOVE assessment daily    - Set and communicate daily mobility goal to care team and patient/family/caregiver  - Collaborate with rehabilitation services on mobility goals if consulted  - Perform Range of Motion 3 times a day  - Reposition patient every 2 hours  - Dangle patient 3 times a day  - Stand patient 3 times a day  - Ambulate patient 3 times a day  - Out of bed to chair 3 times a day   - Out of bed for meals 3 times a day  - Out of bed for toileting  - Record patient progress and toleration of activity level   5/17/2022 0929 by Kurt Robertson RN  Outcome: Progressing  5/17/2022 0929 by Kurt Robertson RN  Outcome: Progressing     Problem: DISCHARGE PLANNING  Goal: Discharge to home or other facility with appropriate resources  Description: INTERVENTIONS:  - Identify barriers to discharge w/patient and caregiver  - Arrange for needed discharge resources and transportation as appropriate  - Identify discharge learning needs (meds, wound care, etc )  - Arrange for interpretive services to assist at discharge as needed  - Refer to Case Management Department for coordinating discharge planning if the patient needs post-hospital services based on physician/advanced practitioner order or complex needs related to functional status, cognitive ability, or social support system  5/17/2022 0929 by Kurt Robertson RN  Outcome: Progressing  5/17/2022 0929 by Kurt Robertson RN  Outcome: Progressing     Problem: Knowledge Deficit  Goal: Patient/family/caregiver demonstrates understanding of disease process, treatment plan, medications, and discharge instructions  Description: Complete learning assessment and assess knowledge base    Interventions:  - Provide teaching at level of understanding  - Provide teaching via preferred learning methods  5/17/2022 0929 by Elmo Mckeon RN  Outcome: Progressing  5/17/2022 0929 by Elmo Mckeon RN  Outcome: Progressing     Problem: Prexisting or High Potential for Compromised Skin Integrity  Goal: Skin integrity is maintained or improved  Description: INTERVENTIONS:  - Identify patients at risk for skin breakdown  - Assess and monitor skin integrity  - Assess and monitor nutrition and hydration status  - Monitor labs   - Assess for incontinence   - Turn and reposition patient  - Assist with mobility/ambulation  - Relieve pressure over bony prominences  - Avoid friction and shearing  - Provide appropriate hygiene as needed including keeping skin clean and dry  - Evaluate need for skin moisturizer/barrier cream  - Collaborate with interdisciplinary team   - Patient/family teaching  - Consider wound care consult   5/17/2022 0929 by Elmo Mckeon RN  Outcome: Progressing  5/17/2022 0929 by Elmo Mckeon RN  Outcome: Progressing

## 2022-05-17 NOTE — CASE MANAGEMENT
Case Management Discharge Planning Note    Patient name Louis Moore  Location /-01 MRN [id number]  : 1945 Date 2022       Current Admission Date: 2022  Current Admission Diagnosis:Syncope   Patient Active Problem List    Diagnosis Date Noted    Anemia 2022    Humeral fracture 2022    Lung mass 2022    Syncope 2022    Fracture of humeral shaft, right, closed 2022    GIULIANA (acute kidney injury) ([facility] ) 2020    History of DVT (deep vein thrombosis) 2020    Type 2 diabetes mellitus with stage 2 chronic kidney disease and hypertension ([facility] ) 2020    Stroke-like symptoms 2020    Pulmonary nodule 2020    History of breast cancer 2020    Hypertensive urgency 2020    Hypoxia 2019    Bronchospasm     Acute respiratory failure with hypoxia ([facility] ) 2019    DVT (deep venous thrombosis) ([place] [address] ) 2019    Tobacco abuse 2019    Stage 3 [facility] ) 2019    Type 2 diabetes mellitus without complication, without long-term current use of insulin ([place] [address] ) 2019      LOS (days): 5  Geometric Mean LOS (GMLOS) (days): 2 30  Days to GMLOS:-2 9     OBJECTIVE:  Risk of Unplanned Readmission Score: 19 51         Current admission status: Inpatient   Preferred Pharmacy:   [facility]pharm[facility] #[id number]- [address] [address]  Phone: [phone] Fax: [phone]    [id number] [facility] [place]  [address] [address]  Phone: [phone] Fax: [phone]    Primary Care Provider: Candida Valdivia DO    Primary Insurance: [employer] [facility] REP  Secondary Insurance:     DISCHARGE DETAILS:    Discharge planning discussed with[de-identified] patient and daughter Amira Record of Choice: Yes  Comments - Freedom of Choice: hopsice order was placed, family's choice [facility]   family did meet wiht Keely and the daughter was not sure about hospice care, she now feels her mother is more stable to come home and she is now in agreement with home hospice , cm will make Keely aware  CM contacted family/caregiver?: Yes             Contacts  Patient Contacts: Jessie Thomson  Relationship to Patient[de-identified] Family (daughter)  Contact Method:  In Person  Reason/Outcome: Discharge Planning              Other Referral/Resources/Interventions Provided:  Interventions: Hospice  Referral Comments: referral sent to [address] [place] [address]    Would you like to participate in our [address] service program?  : No - Declined    Treatment Team Recommendation: Hospice, Home (home with daughter and hospice care- bls transport)

## 2022-05-17 NOTE — SPEECH THERAPY NOTE
Speech Language/Pathology  Speech/Language Pathology  Assessment    Patient Name: Chuckie Escobar  OWGNV'R Date: 5/17/2022     Problem List  Principal Problem:    Syncope  Active Problems:    Acute respiratory failure with hypoxia (HCC)    DVT (deep venous thrombosis) (HCC)    Stage 3 chronic kidney disease (HCC)    Type 2 diabetes mellitus without complication, without long-term current use of insulin (HCC)    History of breast cancer    Hypertensive urgency    Lung mass    Humeral fracture    Anemia    Past Medical History  Past Medical History:   Diagnosis Date    Anxiety     Anxiety with depression     Breast cancer [facility])     December 2019 - right sided    Breast cancer ([facility] )     Depression     Diabetes mellitus [facility] )     Hypertension     Port-A-Cath in place     Left    TIA (transient ischemic attack)     only a possible diagnosis    TIA (transient ischemic attack)     "possible"     Past Surgical History  Past Surgical History:   Procedure Laterality Date    AXILLARY NODE DISSECTION Bilateral     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    AXILLARY NODE DISSECTION      CHOLECYSTECTOMY      FOOT SURGERY      IR PORT PLACEMENT      REMOVAL VENOUS PORT (PORT-A-CATH)  04/06/2022    SIMPLE MASTECTOMY Right     TONSILLECTOMY      TONSILLECTOMY AND ADENOIDECTOMY      WISDOM TOOTH EXTRACTION      WRIST SURGERY Right         05/17/22 1000   Patient Information   Current Medical syncope   Past Medical History anemia, lung mass   Swallow Information   Current Risks for Dysphagia & Aspiration Respiratory compromise;Mental status change   Current Symptoms/Concerns Clear throat;Decreased oral intake   Current Diet Dysphagia advance; Thin liquid   Baseline Diet Regular; Thin liquids   Baseline Assessment   Behavior/Cognition Alert; Cooperative; Interactive   Speech/Language Status WFL   Patient Positioning Upright in bed   Swallow Mechanism Exam   Labial Symmetry WFL   Labial Strength WFL   Labial ROM WFL   Labial Sensation [facility] Facial Symmetry WFL   Facial Strength WFL   Facial ROM WFL   Facial Sensation WFL   Lingual Symmetry WFL   Lingual Strength WFL   Lingual ROM WFL   Lingual Sensation WFL   Dentition Adequate   Volitional Cough Strong   Consistencies Assessed and Performance   Materials Admnistered Mechanical Soft/Level 2;Soft/Level 3; Thin liquid   Oral Stage WFL   Phargngeal Stage Mild impaired   Swallow Mechanics Mild delayed;Swallow initation;Good Larygneal rise   Esophageal Concerns Hx GERDS;Early satiety; Other (Comment)  (Pt with belching throughout meal)   Summary   Swallow Summary Patient received upright for evaluation today having just woken up  Pt took pills for NSG and breakfast tray set up provided by NSG  Patient denies having difficulty swallowing, reports no coughing or choking "except for yesterday "  Patient demonstrating tolerance of thin via cup, mech soft and soft solids with no overt s/s of penetration or aspiration  Pt did not experience throat clear but did demonstrate s/s of GERD including belching throughout the meal   Paired with feeling full early on pt may have esophageal component of swallowing difficulty  Pt reports vocal changes periodically but is unable to describe them to the clinician  Pt tolerating present diet with good overall swallow timing and ROM  OME is WFL  No further ST needs at this time  Recommendations   Risk for Aspiration Mild   Recommendations Consider oral diet   Diet Solid Recommendation Level 3 Dysphagia/ advanced/ soft to chew   Diet Liquid Recommendation Thin liquid   Recommended Form of Meds Whole; With thin liquid   General Precautions Feed only when alert;Minimize distractions;Upright as possible for all oral intake;Remain upright for 45 mins after meals   Compensatory Swallowing Strategies Alternate solids and liquids   Results Reviewed with RN;PT/Family/Caregiver   Speech Therapy Prognosis   Prognosis Good   Prognosis Considerations Medical Prognosis

## 2022-05-17 NOTE — ASSESSMENT & PLAN NOTE
· Hemoglobin stable at 7 9  · Most likely dilutional  · Hemoglobin slowly downtrending  · Patient had recent humeral fracture, no evidence of hematoma  · Follow CBC

## 2022-05-17 NOTE — PROGRESS NOTES
[facility] 43  Progress Note Kirsten Eis 1945, 76 y o  female MRN: [id number]  Unit/Bed#: -01 Encounter: [id number]  Primary Care Provider: Dana Castillo DO   Date and time admitted to hospital: 5/11/2022  1:44 PM    * Syncope  Assessment & Plan  · Due to orthostatic hypotension   · Patient daughter reports patient was recently diagnosed with small-cell lung cancer, currently seeking hospice  · CT head negative  · Chest x-ray showing lung mass  · In ED, patient's initial blood pressure was 414U systolic, upon sitting 749J  · Troponin negative, EKG showed no acute ST changes   · PT and OT recommended rehab  · IV labetalol and IV hydralazine  · Amlodipine 5 mg PO daily started  · Continue carvedilol as previously prescribed  · Ordered compression stockings  · Patient is medically stable for rehab  · Patient's daughter would like home hospice  · Ongoing goals of care discussions being held with patient and daughter  · Hospice liaison consulted  · Case management following    Lung mass  Assessment & Plan  · Complain of dyspnea  · Chest x-ray on admission shows left paramediastinal mass, left upper lobe mass and left pleural effusion  · Patient follows with Oncology at [facility]  · Unable to review documentation  · Per daughter, reports diagnosis of small-cell lung cancer, recently had bed port removed declining further chemotherapy  · Initially daughter said she does not want to pursue hospice until she sees the oncologist, however due to rapid decline in her clinical status, daughter would like to discuss with hospice while she is in the hospital  · Ongoing goals of care discussions  · Hospice liaison has been consulted    Hypertensive urgency  Assessment & Plan  · Blood pressures have been markedly elevated  · Amlodipine has been initiated  · Continue carvedilol as previously prescribed  · IV hydralazine and labetalol  · Monitor blood pressures    Humeral fracture  Assessment & Plan  · Patient had a fall at the night of 05/12/2022, with nondisplaced fracture  · Orthopedic consult - recommended conservative management  · PT and OT recommended rehab  · Due to other clinical comorbidities, the daughter is interested in home hospice  · The daughter will discuss with hospice team    Anemia  Assessment & Plan  · Hemoglobin stable at 7 9  · Most likely dilutional  · Hemoglobin slowly downtrending  · Patient had recent humeral fracture, no evidence of hematoma  · Follow CBC    Acute respiratory failure with hypoxia (HCC)  Assessment & Plan  · Requiring 2 L nasal cannula supplemental O2 this morning  · Likely secondary to lung mass verses anxiety  · Wean O2 as tolerated  · Ongoing goals of care discussions being held with patient and family  · Respiratory protocol    DVT (deep venous thrombosis) ([facility] )  Assessment & Plan  · History of DVT, on Eliquis 2 5 b i d , will continue    Stage 3 chronic kidney disease [facility])  Assessment & Plan  Lab Results   Component Value Date    EGFR 45 05/17/2022    EGFR 53 05/16/2022    EGFR 50 05/15/2022    CREATININE 1 14 05/17/2022    CREATININE 1 00 05/16/2022    CREATININE 1 06 05/15/2022     · Creatinine back to baseline  · Discontinue IV fluid   · Nephrology on board    Type 2 diabetes mellitus without complication, without long-term current use of insulin ([facility] )  Assessment & Plan  Lab Results   Component Value Date    HGBA1C 7 6 (H) 05/12/2022     · Hold home p o   Diabetic medication  · Hb A1c 7 7, sliding scale and Accu-Cheks    History of breast cancer  Assessment & Plan  · Patient reports history of breast cancer status post resection and chemotherapy  · Currently in surveillance, follows Oncology at [facility]  · Ongoing goals of care discussions being held with family    VTE Pharmacologic Prophylaxis:  Eliquis    Patient Centered Rounds:  Patient care rounds were performed with nursing    Discussions with Specialists or Other Care Team Provider:  Case Management, nursing, hospice liaison    Education and Discussions with Family / Patient:  Spoke with patient's daughter Ross Barron    Time Spent for Care: 30  More than 50% of total time spent on counseling and coordination of care as described above  Current Length of Stay: 5 day(s)    Current Patient Status: Inpatient     Certification Statement: The patient will continue to require additional inpatient hospital stay due to placement to home hospice ; ongoing goals of care discussions being held with patient and family    Discharge Plan:  Home Hospice    Code Status: Level 3 - DNAR and DNI      Subjective:   Patient seen and examined at bedside  Mildly anxious overnight  Sleeping comfortably in bed this morning  Spoke with patient's daughter via telephone who will be in to see patient later this afternoon  Will likely transition to comfort measures only pending discussions  Objective:     Vitals:   Temp (24hrs), Av 4 °F (36 9 °C), Min:98 1 °F (36 7 °C), Max:98 5 °F (36 9 °C)    Temp:  [98 1 °F (36 7 °C)-98 5 °F (36 9 °C)] 98 1 °F (36 7 °C)  HR:  [89-96] 96  Resp:  [18-20] 18  BP: (169-202)/() 175/101  SpO2:  [90 %-98 %] 97 %  Body mass index is 28 24 kg/m²  Input and Output Summary (last 24 hours): Intake/Output Summary (Last 24 hours) at 2022 1004  Last data filed at 2022 1901  Gross per 24 hour   Intake 0 ml   Output 1 ml   Net -1 ml       Physical Exam:     Physical Exam  Vitals and nursing note reviewed  Constitutional:       General: She is not in acute distress  Appearance: She is well-developed  HENT:      Head: Normocephalic and atraumatic  Eyes:      Conjunctiva/sclera: Conjunctivae normal    Cardiovascular:      Rate and Rhythm: Normal rate and regular rhythm  Heart sounds: No murmur heard  Pulmonary:      Effort: Pulmonary effort is normal  No respiratory distress  Breath sounds: Normal breath sounds  Abdominal:      Palpations: Abdomen is soft  Tenderness: There is no abdominal tenderness  Musculoskeletal:      Cervical back: Neck supple  Skin:     General: Skin is warm and dry  Findings: Bruising present  Neurological:      Mental Status: She is alert  Additional Data:     Labs:  I have reviewed pertinent results     Results from last 7 days   Lab Units 05/17/22  0511   WBC Thousand/uL 9 27   HEMOGLOBIN g/dL 7 9*   HEMATOCRIT % 26 1*   PLATELETS Thousands/uL 260   NEUTROS PCT % 77*   LYMPHS PCT % 14   MONOS PCT % 8   EOS PCT % 0     Results from last 7 days   Lab Units 05/17/22  0511   SODIUM mmol/L 135   POTASSIUM mmol/L 4 4   CHLORIDE mmol/L 98   CO2 mmol/L 30   BUN mg/dL 20   CREATININE mg/dL 1 14   ANION GAP mmol/L 7   CALCIUM mg/dL 8 5   ALBUMIN g/dL 3 0*   TOTAL BILIRUBIN mg/dL 0 99   ALK PHOS U/L 81   ALT U/L <3*   AST U/L 6*   GLUCOSE RANDOM mg/dL 155*         Results from last 7 days   Lab Units 05/17/22  0612 05/16/22  2118 05/16/22  1524 05/16/22  1120 05/16/22  0550 05/15/22  2121 05/15/22  2020 05/15/22  1509 05/15/22  1103 05/15/22  0547 05/14/22  2120 05/14/22  1532   POC GLUCOSE mg/dl 168* 241* 133 195* 148* 207* 173* 166* 191* 197* 256* 214*     Results from last 7 days   Lab Units 05/12/22  0517   HEMOGLOBIN A1C % 7 6*             Imaging: I have reviewed pertinent imaging       Recent Cultures (last 7 days):           Last 24 Hours Medication List:   Current Facility-Administered Medications   Medication Dose Route Frequency Provider Last Rate    acetaminophen  650 mg Oral Q6H PRN Renata Schroeder MD      amLODIPine  5 mg Oral Daily Curly Dura, DO      apixaban  2 5 mg Oral BID Renata Schroeder MD      atorvastatin  80 mg Oral Daily Renata Schroeder MD      calcitriol  0 25 mcg Oral Daily Renata Schroeder MD      carvedilol  6 25 mg Oral BID With Meals Mekhi Lugo MD      cholecalciferol  5,000 Units Oral Once per day on Mon Wed Fri Renata Schroeder MD      famotidine  20 mg Oral BID Renata Schroeder MD  hydrALAZINE  5 mg Intravenous Q6H PRN Frankie Rang, DO      insulin lispro  1-5 Units Subcutaneous TID AC Edvin Irene MD      labetalol  10 mg Intravenous Q6H PRN Leena Zhao MD      LORazepam  0 5 mg Intravenous Q4H PRN Frankie Rang, DO      ondansetron  4 mg Intravenous Q6H PRN Yury Stewart MD      PARoxetine  40 mg Oral Daily Leena Zhao MD          Today, Patient Was Seen By: Frankie Marie DO    ** Please Note: Dictation voice to text software may have been used in the creation of this document   **

## 2022-05-17 NOTE — ASSESSMENT & PLAN NOTE
· Requiring 2 L nasal cannula supplemental O2 this morning  · Likely secondary to lung mass verses anxiety  · Wean O2 as tolerated  · Ongoing goals of care discussions being held with patient and family  · Respiratory protocol

## 2022-05-17 NOTE — ASSESSMENT & PLAN NOTE
· Complain of dyspnea  · Chest x-ray on admission shows left paramediastinal mass, left upper lobe mass and left pleural effusion  · Patient follows with Oncology at [facility]  · Unable to review documentation  · Per daughter, reports diagnosis of small-cell lung cancer, recently had bed port removed declining further chemotherapy  · Initially daughter said she does not want to pursue hospice until she sees the oncologist, however due to rapid decline in her clinical status, daughter would like to discuss with hospice while she is in the hospital  · Ongoing goals of care discussions  · Hospice liaison has been consulted

## 2022-05-17 NOTE — ASSESSMENT & PLAN NOTE
· Patient had a fall at the night of 05/12/2022, with nondisplaced fracture  · Orthopedic consult - recommended conservative management  · PT and OT recommended rehab  · Due to other clinical comorbidities, the daughter is interested in home hospice  · The daughter will discuss with hospice team

## 2022-05-17 NOTE — ASSESSMENT & PLAN NOTE
· Due to orthostatic hypotension   · Patient daughter reports patient was recently diagnosed with small-cell lung cancer, currently seeking hospice  · CT head negative  · Chest x-ray showing lung mass  · In ED, patient's initial blood pressure was 175D systolic, upon sitting 865D  · Troponin negative, EKG showed no acute ST changes   · PT and OT recommended rehab  · IV labetalol and IV hydralazine  · Amlodipine 5 mg PO daily started  · Continue carvedilol as previously prescribed  · Ordered compression stockings  · Patient is medically stable for rehab  · Patient's daughter would like home hospice  · Ongoing goals of care discussions being held with patient and daughter  · Hospice liaison consulted  · Case management following

## 2022-05-17 NOTE — ASSESSMENT & PLAN NOTE
· Patient reports history of breast cancer status post resection and chemotherapy  · Currently in surveillance, follows Oncology at [facility]  · Ongoing goals of care discussions being held with family

## 2022-05-17 NOTE — ASSESSMENT & PLAN NOTE
· Blood pressures have been markedly elevated  · Amlodipine has been initiated  · Continue carvedilol as previously prescribed  · IV hydralazine and labetalol  · Monitor blood pressures

## 2022-05-17 NOTE — PLAN OF CARE
Problem: Potential for Falls  Goal: Patient will remain free of falls  Description: INTERVENTIONS:  - Educate patient/family on patient safety including physical limitations  - Instruct patient to call for assistance with activity   - Consult OT/PT to assist with strengthening/mobility   - Keep Call bell within reach  - Keep bed low and locked with side rails adjusted as appropriate  - Keep care items and personal belongings within reach  - Initiate and maintain comfort rounds  - Make Fall Risk Sign visible to staff  - Offer Toileting every 2 Hours, in advance of need  - Initiate/Maintain bed alarm  - Apply yellow socks and bracelet for high fall risk patients  - Consider moving patient to room near nurses station  Outcome: Progressing     Problem: MOBILITY - ADULT  Goal: Maintain or return to baseline ADL function  Description: INTERVENTIONS:  -  Assess patient's ability to carry out ADLs; assess patient's baseline for ADL function and identify physical deficits which impact ability to perform ADLs (bathing, care of mouth/teeth, toileting, grooming, dressing, etc )  - Assess/evaluate cause of self-care deficits   - Assess range of motion  - Assess patient's mobility; develop plan if impaired  - Assess patient's need for assistive devices and provide as appropriate  - Encourage maximum independence but intervene and supervise when necessary  - Involve family in performance of ADLs  - Assess for home care needs following discharge   - Consider OT consult to assist with ADL evaluation and planning for discharge  - Provide patient education as appropriate  Outcome: Progressing  Goal: Maintains/Returns to pre admission functional level  Description: INTERVENTIONS:  - Perform BMAT or MOVE assessment daily    - Set and communicate daily mobility goal to care team and patient/family/caregiver     - Collaborate with rehabilitation services on mobility goals if consulted  - Dangle patient 2-3 times a day  - Stand patient 2-3 times a day  - Out of bed for toileting  - Record patient progress and toleration of activity level   Outcome: Progressing     Problem: PAIN - ADULT  Goal: Verbalizes/displays adequate comfort level or baseline comfort level  Description: Interventions:  - Encourage patient to monitor pain and request assistance  - Assess pain using appropriate pain scale  - Administer analgesics based on type and severity of pain and evaluate response  - Implement non-pharmacological measures as appropriate and evaluate response  - Consider cultural and social influences on pain and pain management  - Notify physician/advanced practitioner if interventions unsuccessful or patient reports new pain  Outcome: Progressing     Problem: INFECTION - ADULT  Goal: Absence or prevention of progression during hospitalization  Description: INTERVENTIONS:  - Assess and monitor for signs and symptoms of infection  - Monitor lab/diagnostic results  - Monitor all insertion sites, i e  indwelling lines, tubes, and drains  - Monitor endotracheal if appropriate and nasal secretions for changes in amount and color  - [place] appropriate cooling/warming therapies per order  - Administer medications as ordered  - Instruct and encourage patient and family to use good hand hygiene technique  - Identify and instruct in appropriate isolation precautions for identified infection/condition  Outcome: Progressing     Problem: SAFETY ADULT  Goal: Patient will remain free of falls  Description: INTERVENTIONS:  - Educate patient/family on patient safety including physical limitations  - Instruct patient to call for assistance with activity   - Consult OT/PT to assist with strengthening/mobility   - Keep Call bell within reach  - Keep bed low and locked with side rails adjusted as appropriate  - Keep care items and personal belongings within reach  - Initiate and maintain comfort rounds  - Make Fall Risk Sign visible to staff  - Offer Toileting every 2 Hours, in advance of need  - Initiate/Maintain bed alarm  - Apply yellow socks and bracelet for high fall risk patients  - Consider moving patient to room near nurses station  Outcome: Progressing  Goal: Maintain or return to baseline ADL function  Description: INTERVENTIONS:  -  Assess patient's ability to carry out ADLs; assess patient's baseline for ADL function and identify physical deficits which impact ability to perform ADLs (bathing, care of mouth/teeth, toileting, grooming, dressing, etc )  - Assess/evaluate cause of self-care deficits   - Assess range of motion  - Assess patient's mobility; develop plan if impaired  - Assess patient's need for assistive devices and provide as appropriate  - Encourage maximum independence but intervene and supervise when necessary  - Involve family in performance of ADLs  - Assess for home care needs following discharge   - Consider OT consult to assist with ADL evaluation and planning for discharge  - Provide patient education as appropriate  Outcome: Progressing  Goal: Maintains/Returns to pre admission functional level  Description: INTERVENTIONS:  - Perform BMAT or MOVE assessment daily    - Set and communicate daily mobility goal to care team and patient/family/caregiver     - Collaborate with rehabilitation services on mobility goals if consulted  - Dangle patient 2-3 times a day  - Stand patient 2-3 times a day  - Out of bed for toileting  - Record patient progress and toleration of activity level   Outcome: Progressing     Problem: DISCHARGE PLANNING  Goal: Discharge to home or other facility with appropriate resources  Description: INTERVENTIONS:  - Identify barriers to discharge w/patient and caregiver  - Arrange for needed discharge resources and transportation as appropriate  - Identify discharge learning needs (meds, wound care, etc )  - Arrange for interpretive services to assist at discharge as needed  - Refer to Case Management Department for coordinating discharge planning if the patient needs post-hospital services based on physician/advanced practitioner order or complex needs related to functional status, cognitive ability, or social support system  Outcome: Progressing     Problem: Knowledge Deficit  Goal: Patient/family/caregiver demonstrates understanding of disease process, treatment plan, medications, and discharge instructions  Description: Complete learning assessment and assess knowledge base    Interventions:  - Provide teaching at level of understanding  - Provide teaching via preferred learning methods  Outcome: Progressing     Problem: Prexisting or High Potential for Compromised Skin Integrity  Goal: Skin integrity is maintained or improved  Description: INTERVENTIONS:  - Identify patients at risk for skin breakdown  - Assess and monitor skin integrity  - Assess and monitor nutrition and hydration status  - Monitor labs   - Assess for incontinence   - Turn and reposition patient  - Assist with mobility/ambulation  - Relieve pressure over bony prominences  - Avoid friction and shearing  - Provide appropriate hygiene as needed including keeping skin clean and dry  - Evaluate need for skin moisturizer/barrier cream  - Collaborate with interdisciplinary team   - Patient/family teaching  - Consider wound care consult   Outcome: Progressing

## 2022-05-18 NOTE — ASSESSMENT & PLAN NOTE
· Complain of dyspnea  · Chest x-ray on admission shows left paramediastinal mass, left upper lobe mass and left pleural effusion  · Patient follows with Oncology at [facility]  · Unable to review documentation  · Per daughter, reports diagnosis of small-cell lung cancer, recently had bed port removed declining further chemotherapy  · Initially daughter said she does not want to pursue hospice until she sees the oncologist, however due to rapid decline in her clinical status, daughter would like to discuss with hospice while she is in the hospital  · Ongoing goals of care discussions  · Hospice liaison has been consulted  · Daughter interested in home hospice  · Case management following

## 2022-05-18 NOTE — ASSESSMENT & PLAN NOTE
· Due to orthostatic hypotension   · Patient daughter reports patient was recently diagnosed with small-cell lung cancer, currently seeking hospice  · CT head negative  · Chest x-ray showing lung mass  · In ED, patient's initial blood pressure was 351G systolic, upon sitting 012Y  · Troponin negative, EKG showed no acute ST changes   · PT and OT recommended rehab  · IV labetalol and IV hydralazine  · Amlodipine 5 mg PO daily started  · Continue carvedilol as previously prescribed  · Ordered compression stockings  · Patient is medically stable for rehab  · Patient's daughter would like home hospice  · Ongoing goals of care discussions being held with patient and daughter  · Hospice liaison consulted  · Case management following  · Patient will be discharged to home hospice on Friday as patient's daughter is unable to care for patient at home until all equipment has been delivered and set up

## 2022-05-18 NOTE — PLAN OF CARE
Problem: Potential for Falls  Goal: Patient will remain free of falls  Description: INTERVENTIONS:  - Educate patient/family on patient safety including physical limitations  - Instruct patient to call for assistance with activity   - Consult OT/PT to assist with strengthening/mobility   - Keep Call bell within reach  - Keep bed low and locked with side rails adjusted as appropriate  - Keep care items and personal belongings within reach  - Initiate and maintain comfort rounds  - Make Fall Risk Sign visible to staff  - Offer Toileting every 1 Hours, in advance of need  - Initiate/Maintain bed alarm  - Obtain necessary fall risk management equipment: bed   - Apply yellow socks and bracelet for high fall risk patients  - Consider moving patient to room near nurses station  5/18/2022 0730 by Gin Davenport RN  Outcome: Progressing  5/18/2022 0730 by Gin Davenport RN  Outcome: Progressing     Problem: MOBILITY - ADULT  Goal: Maintain or return to baseline ADL function  Description: INTERVENTIONS:  -  Assess patient's ability to carry out ADLs; assess patient's baseline for ADL function and identify physical deficits which impact ability to perform ADLs (bathing, care of mouth/teeth, toileting, grooming, dressing, etc )  - Assess/evaluate cause of self-care deficits   - Assess range of motion  - Assess patient's mobility; develop plan if impaired  - Assess patient's need for assistive devices and provide as appropriate  - Encourage maximum independence but intervene and supervise when necessary  - Involve family in performance of ADLs  - Assess for home care needs following discharge   - Consider OT consult to assist with ADL evaluation and planning for discharge  - Provide patient education as appropriate  5/18/2022 0730 by Gin Davenport RN  Outcome: Progressing  5/18/2022 0730 by Gin Davenport RN  Outcome: Progressing  Goal: Maintains/Returns to pre admission functional level  Description: INTERVENTIONS:  - Perform BMAT or MOVE assessment daily    - Set and communicate daily mobility goal to care team and patient/family/caregiver  - Collaborate with rehabilitation services on mobility goals if consulted  - Perform Range of Motion 3 times a day  - Reposition patient every 2 hours    - Dangle patient 3 times a day  - Stand patient 3 times a day  - Ambulate patient 3 times a day  - Out of bed to chair 3 times a day   - Out of bed for meals 3 times a day  - Out of bed for toileting  - Record patient progress and toleration of activity level   5/18/2022 0730 by Cyn Ghosh RN  Outcome: Progressing  5/18/2022 0730 by Cyn Ghosh RN  Outcome: Progressing     Problem: PAIN - ADULT  Goal: Verbalizes/displays adequate comfort level or baseline comfort level  Description: Interventions:  - Encourage patient to monitor pain and request assistance  - Assess pain using appropriate pain scale  - Administer analgesics based on type and severity of pain and evaluate response  - Implement non-pharmacological measures as appropriate and evaluate response  - Consider cultural and social influences on pain and pain management  - Notify physician/advanced practitioner if interventions unsuccessful or patient reports new pain  5/18/2022 0730 by Cyn hGosh RN  Outcome: Progressing  5/18/2022 0730 by Cyn Ghosh RN  Outcome: Progressing     Problem: INFECTION - ADULT  Goal: Absence or prevention of progression during hospitalization  Description: INTERVENTIONS:  - Assess and monitor for signs and symptoms of infection  - Monitor lab/diagnostic results  - Monitor all insertion sites, i e  indwelling lines, tubes, and drains  - Monitor endotracheal if appropriate and nasal secretions for changes in amount and color  - [place] appropriate cooling/warming therapies per order  - Administer medications as ordered  - Instruct and encourage patient and family to use good hand hygiene technique  - Identify and instruct in appropriate isolation precautions for identified infection/condition  5/18/2022 0730 by Jaclyn Menon RN  Outcome: Progressing  5/18/2022 0730 by Jaclyn Menon RN  Outcome: Progressing     Problem: SAFETY ADULT  Goal: Patient will remain free of falls  Description: INTERVENTIONS:  - Educate patient/family on patient safety including physical limitations  - Instruct patient to call for assistance with activity   - Consult OT/PT to assist with strengthening/mobility   - Keep Call bell within reach  - Keep bed low and locked with side rails adjusted as appropriate  - Keep care items and personal belongings within reach  - Initiate and maintain comfort rounds  - Make Fall Risk Sign visible to staff  - Offer Toileting every 1 Hours, in advance of need  - Initiate/Maintain bed alarm  - Obtain necessary fall risk management equipment: bed   - Apply yellow socks and bracelet for high fall risk patients  - Consider moving patient to room near nurses station  5/18/2022 0730 by Jaclyn Menon RN  Outcome: Progressing  5/18/2022 0730 by Jaclyn Menon RN  Outcome: Progressing  Goal: Maintain or return to baseline ADL function  Description: INTERVENTIONS:  -  Assess patient's ability to carry out ADLs; assess patient's baseline for ADL function and identify physical deficits which impact ability to perform ADLs (bathing, care of mouth/teeth, toileting, grooming, dressing, etc )  - Assess/evaluate cause of self-care deficits   - Assess range of motion  - Assess patient's mobility; develop plan if impaired  - Assess patient's need for assistive devices and provide as appropriate  - Encourage maximum independence but intervene and supervise when necessary  - Involve family in performance of ADLs  - Assess for home care needs following discharge   - Consider OT consult to assist with ADL evaluation and planning for discharge  - Provide patient education as appropriate  5/18/2022 0730 by Gisele Perez MANOJ Conde  Outcome: Progressing  5/18/2022 0730 by Gracie Ricardo RN  Outcome: Progressing  Goal: Maintains/Returns to pre admission functional level  Description: INTERVENTIONS:  - Perform BMAT or MOVE assessment daily    - Set and communicate daily mobility goal to care team and patient/family/caregiver  - Collaborate with rehabilitation services on mobility goals if consulted  - Perform Range of Motion 3 times a day  - Reposition patient every 2 hours  - Dangle patient 3 times a day  - Stand patient 3 times a day  - Ambulate patient 3 times a day  - Out of bed to chair 3 times a day   - Out of bed for meals 3 times a day  - Out of bed for toileting  - Record patient progress and toleration of activity level   5/18/2022 0730 by Gracie Ricardo RN  Outcome: Progressing  5/18/2022 0730 by Gracie Ricardo RN  Outcome: Progressing     Problem: DISCHARGE PLANNING  Goal: Discharge to home or other facility with appropriate resources  Description: INTERVENTIONS:  - Identify barriers to discharge w/patient and caregiver  - Arrange for needed discharge resources and transportation as appropriate  - Identify discharge learning needs (meds, wound care, etc )  - Arrange for interpretive services to assist at discharge as needed  - Refer to Case Management Department for coordinating discharge planning if the patient needs post-hospital services based on physician/advanced practitioner order or complex needs related to functional status, cognitive ability, or social support system  5/18/2022 0730 by Gracie Ricardo RN  Outcome: Progressing  5/18/2022 0730 by Gracie Ricardo RN  Outcome: Progressing     Problem: Knowledge Deficit  Goal: Patient/family/caregiver demonstrates understanding of disease process, treatment plan, medications, and discharge instructions  Description: Complete learning assessment and assess knowledge base    Interventions:  - Provide teaching at level of understanding  - Provide teaching via preferred learning methods  5/18/2022 0730 by Cyn Ghosh RN  Outcome: Progressing  5/18/2022 0730 by Cyn Ghosh RN  Outcome: Progressing     Problem: Prexisting or High Potential for Compromised Skin Integrity  Goal: Skin integrity is maintained or improved  Description: INTERVENTIONS:  - Identify patients at risk for skin breakdown  - Assess and monitor skin integrity  - Assess and monitor nutrition and hydration status  - Monitor labs   - Assess for incontinence   - Turn and reposition patient  - Assist with mobility/ambulation  - Relieve pressure over bony prominences  - Avoid friction and shearing  - Provide appropriate hygiene as needed including keeping skin clean and dry  - Evaluate need for skin moisturizer/barrier cream  - Collaborate with interdisciplinary team   - Patient/family teaching  - Consider wound care consult   5/18/2022 0730 by Cyn Ghosh RN  Outcome: Progressing  5/18/2022 0730 by Cyn Ghosh RN  Outcome: Progressing     Problem: Nutrition/Hydration-ADULT  Goal: Nutrient/Hydration intake appropriate for improving, restoring or maintaining nutritional needs  Description: Monitor and assess patient's nutrition/hydration status for malnutrition  Collaborate with interdisciplinary team and initiate plan and interventions as ordered  Monitor patient's weight and dietary intake as ordered or per policy  Utilize nutrition screening tool and intervene as necessary  Determine patient's food preferences and provide high-protein, high-caloric foods as appropriate       INTERVENTIONS:  - Monitor oral intake, urinary output, labs, and treatment plans  - Assess nutrition and hydration status and recommend course of action  - Evaluate amount of meals eaten  - Assist patient with eating if necessary   - Allow adequate time for meals  - Recommend/ encourage appropriate diets, oral nutritional supplements, and vitamin/mineral supplements  - Order, calculate, and assess calorie counts as needed  - Recommend, monitor, and adjust tube feedings and TPN/PPN based on assessed needs  - Assess need for intravenous fluids  - Provide specific nutrition/hydration education as appropriate  - Include patient/family/caregiver in decisions related to nutrition  5/18/2022 0730 by Felix Valenzuela, RN  Outcome: Progressing  5/18/2022 0730 by Felix Valenzuela, RN  Outcome: Progressing

## 2022-05-18 NOTE — CASE MANAGEMENT
Case Management Discharge Planning Note    Patient name Yadira Torres  Location /-01 MRN [id number]  : 1945 Date 2022       Current Admission Date: 2022  Current Admission Diagnosis:Syncope   Patient Active Problem List    Diagnosis Date Noted    Anemia 2022    Humeral fracture 2022    Lung mass 2022    Syncope 2022    Fracture of humeral shaft, right, closed 2022    GIULIANA (acute kidney injury) ([facility] ) 2020    History of DVT (deep vein thrombosis) 2020    Type 2 diabetes mellitus with stage 2 chronic kidney disease and hypertension ([facility] ) 2020    Stroke-like symptoms 2020    Pulmonary nodule 2020    History of breast cancer 2020    Hypertensive urgency 2020    Hypoxia 2019    Bronchospasm     Acute respiratory failure with hypoxia ([facility] ) 2019    DVT (deep venous thrombosis) ([place] [address] ) 2019    Tobacco abuse 2019    Stage 3 chronic kidney disease ([facility] ) 2019    Type 2 diabetes mellitus without complication, without long-term current use of insulin ([facility] ) 2019      LOS (days): 6  Geometric Mean LOS (GMLOS) (days): 2 30  Days to GMLOS:-3 9     OBJECTIVE:  Risk of Unplanned Readmission Score: 19 79         Current admission status: Inpatient   Preferred Pharmacy:   [id number] [address]  Phone: [phone] Fax: [phone]    [id number],  [place] [address]  Phone: [phone] Fax: [phone]    Primary Care Provider: Jesus Sun DO    Primary Insurance: Mitchell Christianson [facility] REP  Secondary Insurance:     DISCHARGE DETAILS:    Discharge planning discussed with[de-identified] Alfonzo Sapp (daughter) was called at  16:29pm  Freedom of Choice: Yes  Comments - Freedom of Choice: she is agreeable to hospice care  [facility]   family needs to move furniture tomorrow- equipment will be delivered Friday at 2pm  transport will be needed for 4pm on Friday  CM contacted family/caregiver?: Yes             Contacts  Patient Contacts: Jessie Thomson  Relationship to Patient[de-identified] Family (daughter)  Contact Method: Phone  Phone Number: [phone]  Reason/Outcome: Discharge [address]         Is the patient interested in [facility] at discharge?: No    DME Referral Provided  Referral made for DME?:  (hopsice will order dme equipment)    Other Referral/Resources/Interventions Provided:  Interventions: Hospice  Referral Comments: home with [facility] hospice    Would you like to participate in our [address] service program?  : No - Declined    Treatment Team Recommendation: Hospice, Home (home with daughter & [facility])

## 2022-05-18 NOTE — ASSESSMENT & PLAN NOTE
· Resolved  · Amlodipine has been initiated and appears to be effective  · Continue carvedilol as previously prescribed  · IV hydralazine and labetalol as needed  · Monitor blood pressures

## 2022-05-18 NOTE — ASSESSMENT & PLAN NOTE
· Patient had a fall at the night of 05/12/2022, with nondisplaced fracture  · Orthopedic consult - recommended conservative management  · PT and OT recommended rehab  · Due to other clinical comorbidities, the daughter is interested in home hospice  · Home hospice as above

## 2022-05-18 NOTE — ASSESSMENT & PLAN NOTE
Lab Results   Component Value Date    EGFR 47 05/18/2022    EGFR 45 05/17/2022    EGFR 53 05/16/2022    CREATININE 1 11 05/18/2022    CREATININE 1 14 05/17/2022    CREATININE 1 00 05/16/2022     · Creatinine back to baseline  · Discontinue IV fluid   · Nephrology on board

## 2022-05-18 NOTE — PROGRESS NOTES
[address]  Progress Note Michael Barcenas 1945, 76 y o  female MRN: [id number]  Unit/Bed#: -01 Encounter: [id number]  Primary Care Provider: Marcelino Snider DO   Date and time admitted to hospital: 5/11/2022  1:44 PM    * Syncope  Assessment & Plan  · Due to orthostatic hypotension   · Patient daughter reports patient was recently diagnosed with small-cell lung cancer, currently seeking hospice  · CT head negative  · Chest x-ray showing lung mass  · In ED, patient's initial blood pressure was 984Q systolic, upon sitting 997C  · Troponin negative, EKG showed no acute ST changes   · PT and OT recommended rehab  · IV labetalol and IV hydralazine  · Amlodipine 5 mg PO daily started  · Continue carvedilol as previously prescribed  · Ordered compression stockings  · Patient is medically stable for rehab  · Patient's daughter would like home hospice  · Ongoing goals of care discussions being held with patient and daughter  · Hospice liaison consulted  · Case management following  · Patient will be discharged to home hospice on Friday as patient's daughter is unable to care for patient at home until all equipment has been delivered and set up    Lung mass  Assessment & Plan  · Complain of dyspnea  · Chest x-ray on admission shows left paramediastinal mass, left upper lobe mass and left pleural effusion  · Patient follows with Oncology at [facility]  · Unable to review documentation  · Per daughter, reports diagnosis of small-cell lung cancer, recently had bed port removed declining further chemotherapy  · Initially daughter said she does not want to pursue hospice until she sees the oncologist, however due to rapid decline in her clinical status, daughter would like to discuss with hospice while she is in the hospital  · Ongoing goals of care discussions  · Hospice liaison has been consulted  · Daughter interested in home hospice  · Case management following    Hypertensive urgency  Assessment & Plan  · Resolved  · Amlodipine has been initiated and appears to be effective  · Continue carvedilol as previously prescribed  · IV hydralazine and labetalol as needed  · Monitor blood pressures    Humeral fracture  Assessment & Plan  · Patient had a fall at the night of 05/12/2022, with nondisplaced fracture  · Orthopedic consult - recommended conservative management  · PT and OT recommended rehab  · Due to other clinical comorbidities, the daughter is interested in home hospice  · Home hospice as above    Anemia  Assessment & Plan  · Hemoglobin stable at 7 9  · Most likely dilutional  · Likely secondary to hematoma in the setting of humeral fracture versus anemia of chronic disease  · Follow CBC  · Monitor for signs of active bleeding    Acute respiratory failure with hypoxia (HCC)  Assessment & Plan  · Requiring 2 L nasal cannula supplemental O2 this morning  · Likely secondary to lung mass verses anxiety  · Wean O2 as tolerated  · Ongoing goals of care discussions being held with patient and family  · Respiratory protocol    DVT (deep venous thrombosis) ([facility] )  Assessment & Plan  · History of DVT, on Eliquis 2 5 b i d , will continue    Stage 3 chronic kidney disease [facility])  Assessment & Plan  Lab Results   Component Value Date    EGFR 47 05/18/2022    EGFR 45 05/17/2022    EGFR 53 05/16/2022    CREATININE 1 11 05/18/2022    CREATININE 1 14 05/17/2022    CREATININE 1 00 05/16/2022     · Creatinine back to baseline  · Discontinue IV fluid   · Nephrology on board    Type 2 diabetes mellitus without complication, without long-term current use of insulin ([facility] )  Assessment & Plan  Lab Results   Component Value Date    HGBA1C 7 6 (H) 05/12/2022     · Hold home p o   Diabetic medication  · Hb A1c 7 7, sliding scale and Accu-Cheks    History of breast cancer  Assessment & Plan  · Patient reports history of breast cancer status post resection and chemotherapy  · Currently in surveillance, follows Oncology at [facility]  · Ongoing goals of care discussions being held with family      VTE Pharmacologic Prophylaxis:  Eliquis    Patient Centered Rounds:  Patient care rounds were performed with nursing    Discussions with Specialists or Other Care Team Provider:  Case Management, Nursing, Hospice liaison    Education and Discussions with Family / Patient:  Spoke with patient's daughter    Time Spent for Care: 30  More than 50% of total time spent on counseling and coordination of care as described above  Current Length of Stay: 6 day(s)    Current Patient Status: Inpatient     Certification Statement: The patient will continue to require additional inpatient hospital stay due to discharge to home with hospice    Discharge Plan:  Home Hospice    Code Status: Level 3 - DNAR and DNI      Subjective:   Patient seen and examined at bedside  No acute events overnight  Denies chest pain, SOB, diaphoresis, nausea/vomiting/diarrhea, fevers/chills  Objective:     Vitals:   Temp (24hrs), Av 4 °F (36 9 °C), Min:97 5 °F (36 4 °C), Max:99 °F (37 2 °C)    Temp:  [97 5 °F (36 4 °C)-99 °F (37 2 °C)] 98 7 °F (37 1 °C)  HR:  [89-92] 89  Resp:  [18] 18  BP: (121-148)/(66-87) 148/87  SpO2:  [97 %-98 %] 98 %  Body mass index is 28 24 kg/m²  Input and Output Summary (last 24 hours): Intake/Output Summary (Last 24 hours) at 2022 1030  Last data filed at 2022 1700  Gross per 24 hour   Intake 120 ml   Output 350 ml   Net -230 ml       Physical Exam:     Physical Exam  Vitals and nursing note reviewed  Constitutional:       General: She is not in acute distress  Appearance: She is well-developed  She is ill-appearing  HENT:      Head: Normocephalic and atraumatic  Eyes:      Conjunctiva/sclera: Conjunctivae normal    Cardiovascular:      Rate and Rhythm: Normal rate and regular rhythm  Heart sounds: No murmur heard  Pulmonary:      Effort: Pulmonary effort is normal  No respiratory distress        Breath sounds: Normal breath sounds  Abdominal:      Palpations: Abdomen is soft  Tenderness: There is no abdominal tenderness  Musculoskeletal:      Cervical back: Neck supple  Skin:     General: Skin is warm and dry  Neurological:      Mental Status: She is alert  Additional Data:     Labs:  I have reviewed pertinent results     Results from last 7 days   Lab Units 05/18/22  0445   WBC Thousand/uL 8 43   HEMOGLOBIN g/dL 7 8*   HEMATOCRIT % 26 0*   PLATELETS Thousands/uL 292   NEUTROS PCT % 71   LYMPHS PCT % 17   MONOS PCT % 10   EOS PCT % 1     Results from last 7 days   Lab Units 05/18/22  0445 05/17/22  0511   SODIUM mmol/L 135 135   POTASSIUM mmol/L 4 6 4 4   CHLORIDE mmol/L 99 98   CO2 mmol/L 31 30   BUN mg/dL 18 20   CREATININE mg/dL 1 11 1 14   ANION GAP mmol/L 5 7   CALCIUM mg/dL 8 9 8 5   ALBUMIN g/dL  --  3 0*   TOTAL BILIRUBIN mg/dL  --  0 99   ALK PHOS U/L  --  81   ALT U/L  --  <3*   AST U/L  --  6*   GLUCOSE RANDOM mg/dL 182* 155*         Results from last 7 days   Lab Units 05/18/22  0612 05/17/22  2101 05/17/22  1500 05/17/22  1034 05/17/22  0612 05/16/22  2118 05/16/22  1524 05/16/22  1120 05/16/22  0550 05/15/22  2121 05/15/22  2020 05/15/22  1509   POC GLUCOSE mg/dl 178* 199* 261* 201* 168* 241* 133 195* 148* 207* 173* 166*     Results from last 7 days   Lab Units 05/12/22  0517   HEMOGLOBIN A1C % 7 6*             Imaging: I have reviewed pertinent imaging       Recent Cultures (last 7 days):           Last 24 Hours Medication List:   Current Facility-Administered Medications   Medication Dose Route Frequency Provider Last Rate    acetaminophen  650 mg Oral Q6H PRN Renata Schroeder MD      amLODIPine  5 mg Oral Daily Curly Dura, DO      apixaban  2 5 mg Oral BID Renata Schroeder MD      atorvastatin  80 mg Oral Daily Renata Schroeder MD      calcitriol  0 25 mcg Oral Daily Renata Schroeder MD      carvedilol  6 25 mg Oral BID With Meals Mekhi Lugo MD      cholecalciferol 5,000 Units Oral Once per day on Mon Wed Fri Yury Stewart MD      famotidine  20 mg Oral BID Yury Stewart MD      hydrALAZINE  5 mg Intravenous Q6H PRN Frankie Marie,       insulin lispro  1-5 Units Subcutaneous TID [facility] Yury Stewart MD      labetalol  10 mg Intravenous Q6H PRN Leena Zhao MD      LORazepam  0 5 mg Intravenous Q4H PRN Frankie Marie,       ondansetron  4 mg Intravenous Q6H PRN Yury Stewart MD      PARoxetine  40 mg Oral Daily Leena Zhao MD          Today, Patient Was Seen By: Frankie Marie DO    ** Please Note: Dictation voice to text software may have been used in the creation of this document   **

## 2022-05-18 NOTE — ASSESSMENT & PLAN NOTE
· Hemoglobin stable at 7 9  · Most likely dilutional  · Likely secondary to hematoma in the setting of humeral fracture versus anemia of chronic disease  · Follow CBC  · Monitor for signs of active bleeding

## 2022-05-18 NOTE — ASSESSMENT & PLAN NOTE
· Patient reports history of breast cancer status post resection and chemotherapy  · Currently in surveillance, follows Oncology at [facility]  · Ongoing goals of care discussions being held with family

## 2022-05-19 NOTE — ASSESSMENT & PLAN NOTE
· Requiring 2 L nasal cannula supplemental O2 this morning  · Likely secondary to lung mass verses anxiety  · Wean O2 as tolerated  · Ongoing goals of care discussions being held with patient and family  · Respiratory protocol  · No signs of pulmonary infection

## 2022-05-19 NOTE — ASSESSMENT & PLAN NOTE
· Due to orthostatic hypotension   · Patient daughter reports patient was recently diagnosed with small-cell lung cancer, currently seeking hospice  · CT head negative  · Chest x-ray showing lung mass  · In ED, patient's initial blood pressure was 970P systolic, upon sitting 564Q  · Troponin negative, EKG showed no acute ST changes   · PT and OT recommended rehab  · IV labetalol and IV hydralazine  · Amlodipine 5 mg PO daily started  · Continue carvedilol as previously prescribed  · Ordered compression stockings  · Patient is medically stable for rehab  · Patient's daughter would like home hospice  · Ongoing goals of care discussions being held with patient and daughter  · Hospice liaison consulted  · Case management following  · Patient will be discharged to home hospice on Friday as patient's daughter is unable to care for patient at home until all equipment has been delivered and set up

## 2022-05-19 NOTE — ASSESSMENT & PLAN NOTE
· Hemoglobin stable   · Likely secondary to hematoma in the setting of humeral fracture versus anemia of chronic disease  · Follow CBC  · Monitor for signs of active bleeding

## 2022-05-19 NOTE — ASSESSMENT & PLAN NOTE
· Patient reports history of breast cancer status post resection and chemotherapy  · Currently in surveillance, follows Oncology at [facility]  · Ongoing goals of care discussions being held with family

## 2022-05-19 NOTE — CASE MANAGEMENT
Case Management Progress Note    Patient name Kimberly Tovar  Location /-01 MRN [id number]  : 1945 Date 2022       LOS (days): 7  Geometric Mean LOS (GMLOS) (days): 2 30  Days to GMLOS:-5        OBJECTIVE:        Current admission status: Inpatient  Preferred Pharmacy:   [facility]/pharmacy #[id number]- [address] [address]  Phone: [phone] Fax: [phone]    [id number],  [facility]  [address] [place] [address]  Phone: [phone] Fax: [phone]    Primary Care Provider: Marielle Mckoy DO    Primary Insurance: [facility]  Secondary Insurance:     PROGRESS NOTE:  Josafat Cotton received for transportation [id number]

## 2022-05-19 NOTE — PROGRESS NOTES
[address]  Progress Note Kei Allen 1945, 76 y o  female MRN: [id number]  Unit/Bed#: -01 Encounter: [id number]  Primary Care Provider: Tiffanie Mcclelland DO   Date and time admitted to hospital: 5/11/2022  1:44 PM    * Syncope  Assessment & Plan  · Due to orthostatic hypotension   · Patient daughter reports patient was recently diagnosed with small-cell lung cancer, currently seeking hospice  · CT head negative  · Chest x-ray showing lung mass  · In ED, patient's initial blood pressure was 078A systolic, upon sitting 546M  · Troponin negative, EKG showed no acute ST changes   · PT and OT recommended rehab  · IV labetalol and IV hydralazine  · Amlodipine 5 mg PO daily started  · Continue carvedilol as previously prescribed  · Ordered compression stockings  · Patient is medically stable for rehab  · Patient's daughter would like home hospice  · Ongoing goals of care discussions being held with patient and daughter  · Hospice liaison consulted  · Case management following  · Patient will be discharged to home hospice on Friday as patient's daughter is unable to care for patient at home until all equipment has been delivered and set up    Lung mass  Assessment & Plan  · Complaints of dyspnea  · Chest x-ray on admission shows left paramediastinal mass, left upper lobe mass and left pleural effusion  · Patient follows with Oncology at [facility]  · Unable to review documentation  · Per daughter, reports diagnosis of small-cell lung cancer, recently had bed port removed declining further chemotherapy  · Initially daughter said she does not want to pursue hospice until she sees the oncologist, however due to rapid decline in her clinical status, daughter would like to discuss with hospice while she is in the hospital  · Ongoing goals of care discussions  · Hospice liaison has been consulted  · Daughter interested in home hospice  · Case management following    Hypertensive urgency  Assessment & Plan  · Resolved  · Amlodipine has been initiated and appears to be effective  · Continue carvedilol as previously prescribed  · IV hydralazine and labetalol as needed  · Monitor blood pressures    Humeral fracture  Assessment & Plan  · Patient had a fall at the night of 05/12/2022, with nondisplaced fracture  · Orthopedic consult - recommended conservative management  · PT and OT recommended rehab  · Due to other clinical comorbidities, the daughter is interested in home hospice  · Home hospice as above    Anemia  Assessment & Plan  · Hemoglobin stable   · Likely secondary to hematoma in the setting of humeral fracture versus anemia of chronic disease  · Follow CBC  · Monitor for signs of active bleeding    Acute respiratory failure with hypoxia (HCC)  Assessment & Plan  · Requiring 2 L nasal cannula supplemental O2 this morning  · Likely secondary to lung mass verses anxiety  · Wean O2 as tolerated  · Ongoing goals of care discussions being held with patient and family  · Respiratory protocol  · No signs of pulmonary infection    DVT (deep venous thrombosis) ([facility] [address] )  Assessment & Plan  · History of DVT, on Eliquis 2 5 b i d , will continue    Stage 3 chronic kidney disease [facility])  Assessment & Plan  Lab Results   Component Value Date    EGFR 52 05/19/2022    EGFR 47 05/18/2022    EGFR 45 05/17/2022    CREATININE 1 02 05/19/2022    CREATININE 1 11 05/18/2022    CREATININE 1 14 05/17/2022     · Creatinine back to baseline  · Discontinue IV fluids    Type 2 diabetes mellitus without complication, without long-term current use of insulin [facility])  Assessment & Plan  Lab Results   Component Value Date    HGBA1C 7 6 (H) 05/12/2022     · Hold home p o   Diabetic medication  · Hb A1c 7 7, sliding scale and Accu-Cheks    History of breast cancer  Assessment & Plan  · Patient reports history of breast cancer status post resection and chemotherapy  · Currently in surveillance, follows Oncology at [facility]  · Ongoing goals of care discussions being held with family      VTE Pharmacologic Prophylaxis: Eliquis    Patient Centered Rounds:  Patient care rounds were performed with nursing    Discussions with Specialists or Other Care Team Provider:  Case Management, Nursing, Hospice liaison    Education and Discussions with Family / Patient:  Spoke with patient at bedside    Time Spent for Care: 30  More than 50% of total time spent on counseling and coordination of care as described above  Current Length of Stay: 7 day(s)    Current Patient Status: Inpatient     Certification Statement: The patient will continue to require additional inpatient hospital stay due to home hospice tomorrow    Discharge Plan:  Home Hospice on 2022    Code Status: Level 3 - DNAR and DNI      Subjective:   Patient seen and examined at bedside  No acute events overnight  Denies chest pain, SOB, diaphoresis, nausea/vomiting/diarrhea, fevers/chills  Objective:     Vitals:   Temp (24hrs), Av 3 °F (36 8 °C), Min:98 °F (36 7 °C), Max:98 7 °F (37 1 °C)    Temp:  [98 °F (36 7 °C)-98 7 °F (37 1 °C)] 98 7 °F (37 1 °C)  HR:  [85-88] 88  Resp:  [18-19] 19  BP: (132-178)/(76-92) 168/81  SpO2:  [95 %-97 %] 97 %  Body mass index is 28 24 kg/m²  Input and Output Summary (last 24 hours): Intake/Output Summary (Last 24 hours) at 2022 1151  Last data filed at 2022 0900  Gross per 24 hour   Intake 480 ml   Output 400 ml   Net 80 ml       Physical Exam:     Physical Exam  Vitals and nursing note reviewed  Constitutional:       General: She is not in acute distress  Appearance: She is well-developed  She is ill-appearing  HENT:      Head: Normocephalic and atraumatic  Eyes:      Conjunctiva/sclera: Conjunctivae normal    Cardiovascular:      Rate and Rhythm: Normal rate and regular rhythm  Heart sounds: No murmur heard  Pulmonary:      Effort: Pulmonary effort is normal  No respiratory distress  Breath sounds: Normal breath sounds  Abdominal:      Palpations: Abdomen is soft  Tenderness: There is no abdominal tenderness  Musculoskeletal:      Cervical back: Neck supple  Skin:     General: Skin is warm and dry  Neurological:      Mental Status: She is alert  Additional Data:     Labs: I have reviewed pertinent results     Results from last 7 days   Lab Units 05/19/22  0559   WBC Thousand/uL 7 39   HEMOGLOBIN g/dL 7 6*   HEMATOCRIT % 25 9*   PLATELETS Thousands/uL 288   NEUTROS PCT % 69   LYMPHS PCT % 18   MONOS PCT % 11   EOS PCT % 2     Results from last 7 days   Lab Units 05/19/22  0559 05/18/22  0445 05/17/22  0511   SODIUM mmol/L 137   < > 135   POTASSIUM mmol/L 4 3   < > 4 4   CHLORIDE mmol/L 99   < > 98   CO2 mmol/L 31   < > 30   BUN mg/dL 19   < > 20   CREATININE mg/dL 1 02   < > 1 14   ANION GAP mmol/L 7   < > 7   CALCIUM mg/dL 8 6   < > 8 5   ALBUMIN g/dL  --   --  3 0*   TOTAL BILIRUBIN mg/dL  --   --  0 99   ALK PHOS U/L  --   --  81   ALT U/L  --   --  <3*   AST U/L  --   --  6*   GLUCOSE RANDOM mg/dL 175*   < > 155*    < > = values in this interval not displayed           Results from last 7 days   Lab Units 05/19/22  1133 05/19/22  0555 05/18/22  2037 05/18/22  1448 05/18/22  1057 05/18/22  0612 05/17/22  2101 05/17/22  1500 05/17/22  1034 05/17/22  0612 05/16/22  2118 05/16/22  1524   POC GLUCOSE mg/dl 317* 174* 229* 170* 346* 178* 199* 261* 201* 168* 241* 133                 Imaging: I have reviewed pertinent imaging       Recent Cultures (last 7 days):           Last 24 Hours Medication List:   Current Facility-Administered Medications   Medication Dose Route Frequency Provider Last Rate    acetaminophen  650 mg Oral Q6H PRN Ishaan Lugo MD      amLODIPine  5 mg Oral Daily Skyla Ellis DO      apixaban  2 5 mg Oral BID Ishaan Lugo MD      atorvastatin  80 mg Oral Daily Ishaan Lugo MD      calcitriol  0 25 mcg Oral Daily MD Ana Paez carvedilol  6 25 mg Oral BID With Meals Romelia Farris MD      cholecalciferol  5,000 Units Oral Once per day on Mon Wed Fri Vahid Reis MD      famotidine  20 mg Oral BID Vahid Reis MD      hydrALAZINE  5 mg Intravenous Q6H PRN David Joya DO      insulin lispro  1-5 Units Subcutaneous TID [facility] Vahid Reis MD      labetalol  10 mg Intravenous Q6H PRN Yamila Richey MD      LORazepam  0 5 mg Intravenous Q4H PRN David Joya DO      ondansetron  4 mg Intravenous Q6H PRN Vahid Reis MD      PARoxetine  40 mg Oral Daily Yamila Richey MD          Today, Patient Was Seen By: David Joya DO    ** Please Note: Dictation voice to text software may have been used in the creation of this document   **

## 2022-05-19 NOTE — CASE MANAGEMENT
Case Management Discharge Planning Note    Patient name Anjana Aguilar  Location /-01 MRN [id number]  : 1945 Date 2022       Current Admission Date: 2022  Current Admission Diagnosis:Syncope   Patient Active Problem List    Diagnosis Date Noted    Anemia 2022    Humeral fracture 2022    Lung mass 2022    Syncope 2022    Fracture of humeral shaft, right, closed 2022    GI[facility]IANA [facility]acute kidney [facility]) ([email or web address] ) 2020    History of DVT (deep vein thrombosis) 2020    Type 2 diabetes mellitus with stage 2 chronic kidney disease and hypertension ([place] [address] ) 2020    Stroke-like symptoms 2020    Pulmonary nodule 2020    History of breast cancer 2020    Hypertensive urgency 2020    Hypoxia 2019    Bronchospasm     Acute respiratory failure with hypoxia ([email or web address] ) 2019    DVT (deep venous thrombosis) ([place] [address] ) 2019    Tobacco abuse 2019    [facility]    Type 2 diabetes mellitus without complication, without long-term current use of insulin ([place] [address] ) 2019      LOS (days): 7  Geometric Mean LOS (GMLOS) (days): 2 30  Days to GMLOS:-4 9     OBJECTIVE:  Risk of Unplanned Readmission Score: 20 02         Current admission status: Inpatient   Preferred Pharmacy:   [facility]/pharmacy #[id number]- [address] [address]  Phone: [phone] Fax: [phone]    [id number] [place],  [address] [address]  Phone: [phone] Fax: [phone]    Primary Care Provider: Samira Aguilera DO    Primary Insurance: [facility]  Secondary Insurance:     DISCHARGE DETAILS:    Discharge planning discussed with[de-identified] patient and daughter  Freedom of Choice: Yes  Comments - Freedom of Choice: pt has been accept tie [facility], equipment to be delivered tomorrow 2pm ad per [place] and pt's daughter stated it may come sooner, transport was set up this am for 16:00  CM contacted family/caregiver?: Yes             Contacts  Patient Contacts: Jessie Thomson  Relationship to Patient[de-identified] Family (daughter)  Contact Method: In Person  Reason/Outcome: Discharge [address]         Is the patient interested in [facility] at discharge?: No         Other Referral/Resources/Interventions Provided:  Interventions: Hospice, Transportation  Referral Comments: [facility] hospice accepted, equipment will be delivered and switched out tomorrow , oxygen is being ordered as per Keely,  16:00 bls, cm sent for auth for the transport    Would you like to participate in our [address] service program?  : No - Declined    Treatment Team Recommendation: Home (home with daughter & [facility] hopsice- 16:000 bls transport)         Keely and daughter were made aware of the d/c time, sent auth to [place]                                  Family notified[de-identified] spoke carline lai at bedside

## 2022-05-19 NOTE — ASSESSMENT & PLAN NOTE
· Complaints of dyspnea  · Chest x-ray on admission shows left paramediastinal mass, left upper lobe mass and left pleural effusion  · Patient follows with Oncology at [facility]  · Unable to review documentation  · Per daughter, reports diagnosis of small-cell lung cancer, recently had bed port removed declining further chemotherapy  · Initially daughter said she does not want to pursue hospice until she sees the oncologist, however due to rapid decline in her clinical status, daughter would like to discuss with hospice while she is in the hospital  · Ongoing goals of care discussions  · Hospice liaison has been consulted  · Daughter interested in home hospice  · Case management following

## 2022-05-20 NOTE — PLAN OF CARE
Problem: Potential for Falls  Goal: Patient will remain free of falls  Description: INTERVENTIONS:  - Educate patient/family on patient safety including physical limitations  - Instruct patient to call for assistance with activity   - Consult OT/PT to assist with strengthening/mobility   - Keep Call bell within reach  - Keep bed low and locked with side rails adjusted as appropriate  - Keep care items and personal belongings within reach  - Initiate and maintain comfort rounds  - Make Fall Risk Sign visible to staff  - Offer Toileting every 2 Hours, in advance of need  - Initiate/Maintain bed alarm  - Apply yellow socks and bracelet for high fall risk patients  - Consider moving patient to room near nurses station  Outcome: Adequate for Discharge     Problem: MOBILITY - ADULT  Goal: Maintain or return to baseline ADL function  Description: INTERVENTIONS:  -  Assess patient's ability to carry out ADLs; assess patient's baseline for ADL function and identify physical deficits which impact ability to perform ADLs (bathing, care of mouth/teeth, toileting, grooming, dressing, etc )  - Assess/evaluate cause of self-care deficits   - Assess range of motion  - Assess patient's mobility; develop plan if impaired  - Assess patient's need for assistive devices and provide as appropriate  - Encourage maximum independence but intervene and supervise when necessary  - Involve family in performance of ADLs  - Assess for home care needs following discharge   - Consider OT consult to assist with ADL evaluation and planning for discharge  - Provide patient education as appropriate  Outcome: Adequate for Discharge  Goal: Maintains/Returns to pre admission functional level  Description: INTERVENTIONS:  - Perform BMAT or MOVE assessment daily    - Set and communicate daily mobility goal to care team and patient/family/caregiver     - Collaborate with rehabilitation services on mobility goals if consulted  - Out of bed for toileting  - Record patient progress and toleration of activity level   Outcome: Adequate for Discharge     Problem: PAIN - ADULT  Goal: Verbalizes/displays adequate comfort level or baseline comfort level  Description: Interventions:  - Encourage patient to monitor pain and request assistance  - Assess pain using appropriate pain scale  - Administer analgesics based on type and severity of pain and evaluate response  - Implement non-pharmacological measures as appropriate and evaluate response  - Consider cultural and social influences on pain and pain management  - Notify physician/advanced practitioner if interventions unsuccessful or patient reports new pain  Outcome: Adequate for Discharge     Problem: INFECTION - ADULT  Goal: Absence or prevention of progression during hospitalization  Description: INTERVENTIONS:  - Assess and monitor for signs and symptoms of infection  - Monitor lab/diagnostic results  - Monitor all insertion sites, i e  indwelling lines, tubes, and drains  - Monitor endotracheal if appropriate and nasal secretions for changes in amount and color  - Newton appropriate cooling/warming therapies per order  - Administer medications as ordered  - Instruct and encourage patient and family to use good hand hygiene technique  - Identify and instruct in appropriate isolation precautions for identified infection/condition  Outcome: Adequate for Discharge     Problem: SAFETY ADULT  Goal: Patient will remain free of falls  Description: INTERVENTIONS:  - Educate patient/family on patient safety including physical limitations  - Instruct patient to call for assistance with activity   - Consult OT/PT to assist with strengthening/mobility   - Keep Call bell within reach  - Keep bed low and locked with side rails adjusted as appropriate  - Keep care items and personal belongings within reach  - Initiate and maintain comfort rounds  - Make Fall Risk Sign visible to staff  - Offer Toileting every 2 Hours, in advance of need  - Initiate/Maintain bed alarm  - Apply yellow socks and bracelet for high fall risk patients  - Consider moving patient to room near nurses station  Outcome: Adequate for Discharge  Goal: Maintain or return to baseline ADL function  Description: INTERVENTIONS:  -  Assess patient's ability to carry out ADLs; assess patient's baseline for ADL function and identify physical deficits which impact ability to perform ADLs (bathing, care of mouth/teeth, toileting, grooming, dressing, etc )  - Assess/evaluate cause of self-care deficits   - Assess range of motion  - Assess patient's mobility; develop plan if impaired  - Assess patient's need for assistive devices and provide as appropriate  - Encourage maximum independence but intervene and supervise when necessary  - Involve family in performance of ADLs  - Assess for home care needs following discharge   - Consider OT consult to assist with ADL evaluation and planning for discharge  - Provide patient education as appropriate  Outcome: Adequate for Discharge  Goal: Maintains/Returns to pre admission functional level  Description: INTERVENTIONS:  - Perform BMAT or MOVE assessment daily    - Set and communicate daily mobility goal to care team and patient/family/caregiver     - Collaborate with rehabilitation services on mobility goals if consulted  - Out of bed for toileting  - Record patient progress and toleration of activity level   Outcome: Adequate for Discharge     Problem: DISCHARGE PLANNING  Goal: Discharge to home or other facility with appropriate resources  Description: INTERVENTIONS:  - Identify barriers to discharge w/patient and caregiver  - Arrange for needed discharge resources and transportation as appropriate  - Identify discharge learning needs (meds, wound care, etc )  - Arrange for interpretive services to assist at discharge as needed  - Refer to Case Management Department for coordinating discharge planning if the patient needs post-hospital services based on physician/advanced practitioner order or complex needs related to functional status, cognitive ability, or social support system  Outcome: Adequate for Discharge     Problem: Knowledge Deficit  Goal: Patient/family/caregiver demonstrates understanding of disease process, treatment plan, medications, and discharge instructions  Description: Complete learning assessment and assess knowledge base  Interventions:  - Provide teaching at level of understanding  - Provide teaching via preferred learning methods  Outcome: Adequate for Discharge     Problem: Prexisting or High Potential for Compromised Skin Integrity  Goal: Skin integrity is maintained or improved  Description: INTERVENTIONS:  - Identify patients at risk for skin breakdown  - Assess and monitor skin integrity  - Assess and monitor nutrition and hydration status  - Monitor labs   - Assess for incontinence   - Turn and reposition patient  - Assist with mobility/ambulation  - Relieve pressure over bony prominences  - Avoid friction and shearing  - Provide appropriate hygiene as needed including keeping skin clean and dry  - Evaluate need for skin moisturizer/barrier cream  - Collaborate with interdisciplinary team   - Patient/family teaching  - Consider wound care consult   Outcome: Adequate for Discharge     Problem: Nutrition/Hydration-ADULT  Goal: Nutrient/Hydration intake appropriate for improving, restoring or maintaining nutritional needs  Description: Monitor and assess patient's nutrition/hydration status for malnutrition  Collaborate with interdisciplinary team and initiate plan and interventions as ordered  Monitor patient's weight and dietary intake as ordered or per policy  Utilize nutrition screening tool and intervene as necessary  Determine patient's food preferences and provide high-protein, high-caloric foods as appropriate       INTERVENTIONS:  - Monitor oral intake, urinary output, labs, and treatment plans  - Assess nutrition and hydration status and recommend course of action  - Evaluate amount of meals eaten  - Assist patient with eating if necessary   - Allow adequate time for meals  - Recommend/ encourage appropriate diets, oral nutritional supplements, and vitamin/mineral supplements  - Order, calculate, and assess calorie counts as needed  - Recommend, monitor, and adjust tube feedings and TPN/PPN based on assessed needs  - Assess need for intravenous fluids  - Provide specific nutrition/hydration education as appropriate  - Include patient/family/caregiver in decisions related to nutrition  Outcome: Adequate for Discharge

## 2022-05-20 NOTE — ASSESSMENT & PLAN NOTE
Lab Results   Component Value Date    EGFR 52 05/19/2022    EGFR 47 05/18/2022    EGFR 45 05/17/2022    CREATININE 1 02 05/19/2022    CREATININE 1 11 05/18/2022    CREATININE 1 14 05/17/2022     · Creatinine back to baseline  · Discontinue IV fluids

## 2022-05-20 NOTE — ASSESSMENT & PLAN NOTE
· Due to orthostatic hypotension   · Patient daughter reports patient was recently diagnosed with small-cell lung cancer, currently seeking hospice  · CT head negative  · Chest x-ray showing lung mass  · In ED, patient's initial blood pressure was 143U systolic, upon sitting 624K  · Troponin negative, EKG showed no acute ST changes   · PT and OT recommended rehab  · IV labetalol and IV hydralazine  · Amlodipine 5 mg PO daily started  · Continue carvedilol as previously prescribed  · Ordered compression stockings  · Patient is medically stable for rehab  · Patient's daughter would like home hospice  · Ongoing goals of care discussions being held with patient and daughter  · Hospice liaison consulted  · Case management following  · Patient will be discharged to home hospice on Friday as patient's daughter is unable to care for patient at home until all equipment has been delivered and set up

## 2022-05-20 NOTE — DISCHARGE SUMMARY
[address]  Discharge- Pierre Britt 1945, 76 y o  female MRN: [id number]  Unit/Bed#: -01 Encounter: [id number]  Primary Care Provider: Nikolay Rivera DO   Date and time admitted to hospital: 5/11/2022  1:44 PM    * Syncope  Assessment & Plan  · Due to orthostatic hypotension   · Patient daughter reports patient was recently diagnosed with small-cell lung cancer, currently seeking hospice  · CT head negative  · Chest x-ray showing lung mass  · In ED, patient's initial blood pressure was 798Y systolic, upon sitting 096K  · Troponin negative, EKG showed no acute ST changes   · PT and OT recommended rehab  · IV labetalol and IV hydralazine  · Amlodipine 5 mg PO daily started  · Continue carvedilol as previously prescribed  · Ordered compression stockings  · Patient is medically stable for rehab  · Patient's daughter would like home hospice  · Ongoing goals of care discussions being held with patient and daughter  · Hospice liaison consulted  · Case management following  · Patient will be discharged to home hospice on Friday as patient's daughter is unable to care for patient at home until all equipment has been delivered and set up    Lung mass  Assessment & Plan  · Complaints of dyspnea  · Chest x-ray on admission shows left paramediastinal mass, left upper lobe mass and left pleural effusion  · Patient follows with Oncology at [address] [place] [address]  · Unable to review documentation  · Per daughter, reports diagnosis of small-cell lung cancer, recently had bed port removed declining further chemotherapy  · Initially daughter said she does not want to pursue hospice until she sees the oncologist, however due to rapid decline in her clinical status, daughter would like to discuss with hospice while she is in the hospital  · Ongoing goals of care discussions  · Hospice liaison has been consulted  · Daughter interested in home hospice  · Case management following    Hypertensive urgency  Assessment & Plan  · Resolved  · Amlodipine has been initiated and appears to be effective  · Continue carvedilol as previously prescribed  · IV hydralazine and labetalol as needed  · Monitor blood pressures    Humeral fracture  Assessment & Plan  · Patient had a fall at the night of 05/12/2022, with nondisplaced fracture  · Orthopedic consult - recommended conservative management  · PT and OT recommended rehab  · Due to other clinical comorbidities, the daughter is interested in home hospice  · Home hospice as above    Anemia  Assessment & Plan  · Hemoglobin stable   · Likely secondary to hematoma in the setting of humeral fracture versus anemia of chronic disease  · Follow CBC  · Monitor for signs of active bleeding    Acute respiratory failure with hypoxia (HCC)  Assessment & Plan  · Requiring 2 L nasal cannula supplemental O2 this morning  · Likely secondary to lung mass verses anxiety  · Wean O2 as tolerated  · Ongoing goals of care discussions being held with patient and family  · Respiratory protocol  · No signs of pulmonary infection    DVT (deep venous thrombosis) ([facility] [address] )  Assessment & Plan  · History of DVT, on Eliquis 2 5 b i d , will continue    Stage 3 chronic kidney disease [facility])  Assessment & Plan  Lab Results   Component Value Date    EGFR 52 05/19/2022    EGFR 47 05/18/2022    EGFR 45 05/17/2022    CREATININE 1 02 05/19/2022    CREATININE 1 11 05/18/2022    CREATININE 1 14 05/17/2022     · Creatinine back to baseline  · Discontinue IV fluids    Type 2 diabetes mellitus without complication, without long-term current use of insulin [facility])  Assessment & Plan  Lab Results   Component Value Date    HGBA1C 7 6 (H) 05/12/2022     · Hold home p o   Diabetic medication  · Hb A1c 7 7, sliding scale and Accu-Cheks    History of breast cancer  Assessment & Plan  · Patient reports history of breast cancer status post resection and chemotherapy  · Currently in surveillance, follows Oncology at [facility]  · Ongoing goals of care discussions being held with family      Discharging Physician / Practitioner: David Storm DO  PCP: Nikolay Rivera DO  Admission Date:   Admission Orders (From admission, onward)     Ordered        05/12/22 1305  Inpatient Admission  Once            05/12/22 1223  Place in Observation  Once,   Status:  Canceled            05/12/22 1051  Inpatient Admission  Once,   Status:  Canceled            05/11/22 1715  Place in Observation  Once                      Discharge Date: 05/20/22    Medical Problems             Resolved Problems  Date Reviewed: 5/20/2022   None                 Consultations During Hospital Stay:  Hospice liaison    Procedures Performed:  Not applicable    Significant Findings / Test Results:     XR chest portable    Result Date: 5/16/2022  Impression: 1  Increased opacification of the left hemithorax likely secondary to worsening layering pleural effusion with underlying atelectasis  2   Rounded density in the right midlung field  As there is an adjacent fracture of the posterior right 5th rib, this could represent callus formation  Underlying lung metastasis is not excluded and follow-up chest CT is recommended  I personally discussed this study with Marcos Zaldivar on 5/16/2022 at 8:24 AM  Workstation performed: [id number]     XR shoulder 2+ vw left    Result Date: 5/13/2022  Impression: Left humeral head and neck fracture as above  Workstation performed: [id number]     XR humerus left    Result Date: 5/13/2022  Impression: Left humeral head and neck fracture  Workstation performed: [id number]     CT head wo contrast    Result Date: 5/13/2022  Impression: No acute intracranial abnormality  Marked chronic small vessel ischemic changes  Workstation performed: [id number]     US kidney and bladder    Result Date: 5/14/2022  Impression: Small bilateral renal cysts  Otherwise, unremarkable examination  No hydronephrosis   Workstation performed: [id number]       Incidental Findings:  Not applicable    Test Results Pending at Discharge (will require follow up): Not applicable     Outpatient Tests Requested:  Not applicable    Reason for Admission:  Syncope    Hospital Course:     Nusrat Roca is a 76 y o  female who presents with dizziness reports of syncope  Patient has past medical history of breast cancer, newly diagnosed lung cancer primary, hypertension, diabetes and DVT  Patient is a poor historian, most history is obtained via daughter  Patient currently lives with the daughter at home  Recently was diagnosed with small cell lung cancer, follows Oncology at [facility] and opted to discontinue chemotherapy  She currently is seeking hospice for there mother and is in talks with [facility] team   Patient has been physically declining, with decreased p o  Intake  She reports that for the past several days the patient has been laying in bed had not been eating or drinking very much  Today she states that she was hungry and was trying to sit up in bed, but proceeded to fall back  After she fell back, she noted that her body was twitching and her daughter was concern for possible seizure and called EMS  She immediately awoke and reports doing well shortly afterwards  She did take her blood pressure medications this morning has been compliant  Patient currently denies any chest pains, shortness breath, palpitations, diarrhea, dysuria  The patient remained hemodynamically stable and her blood pressures have improved with initiation of carvedilol and amlodipine  The daughter states she is interested in home hospice and case management was consulted  Case management arranged for home hospice services  The patient is suffering from small cell lung cancer and will greatly benefit from home hospice  Family was brought up to par  Condition at Discharge: poor     Discharge Day Visit / Exam:     Subjective: Patient seen and examined at bedside  No acute events overnight   Denies chest pain, SOB, diaphoresis, nausea/vomiting/diarrhea, fevers/chills  Vitals: Blood Pressure: 122/79 (05/20/22 0724)  Pulse: 96 (05/20/22 0724)  Temperature: 98 5 °F (36 9 °C) (05/20/22 0724)  Temp Source: Oral (05/18/22 1540)  Respirations: 18 (05/20/22 0724)  Height: 5' 3" (160 cm) (05/17/22 1133)  Weight - Scale: 70 5 kg (155 lb 6 8 oz) (05/20/22 0600)  SpO2: 96 % (05/20/22 0724)     Exam:   Physical Exam  Vitals and nursing note reviewed  Constitutional:       General: She is not in acute distress  Appearance: She is well-developed  HENT:      Head: Normocephalic and atraumatic  Eyes:      Conjunctiva/sclera: Conjunctivae normal    Cardiovascular:      Rate and Rhythm: Normal rate and regular rhythm  Heart sounds: No murmur heard  Pulmonary:      Effort: Pulmonary effort is normal  No respiratory distress  Breath sounds: Normal breath sounds  Abdominal:      Palpations: Abdomen is soft  Tenderness: There is no abdominal tenderness  Musculoskeletal:      Cervical back: Neck supple  Skin:     General: Skin is warm and dry  Neurological:      Mental Status: She is alert  Discharge instructions/Information to patient and family:   See after visit summary for information provided to patient and family  Provisions for Follow-Up Care:  See after visit summary for information related to follow-up care and any pertinent home health orders  Disposition:     Home Hospice     Discharge Statement:  I spent 60 minutes discharging the patient  This time was spent on the day of discharge  I had direct contact with the patient on the day of discharge  Greater than 50% of the total time was spent examining patient, answering all patient questions, arranging and discussing plan of care with patient as well as directly providing post-discharge instructions  Additional time then spent on discharge activities      Discharge Medications:  See after visit summary for reconciled discharge medications provided to patient and family        ** Please Note: This note has been constructed using a voice recognition system **

## 2022-05-20 NOTE — ASSESSMENT & PLAN NOTE
· Patient reports history of breast cancer status post resection and chemotherapy  · Currently in surveillance, follows Oncology at [facility]  · Ongoing goals of care discussions being held with family

## 2022-05-23 NOTE — UTILIZATION REVIEW
Notification of Discharge   This is a Notification of Discharge from our facility [address] [place] [address]  Please be advised that this patient has been discharge from our facility  Below you will find the admission and discharge date and time including the patients disposition  UTILIZATION REVIEW CONTACT:  Delos Frankel  Utilization   Network Utilization Review Department  Phone: [phone] carefully listen to the prompts  All voicemails are confidential   Email: [email or web address]     PHYSICIAN ADVISORY SERVICES:  FOR SSAM-AD-JZNI REVIEW - MEDICAL NECESSITY DENIAL  Phone: [phone]  Fax: [phone]  Email: [email or web address]     PRESENTATION DATE: 5/11/2022  1:44 PM  OBERVATION ADMISSION DATE:   INPATIENT ADMISSION DATE: 5/12/22  1:05 PM   DISCHARGE DATE: 5/20/2022  5:40 PM  DISPOSITION: Home with [address] with Hospice Care      IMPORTANT INFORMATION:  Send all requests for admission clinical reviews, approved or denied determinations and any other requests to dedicated fax number below belonging to the campus where the patient is receiving treatment   List of dedicated fax numbers:  [address] DENIALS (Administrative/Medical Necessity) [phone]   [address] (Maternity/NICU/Pediatrics) [phone]   [facility] [phone]   [address] [facility] [address] [phone]   [address] [place] [address] [phone]   [address] [phone]   [facility]  [phone]   [address] [facility]  [address] [facility] [address] [facility] [phone]

## 2022-06-17 NOTE — CASE COMMUNICATION
Ship to Pt   Home  Branch: Replaced by [facility]    Tab Briefs  Large [id number]                 96/cs (1)      Underpad, reusable [id number]  [id number] (5)

## 2022-06-26 NOTE — HOSPICE
Shannan Yeager reports feeling supported by her sister  michelle  home to provide services  pinky death cert

## 2022-06-27 ENCOUNTER — HOME CARE VISIT (OUTPATIENT)
Dept: HOME HOSPICE | Facility: HOSPICE | Age: 77
End: 2022-06-27
Payer: MEDICARE

## 2024-05-09 ENCOUNTER — DOCUMENTATION (OUTPATIENT)
Dept: HOME HEALTH SERVICES | Facility: HOME HEALTHCARE | Age: 79
End: 2024-05-09

## 2024-10-07 NOTE — ASSESSMENT & PLAN NOTE
Controlled.  Continue current hypertensive regimen.    Orders:    CBC; Future    Comprehensive metabolic panel; Future    Lipid panel; Future     · Complaints of dyspnea  · Chest x-ray on admission shows left paramediastinal mass, left upper lobe mass and left pleural effusion  · Patient follows with Oncology at [facility]  · Unable to review documentation  · Per daughter, reports diagnosis of small-cell lung cancer, recently had bed port removed declining further chemotherapy  · Initially daughter said she does not want to pursue hospice until she sees the oncologist, however due to rapid decline in her clinical status, daughter would like to discuss with hospice while she is in the hospital  · Ongoing goals of care discussions  · Hospice liaison has been consulted  · Daughter interested in home hospice  · Case management following